# Patient Record
Sex: MALE | Race: WHITE | URBAN - METROPOLITAN AREA
[De-identification: names, ages, dates, MRNs, and addresses within clinical notes are randomized per-mention and may not be internally consistent; named-entity substitution may affect disease eponyms.]

---

## 2022-08-26 ENCOUNTER — TELEPHONE (OUTPATIENT)
Dept: VASCULAR SURGERY | Facility: CLINIC | Age: 67
End: 2022-08-26

## 2022-08-26 NOTE — TELEPHONE ENCOUNTER
Called Dr Radha Colón office and spoke with the Medical [address] [place] [address] [phone], and asked that she fax over all of the patients Vascular records since we only received a BERTRAND, PT has a AAA and had surgery performed in the past to fix it  Dr Chip Duran was patients previous VS and has moved to [place] and sent Dr Laura Holley all of his records        Provided Tatianna York with Fax number [phone]

## 2023-04-11 PROBLEM — I70.203 ATHEROSCLEROSIS OF ARTERY OF BOTH LOWER EXTREMITIES (HCC): Status: ACTIVE | Noted: 2023-04-11

## 2023-04-11 PROBLEM — I10 HYPERTENSION: Status: ACTIVE | Noted: 2023-04-11

## 2023-04-11 PROBLEM — R60.0 BILATERAL LEG EDEMA: Status: ACTIVE | Noted: 2023-04-11

## 2023-04-11 PROBLEM — I42.9 CARDIOMYOPATHY (HCC): Status: ACTIVE | Noted: 2023-04-11

## 2023-04-11 PROBLEM — Z95.828 STATUS POST FEMOROFEMORAL BYPASS SURGERY: Status: ACTIVE | Noted: 2023-04-11

## 2023-06-19 ENCOUNTER — HOSPITAL ENCOUNTER (OUTPATIENT)
Dept: NON INVASIVE DIAGNOSTICS | Facility: CLINIC | Age: 68
Discharge: HOME | End: 2023-06-19
Payer: COMMERCIAL

## 2023-06-19 DIAGNOSIS — I70.203 ATHEROSCLEROSIS OF ARTERY OF BOTH LOWER EXTREMITIES (HCC): ICD-10-CM

## 2023-06-19 DIAGNOSIS — Z95.828 STATUS POST FEMOROFEMORAL BYPASS SURGERY: ICD-10-CM

## 2023-06-19 PROCEDURE — 93923 UPR/LXTR ART STDY 3+ LVLS: CPT

## 2023-06-19 PROCEDURE — 93925 LOWER EXTREMITY STUDY: CPT | Performed by: SURGERY

## 2023-06-19 PROCEDURE — 93922 UPR/L XTREMITY ART 2 LEVELS: CPT | Performed by: SURGERY

## 2023-06-19 PROCEDURE — 93925 LOWER EXTREMITY STUDY: CPT

## 2023-06-19 PROCEDURE — 93978 VASCULAR STUDY: CPT | Performed by: SURGERY

## 2023-06-19 PROCEDURE — 93978 VASCULAR STUDY: CPT

## 2023-08-29 ENCOUNTER — OFFICE VISIT (OUTPATIENT)
Dept: VASCULAR SURGERY | Facility: CLINIC | Age: 68
End: 2023-08-29
Payer: COMMERCIAL

## 2023-08-29 VITALS
BODY MASS INDEX: 26.01 KG/M2 | HEART RATE: 74 BPM | HEIGHT: 72 IN | WEIGHT: 192 LBS | DIASTOLIC BLOOD PRESSURE: 84 MMHG | SYSTOLIC BLOOD PRESSURE: 146 MMHG

## 2023-08-29 DIAGNOSIS — I42.9 CARDIOMYOPATHY, UNSPECIFIED TYPE (HCC): ICD-10-CM

## 2023-08-29 DIAGNOSIS — Z86.79 STATUS POST ENDOVASCULAR ANEURYSM REPAIR (EVAR): ICD-10-CM

## 2023-08-29 DIAGNOSIS — Z95.828 STATUS POST FEMOROFEMORAL BYPASS SURGERY: ICD-10-CM

## 2023-08-29 DIAGNOSIS — I70.203 ATHEROSCLEROSIS OF ARTERY OF BOTH LOWER EXTREMITIES (HCC): Primary | ICD-10-CM

## 2023-08-29 DIAGNOSIS — I70.613: ICD-10-CM

## 2023-08-29 DIAGNOSIS — Z98.890 STATUS POST ENDOVASCULAR ANEURYSM REPAIR (EVAR): ICD-10-CM

## 2023-08-29 PROCEDURE — 99213 OFFICE O/P EST LOW 20 MIN: CPT | Performed by: SURGERY

## 2023-08-29 NOTE — PATIENT INSTRUCTIONS
Severe pain in left foot  Pain due to short distance walking  Foot wound that is not healing    These would be danger signs of worsening arterial blockages in left leg requiring additional surgery.

## 2023-08-29 NOTE — ASSESSMENT & PLAN NOTE
former smoker, COPD, dementia, hypertension, cirrhosis, chronic sCHF, iCMP (reportedly 17%), ICD, CAD, AAA, atherosclerosis lower extremities with a complicated vascular history of prior history of ruptured aortic aneurysm about 10 years ago with endovascular aortic aneurysm repair in [place]. He also has a history of femorofemoral crossover bypass from the right to the left. This had occluded when he suffered from [facility] in 2020. Thrombectomy was tried but failed. It has been chronically occluded since the last 3 years. It is managing with his claudication symptoms with daily walks and stopping intermittently. He denies any ischemic rest pain or open wounds in the left foot. Ankle-brachial index on the left side is 0.5 versus 1 on the right. As symptoms are well managed I do not recommend any invasive intervention at this time. Moreover he is a high risk candidate due to ischemic cardiomyopathy.

## 2023-08-29 NOTE — PROGRESS NOTES
Assessment/Plan:    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)  former smoker, COPD, dementia, hypertension, cirrhosis, chronic sCHF, iCMP (reportedly 17%), ICD, CAD, AAA, atherosclerosis lower extremities with a complicated vascular history of prior history of ruptured aortic aneurysm about 10 years ago with endovascular aortic aneurysm repair in [place]. He also has a history of femorofemoral crossover bypass from the right to the left. This had occluded when he suffered [facility]. Thrombectomy was tried but failed. It has been chronically occluded since the last 3 years. It is managing with his claudication symptoms with daily walks and stopping intermittently. He denies any ischemic rest pain or open wounds in the left foot. Ankle-brachial index on the left side is 0.5 versus 1 on the right. As symptoms are well managed I do not recommend any invasive intervention at this time. Moreover he is a high risk candidate due to ischemic cardiomyopathy. Status post endovascular aneurysm repair (EVAR)  Last EVAR duplex was reviewed, there is no evidence of endoleak in the EVAR stent and the aortic size is not expanding. Thus we will continue to check once a year with aortic Doppler. Diagnoses and all orders for this visit:    Atherosclerosis of artery of both lower extremities ([address])  -     VAS evar endovascular aortic repair duplex; Future  -     VAS lower limb arterial duplex, complete bilateral; Future    Cardiomyopathy, unspecified type (HCC)  -     VAS evar endovascular aortic repair duplex; Future  -     VAS lower limb arterial duplex, complete bilateral; Future    Status post endovascular aneurysm repair (EVAR)  -     VAS evar endovascular aortic repair duplex;  Future  -     VAS lower limb arterial duplex, complete bilateral; Future    Status post femorofemoral bypass surgery    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 [facility])          Subjective:      Patient ID: Jo-Ann Christianson is a 76 y.o. male. Patient presents to review EVAR/ BERTRAND done 6/19. HPI  History obtained from patient's sister who is the caregiver. Some history also obtained from the patient. Pain in the calf left worse than right after walking a few 100 feet. He is able to carry on his day-to-day activity without issues. He will have to rest for couple of minutes after the calf pain starts and then it subsides and he can continue with his walk. Denies any rest pain or open wounds. He is managed on Eliquis, aspirin and Entresto. Patient also takes atorvastatin. The following portions of the patient's history were reviewed and updated as appropriate: allergies, current medications, past family history, past medical history, past social history, past surgical history and problem list.    Review of Systems   Constitutional: Negative. HENT: Negative. Eyes: Negative. Respiratory: Negative. Cardiovascular: Negative. Gastrointestinal: Negative. Endocrine: Negative. Genitourinary: Negative. Musculoskeletal: Negative. Skin: Negative. Allergic/Immunologic: Negative. Neurological: Negative. Hematological: Negative. Psychiatric/Behavioral: Negative. I have reviewed the review of systems as entered and made appropriate changes as necessary    Objective:      /84 (BP Location: Left arm, Patient Position: Sitting, Cuff Size: Standard)   Pulse 74   Ht 6' (1.829 m)   Wt 87.1 kg (192 lb)   BMI 26.04 kg/m²          Physical Exam  Vitals and nursing note reviewed. Constitutional:       Appearance: Normal appearance. Cardiovascular:      Rate and Rhythm: Normal rate and regular rhythm. Comments: Right DP and PT are triphasic. Left side there are biphasic  Abdominal:      General: There is no distension. Palpations: Abdomen is soft. There is no mass. Musculoskeletal:      Right lower leg: No edema.       Left lower leg: No edema. Skin:     General: Skin is warm and dry. Neurological:      Mental Status: He is alert.

## 2023-08-30 PROBLEM — Z86.79 STATUS POST ENDOVASCULAR ANEURYSM REPAIR (EVAR): Status: ACTIVE | Noted: 2023-08-30

## 2023-08-30 PROBLEM — Z98.890 STATUS POST ENDOVASCULAR ANEURYSM REPAIR (EVAR): Status: ACTIVE | Noted: 2023-08-30

## 2023-08-30 PROBLEM — I70.613: Chronic | Status: ACTIVE | Noted: 2023-04-11

## 2023-08-30 NOTE — ASSESSMENT & PLAN NOTE
Last EVAR duplex was reviewed, there is no evidence of endoleak in the EVAR stent and the aortic size is not expanding. Thus we will continue to check once a year with aortic Doppler.

## 2023-11-30 ENCOUNTER — APPOINTMENT (EMERGENCY)
Dept: CT IMAGING | Facility: HOSPITAL | Age: 68
DRG: 871 | End: 2023-11-30
Payer: COMMERCIAL

## 2023-11-30 ENCOUNTER — APPOINTMENT (EMERGENCY)
Dept: RADIOLOGY | Facility: HOSPITAL | Age: 68
DRG: 871 | End: 2023-11-30
Payer: COMMERCIAL

## 2023-11-30 ENCOUNTER — HOSPITAL ENCOUNTER (INPATIENT)
Facility: HOSPITAL | Age: 68
LOS: 4 days | Discharge: HOME HEALTH | DRG: 871 | End: 2023-12-04
Attending: EMERGENCY MEDICINE | Admitting: FAMILY MEDICINE
Payer: COMMERCIAL

## 2023-11-30 DIAGNOSIS — R65.20 SEVERE SEPSIS WITH ACUTE ORGAN DYSFUNCTION (HCC): ICD-10-CM

## 2023-11-30 DIAGNOSIS — R79.89 ELEVATED LACTIC ACID LEVEL: ICD-10-CM

## 2023-11-30 DIAGNOSIS — N17.9 ACUTE KIDNEY INJURY (HCC): ICD-10-CM

## 2023-11-30 DIAGNOSIS — R41.0 ACUTE CONFUSION: ICD-10-CM

## 2023-11-30 DIAGNOSIS — A41.9 SEVERE SEPSIS WITH ACUTE ORGAN DYSFUNCTION (HCC): ICD-10-CM

## 2023-11-30 DIAGNOSIS — I95.9 HYPOTENSION: Primary | ICD-10-CM

## 2023-11-30 DIAGNOSIS — J18.9 PNEUMONIA OF BOTH UPPER LOBES DUE TO INFECTIOUS ORGANISM: ICD-10-CM

## 2023-11-30 DIAGNOSIS — J21.0 RSV (ACUTE BRONCHIOLITIS DUE TO RESPIRATORY SYNCYTIAL VIRUS): ICD-10-CM

## 2023-11-30 PROBLEM — I50.22 CHRONIC SYSTOLIC HEART FAILURE (HCC): Chronic | Status: ACTIVE | Noted: 2023-11-30

## 2023-11-30 PROBLEM — B33.8 RSV INFECTION: Status: ACTIVE | Noted: 2023-11-30

## 2023-11-30 LAB
ALBUMIN SERPL BCP-MCNC: 4 G/DL (ref 3.5–5)
ALP SERPL-CCNC: 87 U/L (ref 34–104)
ALT SERPL W P-5'-P-CCNC: 12 U/L (ref 7–52)
AMMONIA PLAS-SCNC: 23 UMOL/L (ref 18–72)
ANION GAP SERPL CALCULATED.3IONS-SCNC: 13 MMOL/L
APTT PPP: 51 SECONDS (ref 23–37)
AST SERPL W P-5'-P-CCNC: 22 U/L (ref 13–39)
BACTERIA UR QL AUTO: ABNORMAL /HPF
BASOPHILS # BLD AUTO: 0.04 THOUSANDS/ÂΜL (ref 0–0.1)
BASOPHILS NFR BLD AUTO: 0 % (ref 0–1)
BILIRUB DIRECT SERPL-MCNC: 0.09 MG/DL (ref 0–0.2)
BILIRUB SERPL-MCNC: 0.68 MG/DL (ref 0.2–1)
BILIRUB UR QL STRIP: NEGATIVE
BUN SERPL-MCNC: 56 MG/DL (ref 5–25)
CALCIUM SERPL-MCNC: 9.1 MG/DL (ref 8.4–10.2)
CHLORIDE SERPL-SCNC: 104 MMOL/L (ref 96–108)
CLARITY UR: CLEAR
CO2 SERPL-SCNC: 19 MMOL/L (ref 21–32)
COLOR UR: YELLOW
CREAT SERPL-MCNC: 4.18 MG/DL (ref 0.6–1.3)
EOSINOPHIL # BLD AUTO: 0.09 THOUSAND/ÂΜL (ref 0–0.61)
EOSINOPHIL NFR BLD AUTO: 0 % (ref 0–6)
ERYTHROCYTE [DISTWIDTH] IN BLOOD BY AUTOMATED COUNT: 18.6 % (ref 11.6–15.1)
ETHANOL SERPL-MCNC: <10 MG/DL
FLUAV RNA RESP QL NAA+PROBE: NEGATIVE
FLUBV RNA RESP QL NAA+PROBE: NEGATIVE
GFR SERPL CREATININE-BSD FRML MDRD: 13 ML/MIN/1.73SQ M
GLUCOSE SERPL-MCNC: 108 MG/DL (ref 65–140)
GLUCOSE UR STRIP-MCNC: NEGATIVE MG/DL
HCT VFR BLD AUTO: 36.2 % (ref 36.5–49.3)
HGB BLD-MCNC: 11.1 G/DL (ref 12–17)
HGB UR QL STRIP.AUTO: ABNORMAL
HYALINE CASTS #/AREA URNS LPF: ABNORMAL /LPF
IMM GRANULOCYTES # BLD AUTO: 0.24 THOUSAND/UL (ref 0–0.2)
IMM GRANULOCYTES NFR BLD AUTO: 1 % (ref 0–2)
INR PPP: 2.09 (ref 0.84–1.19)
KETONES UR STRIP-MCNC: NEGATIVE MG/DL
LACTATE SERPL-SCNC: 1.5 MMOL/L (ref 0.5–2)
LACTATE SERPL-SCNC: 2.8 MMOL/L (ref 0.5–2)
LEUKOCYTE ESTERASE UR QL STRIP: NEGATIVE
LYMPHOCYTES # BLD AUTO: 1.09 THOUSANDS/ÂΜL (ref 0.6–4.47)
LYMPHOCYTES NFR BLD AUTO: 5 % (ref 14–44)
MCH RBC QN AUTO: 25.2 PG (ref 26.8–34.3)
MCHC RBC AUTO-ENTMCNC: 30.7 G/DL (ref 31.4–37.4)
MCV RBC AUTO: 82 FL (ref 82–98)
MONOCYTES # BLD AUTO: 1.71 THOUSAND/ÂΜL (ref 0.17–1.22)
MONOCYTES NFR BLD AUTO: 8 % (ref 4–12)
MUCOUS THREADS UR QL AUTO: ABNORMAL
NEUTROPHILS # BLD AUTO: 18.68 THOUSANDS/ÂΜL (ref 1.85–7.62)
NEUTS SEG NFR BLD AUTO: 86 % (ref 43–75)
NITRITE UR QL STRIP: NEGATIVE
NON-SQ EPI CELLS URNS QL MICRO: ABNORMAL /HPF
NRBC BLD AUTO-RTO: 0 /100 WBCS
PH UR STRIP.AUTO: 5.5 [PH]
PLATELET # BLD AUTO: 130 THOUSANDS/UL (ref 149–390)
PMV BLD AUTO: 12.2 FL (ref 8.9–12.7)
POTASSIUM SERPL-SCNC: 5 MMOL/L (ref 3.5–5.3)
PROCALCITONIN SERPL-MCNC: 27.48 NG/ML
PROT SERPL-MCNC: 7.2 G/DL (ref 6.4–8.4)
PROT UR STRIP-MCNC: ABNORMAL MG/DL
PROTHROMBIN TIME: 24.3 SECONDS (ref 11.6–14.5)
RBC # BLD AUTO: 4.41 MILLION/UL (ref 3.88–5.62)
RBC #/AREA URNS AUTO: ABNORMAL /HPF
RSV RNA RESP QL NAA+PROBE: POSITIVE
SARS-COV-2 RNA RESP QL NAA+PROBE: NEGATIVE
SODIUM SERPL-SCNC: 136 MMOL/L (ref 135–147)
SP GR UR STRIP.AUTO: 1.02 (ref 1–1.03)
TSH SERPL DL<=0.05 MIU/L-ACNC: 6.49 UIU/ML (ref 0.45–4.5)
UROBILINOGEN UR QL STRIP.AUTO: 0.2 E.U./DL
WBC # BLD AUTO: 21.85 THOUSAND/UL (ref 4.31–10.16)
WBC #/AREA URNS AUTO: ABNORMAL /HPF

## 2023-11-30 PROCEDURE — 83605 ASSAY OF LACTIC ACID: CPT | Performed by: EMERGENCY MEDICINE

## 2023-11-30 PROCEDURE — 74176 CT ABD & PELVIS W/O CONTRAST: CPT

## 2023-11-30 PROCEDURE — 82077 ASSAY SPEC XCP UR&BREATH IA: CPT | Performed by: EMERGENCY MEDICINE

## 2023-11-30 PROCEDURE — 85730 THROMBOPLASTIN TIME PARTIAL: CPT | Performed by: EMERGENCY MEDICINE

## 2023-11-30 PROCEDURE — 80048 BASIC METABOLIC PNL TOTAL CA: CPT | Performed by: EMERGENCY MEDICINE

## 2023-11-30 PROCEDURE — 84145 PROCALCITONIN (PCT): CPT

## 2023-11-30 PROCEDURE — 82140 ASSAY OF AMMONIA: CPT | Performed by: EMERGENCY MEDICINE

## 2023-11-30 PROCEDURE — 96360 HYDRATION IV INFUSION INIT: CPT

## 2023-11-30 PROCEDURE — 87040 BLOOD CULTURE FOR BACTERIA: CPT | Performed by: EMERGENCY MEDICINE

## 2023-11-30 PROCEDURE — 99285 EMERGENCY DEPT VISIT HI MDM: CPT

## 2023-11-30 PROCEDURE — 93005 ELECTROCARDIOGRAM TRACING: CPT

## 2023-11-30 PROCEDURE — 0241U HB NFCT DS VIR RESP RNA 4 TRGT: CPT | Performed by: EMERGENCY MEDICINE

## 2023-11-30 PROCEDURE — 84443 ASSAY THYROID STIM HORMONE: CPT | Performed by: INTERNAL MEDICINE

## 2023-11-30 PROCEDURE — 80076 HEPATIC FUNCTION PANEL: CPT | Performed by: EMERGENCY MEDICINE

## 2023-11-30 PROCEDURE — 71045 X-RAY EXAM CHEST 1 VIEW: CPT

## 2023-11-30 PROCEDURE — 99223 1ST HOSP IP/OBS HIGH 75: CPT | Performed by: FAMILY MEDICINE

## 2023-11-30 PROCEDURE — 36415 COLL VENOUS BLD VENIPUNCTURE: CPT | Performed by: EMERGENCY MEDICINE

## 2023-11-30 PROCEDURE — 85025 COMPLETE CBC W/AUTO DIFF WBC: CPT | Performed by: EMERGENCY MEDICINE

## 2023-11-30 PROCEDURE — 70450 CT HEAD/BRAIN W/O DYE: CPT

## 2023-11-30 PROCEDURE — 85610 PROTHROMBIN TIME: CPT | Performed by: EMERGENCY MEDICINE

## 2023-11-30 PROCEDURE — 87493 C DIFF AMPLIFIED PROBE: CPT | Performed by: INTERNAL MEDICINE

## 2023-11-30 PROCEDURE — 81001 URINALYSIS AUTO W/SCOPE: CPT | Performed by: EMERGENCY MEDICINE

## 2023-11-30 PROCEDURE — 71250 CT THORAX DX C-: CPT

## 2023-11-30 PROCEDURE — 99285 EMERGENCY DEPT VISIT HI MDM: CPT | Performed by: EMERGENCY MEDICINE

## 2023-11-30 RX ORDER — ATORVASTATIN CALCIUM 40 MG/1
80 TABLET, FILM COATED ORAL
Status: DISCONTINUED | OUTPATIENT
Start: 2023-12-01 | End: 2023-12-04 | Stop reason: HOSPADM

## 2023-11-30 RX ORDER — CEFEPIME HYDROCHLORIDE 1 G/50ML
1000 INJECTION, SOLUTION INTRAVENOUS EVERY 12 HOURS
Status: DISCONTINUED | OUTPATIENT
Start: 2023-11-30 | End: 2023-12-02

## 2023-11-30 RX ORDER — TEMAZEPAM 15 MG/1
30 CAPSULE ORAL
Status: DISCONTINUED | OUTPATIENT
Start: 2023-11-30 | End: 2023-12-04 | Stop reason: HOSPADM

## 2023-11-30 RX ORDER — SODIUM CHLORIDE, SODIUM GLUCONATE, SODIUM ACETATE, POTASSIUM CHLORIDE, MAGNESIUM CHLORIDE, SODIUM PHOSPHATE, DIBASIC, AND POTASSIUM PHOSPHATE .53; .5; .37; .037; .03; .012; .00082 G/100ML; G/100ML; G/100ML; G/100ML; G/100ML; G/100ML; G/100ML
75 INJECTION, SOLUTION INTRAVENOUS CONTINUOUS
Status: DISCONTINUED | OUTPATIENT
Start: 2023-11-30 | End: 2023-11-30

## 2023-11-30 RX ORDER — CARVEDILOL 12.5 MG/1
25 TABLET ORAL 2 TIMES DAILY WITH MEALS
Status: DISCONTINUED | OUTPATIENT
Start: 2023-12-01 | End: 2023-12-04 | Stop reason: HOSPADM

## 2023-11-30 RX ORDER — ONDANSETRON 2 MG/ML
4 INJECTION INTRAMUSCULAR; INTRAVENOUS EVERY 6 HOURS PRN
Status: DISCONTINUED | OUTPATIENT
Start: 2023-11-30 | End: 2023-12-04 | Stop reason: HOSPADM

## 2023-11-30 RX ORDER — MIRTAZAPINE 15 MG/1
30 TABLET, FILM COATED ORAL
Status: DISCONTINUED | OUTPATIENT
Start: 2023-11-30 | End: 2023-12-04 | Stop reason: HOSPADM

## 2023-11-30 RX ORDER — CEFTRIAXONE 2 G/50ML
2000 INJECTION, SOLUTION INTRAVENOUS EVERY 24 HOURS
Status: DISCONTINUED | OUTPATIENT
Start: 2023-12-01 | End: 2023-11-30

## 2023-11-30 RX ORDER — ATORVASTATIN CALCIUM 80 MG/1
TABLET, FILM COATED ORAL
Status: CANCELLED | OUTPATIENT
Start: 2023-11-30

## 2023-11-30 RX ORDER — ACETAMINOPHEN 325 MG/1
650 TABLET ORAL EVERY 6 HOURS PRN
Status: DISCONTINUED | OUTPATIENT
Start: 2023-11-30 | End: 2023-12-04 | Stop reason: HOSPADM

## 2023-11-30 RX ORDER — DOCUSATE SODIUM 100 MG/1
100 CAPSULE, LIQUID FILLED ORAL 2 TIMES DAILY
Status: DISCONTINUED | OUTPATIENT
Start: 2023-11-30 | End: 2023-12-04 | Stop reason: HOSPADM

## 2023-11-30 RX ADMIN — CEFEPIME HYDROCHLORIDE 1000 MG: 1 INJECTION, SOLUTION INTRAVENOUS at 22:04

## 2023-11-30 RX ADMIN — MIRTAZAPINE 30 MG: 15 TABLET, FILM COATED ORAL at 21:56

## 2023-11-30 RX ADMIN — TEMAZEPAM 30 MG: 15 CAPSULE ORAL at 23:08

## 2023-11-30 RX ADMIN — SODIUM CHLORIDE 1000 ML: 0.9 INJECTION, SOLUTION INTRAVENOUS at 19:50

## 2023-11-30 RX ADMIN — VANCOMYCIN HYDROCHLORIDE 2000 MG: 1 INJECTION, POWDER, LYOPHILIZED, FOR SOLUTION INTRAVENOUS at 23:06

## 2023-11-30 RX ADMIN — APIXABAN 5 MG: 5 TABLET, FILM COATED ORAL at 20:29

## 2023-11-30 RX ADMIN — SODIUM CHLORIDE 1000 ML: 0.9 INJECTION, SOLUTION INTRAVENOUS at 17:56

## 2023-11-30 RX ADMIN — SODIUM CHLORIDE 1000 ML: 0.9 INJECTION, SOLUTION INTRAVENOUS at 18:29

## 2023-11-30 RX ADMIN — PIPERACILLIN AND TAZOBACTAM 3.38 G: 36; 4.5 INJECTION, POWDER, FOR SOLUTION INTRAVENOUS at 19:32

## 2023-12-01 PROBLEM — I73.9 PAD (PERIPHERAL ARTERY DISEASE) (HCC): Status: ACTIVE | Noted: 2023-12-01

## 2023-12-01 PROBLEM — G93.41 METABOLIC ENCEPHALOPATHY: Status: ACTIVE | Noted: 2023-12-01

## 2023-12-01 LAB
ANION GAP SERPL CALCULATED.3IONS-SCNC: 9 MMOL/L
ATRIAL RATE: 78 BPM
BASOPHILS # BLD AUTO: 0.03 THOUSANDS/ÂΜL (ref 0–0.1)
BASOPHILS NFR BLD AUTO: 0 % (ref 0–1)
BUN SERPL-MCNC: 42 MG/DL (ref 5–25)
C DIFF TOX GENS STL QL NAA+PROBE: NEGATIVE
CALCIUM SERPL-MCNC: 8.1 MG/DL (ref 8.4–10.2)
CHLORIDE SERPL-SCNC: 109 MMOL/L (ref 96–108)
CO2 SERPL-SCNC: 19 MMOL/L (ref 21–32)
CREAT SERPL-MCNC: 2.61 MG/DL (ref 0.6–1.3)
EOSINOPHIL # BLD AUTO: 0 THOUSAND/ÂΜL (ref 0–0.61)
EOSINOPHIL NFR BLD AUTO: 0 % (ref 0–6)
ERYTHROCYTE [DISTWIDTH] IN BLOOD BY AUTOMATED COUNT: 18.5 % (ref 11.6–15.1)
GFR SERPL CREATININE-BSD FRML MDRD: 24 ML/MIN/1.73SQ M
GLUCOSE SERPL-MCNC: 83 MG/DL (ref 65–140)
HCT VFR BLD AUTO: 35 % (ref 36.5–49.3)
HGB BLD-MCNC: 10.7 G/DL (ref 12–17)
IMM GRANULOCYTES # BLD AUTO: 0.1 THOUSAND/UL (ref 0–0.2)
IMM GRANULOCYTES NFR BLD AUTO: 1 % (ref 0–2)
LYMPHOCYTES # BLD AUTO: 0.79 THOUSANDS/ÂΜL (ref 0.6–4.47)
LYMPHOCYTES NFR BLD AUTO: 6 % (ref 14–44)
MAGNESIUM SERPL-MCNC: 1.5 MG/DL (ref 1.9–2.7)
MCH RBC QN AUTO: 25.1 PG (ref 26.8–34.3)
MCHC RBC AUTO-ENTMCNC: 30.6 G/DL (ref 31.4–37.4)
MCV RBC AUTO: 82 FL (ref 82–98)
MONOCYTES # BLD AUTO: 1.05 THOUSAND/ÂΜL (ref 0.17–1.22)
MONOCYTES NFR BLD AUTO: 8 % (ref 4–12)
NEUTROPHILS # BLD AUTO: 11.71 THOUSANDS/ÂΜL (ref 1.85–7.62)
NEUTS SEG NFR BLD AUTO: 85 % (ref 43–75)
NRBC BLD AUTO-RTO: 0 /100 WBCS
P AXIS: 48 DEGREES
PHOSPHATE SERPL-MCNC: 2.8 MG/DL (ref 2.3–4.1)
PLATELET # BLD AUTO: 112 THOUSANDS/UL (ref 149–390)
PMV BLD AUTO: 12.5 FL (ref 8.9–12.7)
POTASSIUM SERPL-SCNC: 4.4 MMOL/L (ref 3.5–5.3)
PR INTERVAL: 192 MS
PROCALCITONIN SERPL-MCNC: 15.75 NG/ML
QRS AXIS: -60 DEGREES
QRSD INTERVAL: 110 MS
QT INTERVAL: 376 MS
QTC INTERVAL: 428 MS
RBC # BLD AUTO: 4.26 MILLION/UL (ref 3.88–5.62)
SODIUM SERPL-SCNC: 137 MMOL/L (ref 135–147)
T WAVE AXIS: 75 DEGREES
VANCOMYCIN SERPL-MCNC: 18.8 UG/ML (ref 10–20)
VENTRICULAR RATE: 78 BPM
WBC # BLD AUTO: 13.68 THOUSAND/UL (ref 4.31–10.16)

## 2023-12-01 PROCEDURE — 99233 SBSQ HOSP IP/OBS HIGH 50: CPT | Performed by: STUDENT IN AN ORGANIZED HEALTH CARE EDUCATION/TRAINING PROGRAM

## 2023-12-01 PROCEDURE — 87081 CULTURE SCREEN ONLY: CPT | Performed by: STUDENT IN AN ORGANIZED HEALTH CARE EDUCATION/TRAINING PROGRAM

## 2023-12-01 PROCEDURE — 80048 BASIC METABOLIC PNL TOTAL CA: CPT

## 2023-12-01 PROCEDURE — 97166 OT EVAL MOD COMPLEX 45 MIN: CPT

## 2023-12-01 PROCEDURE — 83735 ASSAY OF MAGNESIUM: CPT

## 2023-12-01 PROCEDURE — 87205 SMEAR GRAM STAIN: CPT | Performed by: STUDENT IN AN ORGANIZED HEALTH CARE EDUCATION/TRAINING PROGRAM

## 2023-12-01 PROCEDURE — 85025 COMPLETE CBC W/AUTO DIFF WBC: CPT

## 2023-12-01 PROCEDURE — 80202 ASSAY OF VANCOMYCIN: CPT

## 2023-12-01 PROCEDURE — 84145 PROCALCITONIN (PCT): CPT

## 2023-12-01 PROCEDURE — 84100 ASSAY OF PHOSPHORUS: CPT

## 2023-12-01 PROCEDURE — 87070 CULTURE OTHR SPECIMN AEROBIC: CPT | Performed by: STUDENT IN AN ORGANIZED HEALTH CARE EDUCATION/TRAINING PROGRAM

## 2023-12-01 PROCEDURE — 87106 FUNGI IDENTIFICATION YEAST: CPT | Performed by: STUDENT IN AN ORGANIZED HEALTH CARE EDUCATION/TRAINING PROGRAM

## 2023-12-01 PROCEDURE — 97162 PT EVAL MOD COMPLEX 30 MIN: CPT

## 2023-12-01 RX ORDER — VANCOMYCIN HYDROCHLORIDE 1 G/200ML
10 INJECTION, SOLUTION INTRAVENOUS DAILY PRN
Status: DISCONTINUED | OUTPATIENT
Start: 2023-12-01 | End: 2023-12-03

## 2023-12-01 RX ORDER — MAGNESIUM SULFATE 1 G/100ML
1 INJECTION INTRAVENOUS ONCE
Status: COMPLETED | OUTPATIENT
Start: 2023-12-01 | End: 2023-12-02

## 2023-12-01 RX ORDER — ALBUTEROL SULFATE 90 UG/1
2 AEROSOL, METERED RESPIRATORY (INHALATION) EVERY 4 HOURS PRN
Status: DISCONTINUED | OUTPATIENT
Start: 2023-12-01 | End: 2023-12-01

## 2023-12-01 RX ORDER — MAGNESIUM SULFATE HEPTAHYDRATE 40 MG/ML
2 INJECTION, SOLUTION INTRAVENOUS ONCE
Status: COMPLETED | OUTPATIENT
Start: 2023-12-01 | End: 2023-12-02

## 2023-12-01 RX ADMIN — ATORVASTATIN CALCIUM 80 MG: 40 TABLET, FILM COATED ORAL at 17:26

## 2023-12-01 RX ADMIN — MAGNESIUM SULFATE HEPTAHYDRATE 2 G: 40 INJECTION, SOLUTION INTRAVENOUS at 10:02

## 2023-12-01 RX ADMIN — MAGNESIUM SULFATE HEPTAHYDRATE 1 G: 1 INJECTION, SOLUTION INTRAVENOUS at 18:14

## 2023-12-01 RX ADMIN — ASPIRIN 81 MG: 81 TABLET, COATED ORAL at 08:13

## 2023-12-01 RX ADMIN — APIXABAN 5 MG: 5 TABLET, FILM COATED ORAL at 22:22

## 2023-12-01 RX ADMIN — MIRTAZAPINE 30 MG: 15 TABLET, FILM COATED ORAL at 22:22

## 2023-12-01 RX ADMIN — TEMAZEPAM 30 MG: 15 CAPSULE ORAL at 22:35

## 2023-12-01 RX ADMIN — CEFEPIME HYDROCHLORIDE 1000 MG: 1 INJECTION, SOLUTION INTRAVENOUS at 22:28

## 2023-12-01 RX ADMIN — DOCUSATE SODIUM 100 MG: 100 CAPSULE, LIQUID FILLED ORAL at 08:13

## 2023-12-01 RX ADMIN — CEFEPIME HYDROCHLORIDE 1000 MG: 1 INJECTION, SOLUTION INTRAVENOUS at 08:17

## 2023-12-01 NOTE — ASSESSMENT & PLAN NOTE
Wt Readings from Last 3 Encounters:   11/30/23 86.8 kg (191 lb 5.8 oz)   08/29/23 87.1 kg (192 lb)   04/11/23 90.7 kg (200 lb)   Per sister report, patient has been living with heart failure for many years  Continue with daily Entresto  Lasix on hold  due to hypotension, will re-evaluate in the morning to re-start medication  Monitor daily weights and strict I&O's

## 2023-12-01 NOTE — RESPIRATORY THERAPY NOTE
RT Protocol Note  Devan Canchola 76 y.o. male MRN: [id number]  Unit/Bed#: -01 Encounter: [id number]    Assessment    Principal Problem:    Severe sepsis with acute organ dysfunction ([address])  Active Problems:    Cardiomyopathy ([address])    Acute kidney injury ([address])    RSV infection    Chronic systolic heart failure (HCC)    Pneumonia of both upper lobes due to infectious organism      Home Pulmonary Medications:         Past Medical History:   Diagnosis Date    Dementia ([address])     DVT (deep venous thrombosis) ([address])     lt leg     Social History     Socioeconomic History    Marital status:      Spouse name: None    Number of children: None    Years of education: None    Highest education level: None   Occupational History    None   Tobacco Use    Smoking status: Former     Types: Cigarettes    Smokeless tobacco: Never   Vaping Use    Vaping Use: Never used   Substance and Sexual Activity    Alcohol use: None    Drug use: Never    Sexual activity: None   Other Topics Concern    None   Social History Narrative    None     Social Determinants of Health     Financial Resource Strain: Not on file   Food Insecurity: Not on file   Transportation Needs: Not on file   Physical Activity: Not on file   Stress: Not on file   Social Connections: Not on file   Intimate Partner Violence: Not on file   Housing Stability: Not on file       Subjective         Objective    Physical Exam:   Respiratory Pattern: Normal  Chest Assessment: Trachea midline  Bilateral Breath Sounds: Diminished    Vitals:  Blood pressure 100/53, pulse 81, temperature 98.1 °F (36.7 °C), resp. rate 18, height 6' (1.829 m), weight 90 kg (198 lb 6.6 oz), SpO2 95 %.                   Plan    Respiratory Plan: No distress/Pulmonary history        Resp Comments: pt has no pulmonary PMH, no home respiratory meds

## 2023-12-01 NOTE — ASSESSMENT & PLAN NOTE
Wt Readings from Last 3 Encounters:   11/30/23 90 kg (198 lb 6.6 oz)   08/29/23 87.1 kg (192 lb)   04/11/23 90.7 kg (200 lb)   Per sister report, patient has been living with heart failure for many years  Previous echo form care everywhere note with EF 15-20%, s/p ICD in place  Hold  Entresto for ANNETTA  HOLD lasix for ANNETTA  Fluid restriction on 1500 cc daily PO  Monitor daily weights and strict I&O's

## 2023-12-01 NOTE — PLAN OF CARE
Problem: PHYSICAL THERAPY ADULT  Goal: Performs mobility at highest level of function for planned discharge setting. See evaluation for individualized goals. Description: Treatment/Interventions: Functional transfer training, LE strengthening/ROM, Therapeutic exercise, Endurance training, Cognitive reorientation, Patient/family training, Bed mobility, Gait training, Spoke to nursing, OT, Family          See flowsheet documentation for full assessment, interventions and recommendations. Note: Prognosis: Good  Problem List: Decreased strength, Decreased endurance, Impaired balance, Decreased mobility, Decreased cognition  Assessment: Pt is 76year old male seen for PT evaluation s/p admit to [id number] [place] [address] on 11/30/2023 with Severe sepsis with acute organ dysfunction ([address]). PT consulted to assess pt's functional mobility and discharge needs. Order placed for PT evaluation and treatment, with up and out of bed as tolerated order. Comorbidities affecting pt's physical performance at time of assessment include cardiomyopathy, acute kidney injury, RSV infection, chronic systolic heart failure, and pneumonia of both upper lobes due to infectious organism. Prior to hospitalization, pt was independent with all functional mobility without an AD. Pt ambulates unrestricted distances on all terrain and elevations. Pt resides alone in a one level apartment, with no steps to enter. Personal factors affecting pt at time of initial evaluation include inability to ambulate community distances, difficulty navigating level surfaces without external assistance, difficulty performing dynamic tasks in the community, limited home support, and difficulty performing ADLs and IADLs.  Please find objective findings from PT assessment regarding body systems outlined above with impairments and limitations including weakness, impaired balance, decreased endurance, gait deviations, decreased activity tolerance, decreased functional mobility tolerance, fall risk, and decreased cognition. The following objective measures were performed on initial evaluation Barthel Index: 55/100, Modified Cata[place]o: 3 (moderate disability), and AM-PAC 6-Clicks: 73/09. Pt's clinical presentation is currently evolving seen in pt's presentation of need for ongoing medical management/monitoring, pt is a fall risk, and pt requires cues and assist for safety with functional mobility. Pt to benefit from continued PT treatment to address deficits as defined above and maximize pt's level of function and independence with mobility. From a PT standpoint, recommendation at time of discharge would be level 3, minimal resource intensity, with increased family support, in order to facilitate return to prior level of function. Barriers to Discharge: Decreased caregiver support     Rehab Resource Intensity Level, PT: III (Minimum Resource Intensity) (and family support)    See flowsheet documentation for full assessment.

## 2023-12-01 NOTE — RESPIRATORY THERAPY NOTE
RT Protocol Note  Sylvie Pereyra 76 y.o. male MRN: [id number]  Unit/Bed#: -01 Encounter: [id number]    Assessment    Principal Problem:    Severe sepsis with acute organ dysfunction ([address])  Active Problems:    Cardiomyopathy ([address])    Acute kidney injury ([address])    RSV infection    Chronic systolic heart failure (HCC)    Pneumonia of both upper lobes due to infectious organism      Home Pulmonary Medications:  none       Past Medical History:   Diagnosis Date    Dementia ([address])     DVT (deep venous thrombosis) ([address])     lt leg     Social History     Socioeconomic History    Marital status:      Spouse name: None    Number of children: None    Years of education: None    Highest education level: None   Occupational History    None   Tobacco Use    Smoking status: Former     Types: Cigarettes    Smokeless tobacco: Never   Vaping Use    Vaping Use: Never used   Substance and Sexual Activity    Alcohol use: None    Drug use: Never    Sexual activity: None   Other Topics Concern    None   Social History Narrative    None     Social Determinants of Health     Financial Resource Strain: Not on file   Food Insecurity: Not on file   Transportation Needs: Not on file   Physical Activity: Not on file   Stress: Not on file   Social Connections: Not on file   Intimate Partner Violence: Not on file   Housing Stability: Not on file       Subjective         Objective    Physical Exam:   Assessment Type: Assess only  General Appearance: Awake, Alert  Respiratory Pattern: Normal  Chest Assessment: Chest expansion symmetrical  Bilateral Breath Sounds: Diminished  Cough: None  O2 Device: (P) RA    Vitals:  Blood pressure 101/59, pulse 81, temperature 98.3 °F (36.8 °C), resp. rate (P) 18, height 6' (1.829 m), weight 84.3 kg (185 lb 13.6 oz), SpO2 (P) 92 %.           Imaging and other studies:     O2 Device: (P) RA     Plan    Respiratory Plan: (P) Discontinue Protocol        Resp Comments: (P) Pt sitting in chair in no apparent distress. Pt admitted w/ RSV. Pt has no pulmonary hx. Will d/c inhaler and protocol.

## 2023-12-01 NOTE — UTILIZATION REVIEW
Initial Clinical Review    Admission: Date/Time/Statement:   Admission Orders (From admission, onward)       Ordered        11/30/23 1924  INPATIENT ADMISSION  Once                          Orders Placed This Encounter   Procedures    INPATIENT ADMISSION     Standing Status:   Standing     Number of Occurrences:   1     Order Specific Question:   Level of Care     Answer:   Med Surg [16]     Order Specific Question:   Estimated length of stay     Answer:   More than 2 Midnights     Order Specific Question:   Certification     Answer:   I certify that inpatient services are medically necessary for this patient for a duration of greater than two midnights. See H&P and MD Progress Notes for additional information about the patient's course of treatment. ED Arrival Information       Expected   -    Arrival   11/30/2023 17:17    Acuity   Emergent              Means of arrival   Ambulance    Escorted by   [address] [facility]   Hospitalist    Admission type   Emergency              Arrival complaint   weakness/poss stroke             Chief Complaint   Patient presents with    Altered Mental Status    CVA/TIA-like Symptoms     Per ems sister called with c/o this am pt woke this 730 am with confusion and lt side weakness. Gait disturbance last night. Pt currently alert oriented to person and place confused to time. Initial Presentation: 76 y.o. male [place] ED via  [place]  with altered mental status. Sister states patient  acting differently  on waking the morning of admission. Staff reported some gait disturbance and confusion. Had multiple episodes of  bowel  and bladder incontinence. VN felt  patient  having  stroke like symptoms. Ct head  unremarkable. Labs reveal  WBC  21.28, creatinine   4.18, lactic acid  2.8  and positive  RSV.  Sister feels  he is weaker than usual.  PMH  is  triple  AAA  with repair, dementia, HTN and CHF, resides in  [place] with [place]  f/u.  CT chest and abdomen shows  multi lobar pneumonia/pulmonary emphysema. Hypotensive in ED,  S/P 2  boluses  NS,  initially responded,  again became hypotensive  and 2  additional boluses  given. Met sepsis criteria in ED. Admit  Ip with  Severe sepsis with acute organ dysfunction, Pneumonia, ANNETTA and  RSV infection and plan is  CHANTELLE,  monitor labs, sputum culture, O2  as needed, fluid restrict,  strict  I & O, daily weight  and re assess home  meds. Date:  12/1      Day 2:   Remains on  CHANTELLE. Continue fluid restrict, I & O. Sats  adequate  RA. Monitor labs.       ED Triage Vitals   Temperature Pulse Respirations Blood Pressure SpO2   11/30/23 1722 11/30/23 1722 11/30/23 1722 11/30/23 1722 11/30/23 1756   97.8 °F (36.6 °C) 79 22 (!) 82/48 94 %      Temp Source Heart Rate Source Patient Position - Orthostatic VS BP Location FiO2 (%)   11/30/23 1722 11/30/23 1722 11/30/23 1722 11/30/23 1722 --   Oral Monitor Lying Right arm       Pain Score       11/30/23 1722       No Pain          Wt Readings from Last 1 Encounters:   12/01/23 84.3 kg (185 lb 13.6 oz)     Additional Vital Signs:   8.3 °F (36.8 °C) -- -- 101/59 73 -- -- --    12/01/23 0445 -- 91 -- 100/54 69 93 % -- --   12/01/23 0247 98.8 °F (37.1 °C) 79 -- 111/62 78 91 % -- --   12/01/23 01:00:23 98.1 °F (36.7 °C) 81 -- 100/53 69 95 % -- --   11/30/23 2333 -- 73 -- 112/54 73 94 % -- --   11/30/23 22:49:05 98.3 °F (36.8 °C) 85 -- 117/54 75 100 % -- --   11/30/23 21:55:23 -- 79 -- 92/49 Abnormal  63 Abnormal  96 % -- --   11/30/23 20:32:18 98.2 °F (36.8 °C) 77 -- 96/56 69 95 % -- --   11/30/23 20:10:25 97.7 °F (36.5 °C) 79 -- 98/60 73 94 % -- --   11/30/23 20:08:43 97.7 °F (36.5 °C) 43 Abnormal  -- 98/60 73 91 % -- --   11/30/23 20:08:33 97.7 °F (36.5 °C) -- 18 98/60 73 98 % None (Room air) --   11/30/23 1950 -- -- -- 82/46 Abnormal  -- -- -- Lying   11/30/23 1900 -- 73 20 103/57 76 -- None (Room air) --   11/30/23 1830 -- 70 20 97/54 72 98 % None (Room air) Lying   11/30/23 1800 -- -- -- -- -- -- None (Room air) --   11/30/23 1756 -- 81 20 94/51 67 94 % None (Room air) Lying   11/30/23 1722 97.8 °F (36.6 °C) 79 22 82/48 Abnormal  -- -- None (Room air) Lying     Pertinent Labs/Diagnostic Test Results:   CT chest abdomen pelvis wo contrast   Final Result by Alis Villalta MD (11/30 2010)      Multi lobar pneumonia. Severe upper lobe predominant pulmonary emphysema. No acute findings in the abdomen or pelvis. The study was marked in [facility] for immediate notification. Workstation performed: [id number]         XR chest 1 view portable   Final Result by Aj Orozco MD (12/01 7327)      Bibasilar opacities, compatible with an infectious/inflammatory process, better characterized on the same day CT. Cardiomegaly and emphysema. Workstation performed: [id number]         CT head without contrast   Final Result by Author Bret MD (57/44 4906)      1. No acute intracranial abnormality. Chronic microangiopathic changes. 2.  Pansinus mucosal disease.                Workstation performed: [id number]           Results from last 7 days   Lab Units 11/30/23  1802   SARS-COV-2  Negative     Results from last 7 days   Lab Units 12/01/23 0458 11/30/23  1746   WBC Thousand/uL 13.68* 21.85*   HEMOGLOBIN g/dL 10.7* 11.1*   HEMATOCRIT % 35.0* 36.2*   PLATELETS Thousands/uL 112* 130*   NEUTROS ABS Thousands/µL 11.71* 18.68*         Results from last 7 days   Lab Units 12/01/23  0458 11/30/23  1746   SODIUM mmol/L 137 136   POTASSIUM mmol/L 4.4 5.0   CHLORIDE mmol/L 109* 104   CO2 mmol/L 19* 19*   ANION GAP mmol/L 9 13   BUN mg/dL 42* 56*   CREATININE mg/dL 2.61* 4.18*   EGFR ml/min/1.73sq m 24 13   CALCIUM mg/dL 8.1* 9.1   MAGNESIUM mg/dL 1.5*  --    PHOSPHORUS mg/dL 2.8  --      Results from last 7 days   Lab Units 11/30/23  1746   AST U/L 22   ALT U/L 12   ALK PHOS U/L 87   TOTAL PROTEIN g/dL 7.2   ALBUMIN g/dL 4.0   TOTAL BILIRUBIN mg/dL 0. 68   BILIRUBIN DIRECT mg/dL 0.09   AMMONIA umol/L 23         Results from last 7 days   Lab Units 12/01/23  0458 11/30/23  1746   GLUCOSE RANDOM mg/dL 83 108               Results from last 7 days   Lab Units 11/30/23  1925   PROTIME seconds 24.3*   INR  2.09*   PTT seconds 51*     Results from last 7 days   Lab Units 11/30/23  1746   TSH 3RD GENERATON uIU/mL 6.490*     Results from last 7 days   Lab Units 12/01/23 0458 11/30/23 2058   PROCALCITONIN ng/ml 15.75* 27.48*     Results from last 7 days   Lab Units 11/30/23 2052 11/30/23 1746   LACTIC ACID mmol/L 1.5 2.8*           Results from last 7 days   Lab Units 11/30/23 2123   CLARITY UA  Clear   COLOR UA  Yellow   SPEC GRAV UA  1.020   PH UA  5.5   GLUCOSE UA mg/dl Negative   KETONES UA mg/dl Negative   BLOOD UA  Moderate*   PROTEIN UA mg/dl Trace*   NITRITE UA  Negative   BILIRUBIN UA  Negative   UROBILINOGEN UA E.U./dl 0.2   LEUKOCYTES UA  Negative   WBC UA /hpf 1-2   RBC UA /hpf 1-2   BACTERIA UA /hpf None Seen   EPITHELIAL CELLS WET PREP /hpf None Seen   MUCUS THREADS  Moderate*     Results from last 7 days   Lab Units 11/30/23  1802   INFLUENZA A PCR  Negative   INFLUENZA B PCR  Negative   RSV PCR  Positive*             Results from last 7 days   Lab Units 11/30/23  1746   ETHANOL LVL mg/dL <10                 Results from last 7 days   Lab Units 11/30/23  1930 11/30/23 1925   BLOOD CULTURE  Received in Microbiology Lab. Culture in Progress. Received in Microbiology Lab. Culture in Progress.              Results from last 7 days   Lab Units 12/01/23  0458   VANCOMYCIN RM ug/mL 18.8       ED Treatment:   Medication Administration from 11/30/2023 1717 to 11/30/2023 2003         Date/Time Order Dose Route Action Comments     11/30/2023 1856 EST sodium chloride 0.9 % bolus 1,000 mL 0 mL Intravenous Stopped --     11/30/2023 1756 EST sodium chloride 0.9 % bolus 1,000 mL 1,000 mL Intravenous New Bag --     11/30/2023 1934 EST sodium chloride 0.9 % bolus 1,000 mL 0 mL Intravenous Stopped --     11/30/2023 1829 EST sodium chloride 0.9 % bolus 1,000 mL 1,000 mL Intravenous New Bag --     11/30/2023 1932 EST piperacillin-tazobactam (ZOSYN) 3.375 g in sodium chloride 0.9 % 100 mL IVPB 3.375 g Intravenous New Bag --     11/30/2023 1950 EST sodium chloride 0.9 % bolus 1,000 mL 1,000 mL Intravenous New Bag --            Present on Admission:   Severe sepsis with acute organ dysfunction (HCC)   Acute kidney injury ([address])   RSV infection   Pneumonia of both upper lobes due to infectious organism   Cardiomyopathy [facility])      Admitting Diagnosis: Acute confusion [R41.0]  RSV (acute bronchiolitis due to respiratory syncytial virus) [J21.0]  Hypotension [I95.9]  Elevated lactic acid level [R79.89]  AMS (altered mental status) [R41.82]  Age/Sex: 76 y.o. male  Admission Orders:  Scheduled Medications:  apixaban, 5 mg, Oral, Daily  aspirin, 81 mg, Oral, Daily  atorvastatin, 80 mg, Oral, Daily With Dinner  carvedilol, 25 mg, Oral, BID With Meals  cefepime, 1,000 mg, Intravenous, Q12H  docusate sodium, 100 mg, Oral, BID  magnesium sulfate, 2 g, Intravenous, Once  mirtazapine, 30 mg, Oral, HS      Continuous IV Infusions:     PRN Meds:  acetaminophen, 650 mg, Oral, Q6H PRN  albuterol, 2 puff, Inhalation, Q4H PRN  ondansetron, 4 mg, Intravenous, Q6H PRN  temazepam, 30 mg, Oral, HS PRN  vancomycin, 10 mg/kg, Intravenous, Daily PRN        IP CONSULT TO PHARMACY    Network Utilization Review Department  ATTENTION: Please call with any questions or concerns to [phone] and carefully listen to the prompts so that you are directed to the right person. All voicemails are confidential.   For Discharge needs, contact Care Management DC Support Team at [phone] opt. 2  Send all requests for admission clinical reviews, approved or denied determinations and any other requests to dedicated fax number below belonging to the campus where the patient is receiving treatment.  List of dedicated fax numbers for the Facilities:  [place] DENIALS (Administrative/Medical Necessity) [phone]   DISCHARGE SUPPORT TEAM (NETWORK) [id number] [facility] (Maternity/NICU/Pediatrics) [phone]   [address] [facility] [phone] [address] [place] [facility] [address] [facility] [address] [address] [facility] [address] [phone]

## 2023-12-01 NOTE — ED PROVIDER NOTES
History  Chief Complaint   Patient presents with    Altered Mental Status    CVA/TIA-like Symptoms     Per ems sister called with c/o this am pt woke this 730 am with confusion and lt side weakness. Gait disturbance last night. Pt currently alert oriented to person and place confused to time. 75 yo male brought to ED by EMS for evaluation of confusion since yesterday. Sister provides additional history; she states that the pt did not recognize her today, this has never happened before. He does have previously diagnosed dementia but has always recognized her. She reports he has been coughing for the last 2 days and had a recent close contact with RSV. EMS note SBP in the 80's. Complete history unable to be obtained due to confusion. Prior to Admission Medications   Prescriptions Last Dose Informant Patient Reported? Taking? Eliquis 5 MG  Self Yes No   Entresto 49-51 MG TABS  Self Yes No   Klor-Con M20 20 MEQ tablet  Self Yes No   aspirin (ECOTRIN LOW STRENGTH) 81 mg EC tablet  Self Yes No   Sig: Take 81 mg by mouth daily   atorvastatin (LIPITOR) 80 mg tablet  Self Yes No   carvedilol (COREG) 25 mg tablet  Self Yes No   digoxin (LANOXIN) 0.125 mg tablet  Self Yes No   furosemide (LASIX) 40 mg tablet  Self Yes No   mirtazapine (REMERON) 30 mg tablet  Self Yes No   pantoprazole (PROTONIX) 40 mg tablet  Self Yes No   temazepam (RESTORIL) 30 mg capsule  Self Yes No   Sig: TAKE 1 TABLET AT BEDTIME WHEN NECESSARY FOR SLEEP      Facility-Administered Medications: None       Past Medical History:   Diagnosis Date    Dementia (HCC)     DVT (deep venous thrombosis) (HCC)     lt leg       Past Surgical History:   Procedure Laterality Date    ABDOMINAL AORTIC ANEURYSM REPAIR, OPEN      repaired x2       History reviewed. No pertinent family history. I have reviewed and agree with the history as documented.     E-Cigarette/Vaping    E-Cigarette Use Never User      E-Cigarette/Vaping Substances    Nicotine No     THC No     CBD No     Flavoring No     Other No     Unknown No      Social History     Tobacco Use    Smoking status: Former     Types: Cigarettes    Smokeless tobacco: Never   Vaping Use    Vaping Use: Never used   Substance Use Topics    Drug use: Never       Review of Systems   Unable to perform ROS: Mental status change       Physical Exam  Physical Exam  Vitals and nursing note reviewed. Constitutional:       General: He is not in acute distress. Appearance: He is well-developed. He is not ill-appearing, toxic-appearing or diaphoretic. HENT:      Head: Normocephalic and atraumatic. Mouth/Throat:      Mouth: Mucous membranes are moist.      Pharynx: Oropharynx is clear. Eyes:      Conjunctiva/sclera: Conjunctivae normal.      Pupils: Pupils are equal, round, and reactive to light. Neck:      Vascular: No JVD. Cardiovascular:      Rate and Rhythm: Normal rate and regular rhythm. Pulses: Normal pulses. Heart sounds: Normal heart sounds. No murmur heard. No friction rub. No gallop. Pulmonary:      Effort: Pulmonary effort is normal. No respiratory distress. Breath sounds: No stridor. Rhonchi present. No wheezing or rales. Chest:      Chest wall: No tenderness. Abdominal:      General: There is no distension. Palpations: Abdomen is soft. Tenderness: There is no abdominal tenderness. There is no guarding or rebound. Musculoskeletal:         General: No swelling, tenderness, deformity or signs of injury. Normal range of motion. Cervical back: Normal range of motion and neck supple. No rigidity. Skin:     General: Skin is warm and dry. Capillary Refill: Capillary refill takes less than 2 seconds. Coloration: Skin is not jaundiced or pale. Findings: No bruising or erythema. Neurological:      General: No focal deficit present. Mental Status: He is alert. He is disoriented. Cranial Nerves: No cranial nerve deficit.       Sensory: No sensory deficit. Motor: No weakness or abnormal muscle tone.       Coordination: Coordination normal.      Gait: Gait normal.         Vital Signs  ED Triage Vitals   Temperature Pulse Respirations Blood Pressure SpO2   11/30/23 1722 11/30/23 1722 11/30/23 1722 11/30/23 1722 11/30/23 1756   97.8 °F (36.6 °C) 79 22 (!) 82/48 94 %      Temp Source Heart Rate Source Patient Position - Orthostatic VS BP Location FiO2 (%)   11/30/23 1722 11/30/23 1722 11/30/23 1722 11/30/23 1722 --   Oral Monitor Lying Right arm       Pain Score       11/30/23 1722       No Pain           Vitals:    11/30/23 2008 11/30/23 2008 11/30/23 2010 11/30/23 2032   BP: 98/60 98/60 98/60 96/56   Pulse:  (!) 43 79 77   Patient Position - Orthostatic VS:             Visual Acuity  Visual Acuity      Flowsheet Row Most Recent Value   L Pupil Size (mm) 3   R Pupil Size (mm) 3   L Pupil Shape Round   R Pupil Shape Round            ED Medications  Medications   aspirin (ECOTRIN LOW STRENGTH) EC tablet 81 mg (has no administration in time range)   sodium chloride 0.9 % bolus 1,000 mL (1,000 mL Intravenous New Bag 11/30/23 1950)   carvedilol (COREG) tablet 25 mg (has no administration in time range)   apixaban (ELIQUIS) tablet 5 mg (5 mg Oral Given 11/30/23 2029)   sacubitril-valsartan (ENTRESTO) 49-51 MG per tablet 1 tablet (has no administration in time range)   mirtazapine (REMERON) tablet 30 mg (has no administration in time range)   temazepam (RESTORIL) capsule 30 mg (has no administration in time range)   atorvastatin (LIPITOR) tablet 80 mg (has no administration in time range)   lactated ringers bolus 1,000 mL (has no administration in time range)     Followed by   lactated ringers bolus 1,000 mL (has no administration in time range)     Followed by   lactated ringers bolus 1,000 mL (has no administration in time range)   multi-electrolyte (PLASMALYTE-A/ISOLYTE-S PH 7.4) IV solution (has no administration in time range)   acetaminophen (TYLENOL) tablet 650 mg (has no administration in time range)   docusate sodium (COLACE) capsule 100 mg (100 mg Oral Not Given 11/30/23 2029)   ondansetron (ZOFRAN) injection 4 mg (has no administration in time range)   cefTRIAXone (ROCEPHIN) IVPB (premix in dextrose) 2,000 mg 50 mL (has no administration in time range)   sodium chloride 0.9 % bolus 1,000 mL (0 mL Intravenous Stopped 11/30/23 1856)   sodium chloride 0.9 % bolus 1,000 mL (0 mL Intravenous Stopped 11/30/23 1934)   piperacillin-tazobactam (ZOSYN) 3.375 g in sodium chloride 0.9 % 100 mL IVPB (3.375 g Intravenous New Bag 11/30/23 1932)       Diagnostic Studies  Results Reviewed       Procedure Component Value Units Date/Time    APTT [[id number]]  (Abnormal) Collected: 11/30/23 1925    Lab Status: Final result Specimen: Blood from Arm, Right Updated: 11/30/23 2026     PTT 51 seconds     Protime-INR [[id number]]  (Abnormal) Collected: 11/30/23 1925    Lab Status: Final result Specimen: Blood from Arm, Right Updated: 11/30/23 2026     Protime 24.3 seconds      INR 2.09    TSH, 3rd generation [[id number]]     Lab Status: No result Specimen: Blood     Blood culture #1 [[id number]] Collected: 11/30/23 1930    Lab Status: In process Specimen: Blood from Arm, Left Updated: 11/30/23 1932    Blood culture #2 [[id number]] Collected: 11/30/23 1925    Lab Status: In process Specimen: Blood from Arm, Right Updated: 11/30/23 1932    FLU/RSV/COVID - if FLU/RSV clinically relevant [[id number]]  (Abnormal) Collected: 11/30/23 1802    Lab Status: Final result Specimen: Nares from Nose Updated: 11/30/23 1847     SARS-CoV-2 Negative     INFLUENZA A PCR Negative     INFLUENZA B PCR Negative     RSV PCR Positive    Narrative:      FOR PEDIATRIC PATIENTS - copy/paste COVID Guidelines URL to browser: [email or web address]    SARS-CoV-2 assay is a Nucleic Acid Amplification assay intended for the  qualitative detection of nucleic acid from SARS-CoV-2 in nasopharyngeal  swabs. Results are for the presumptive identification of SARS-CoV-2 RNA. Positive results are indicative of infection with SARS-CoV-2, the virus  causing COVID-19, but do not rule out bacterial infection or co-infection  with other viruses. Laboratories within the [facility] and its  territories are required to report all positive results to the appropriate  public health authorities. Negative results do not preclude SARS-CoV-2  infection and should not be used as the sole basis for treatment or other  patient management decisions. Negative results must be combined with  clinical observations, patient history, and epidemiological information. This test has not been FDA cleared or approved. This test has been authorized by FDA under an Emergency Use Authorization  (EUA). This test is only authorized for the duration of time the  declaration that circumstances exist justifying the authorization of the  emergency use of an in vitro diagnostic tests for detection of SARS-CoV-2  virus and/or diagnosis of COVID-19 infection under section 564(b)(1) of  the Act, 21 U. S.C. 061ZZV-2(D)(6), unless the authorization is terminated  or revoked sooner. The test has been validated but independent review by FDA  and CLIA is pending. Test performed using Related Content Database (RCDb) GeneXpert: This RT-PCR assay targets N2,  a region unique to SARS-CoV-2. A conserved region in the E-gene was chosen  for pan-Sarbecovirus detection which includes SARS-CoV-2. According to CMS-2020-01-R, this platform meets the definition of high-throughput technology. Lactic acid, plasma (w/reflex if result > 2.0) [[id number]]  (Abnormal) Collected: 11/30/23 1746    Lab Status: Final result Specimen: Blood from Arm, Left Updated: 11/30/23 1842     LACTIC ACID 2.8 mmol/L     Narrative:      Result may be elevated if tourniquet was used during collection.     Lactic acid 2 Hours [[id number]]     Lab Status: No result Specimen: Blood     Basic metabolic panel [[id number]]  (Abnormal) Collected: 11/30/23 1746    Lab Status: Final result Specimen: Blood from Arm, Left Updated: 11/30/23 1829     Sodium 136 mmol/L      Potassium 5.0 mmol/L      Chloride 104 mmol/L      CO2 19 mmol/L      ANION GAP 13 mmol/L      BUN 56 mg/dL      Creatinine 4.18 mg/dL      Glucose 108 mg/dL      Calcium 9.1 mg/dL      eGFR 13 ml/min/1.73sq m     Narrative:      [place] guidelines for Chronic Kidney Disease (CKD):     Stage 1 with normal or high GFR (GFR > 90 mL/min/1.73 square meters)    Stage 2 Mild CKD (GFR = 60-89 mL/min/1.73 square meters)    Stage 3A Moderate CKD (GFR = 45-59 mL/min/1.73 square meters)    Stage 3B Moderate CKD (GFR = 30-44 mL/min/1.73 square meters)    Stage 4 Severe CKD (GFR = 15-29 mL/min/1.73 square meters)    Stage 5 End Stage CKD (GFR <15 mL/min/1.73 square meters)  Note: GFR calculation is accurate only with a steady state creatinine    Hepatic function panel [[id number]]  (Normal) Collected: 11/30/23 1746    Lab Status: Final result Specimen: Blood from Arm, Left Updated: 11/30/23 1829     Total Bilirubin 0.68 mg/dL      Bilirubin, Direct 0.09 mg/dL      Alkaline Phosphatase 87 U/L      AST 22 U/L      ALT 12 U/L      Total Protein 7.2 g/dL      Albumin 4.0 g/dL     Ammonia [[id number]]  (Normal) Collected: 11/30/23 1746    Lab Status: Final result Specimen: Blood from Arm, Left Updated: 11/30/23 1828     Ammonia 23 umol/L     Ethanol [[id number]]  (Normal) Collected: 11/30/23 1746    Lab Status: Final result Specimen: Blood from Arm, Left Updated: 11/30/23 1828     Ethanol Lvl <10 mg/dL     CBC and differential [[id number]]  (Abnormal) Collected: 11/30/23 1746    Lab Status: Final result Specimen: Blood from Arm, Left Updated: 11/30/23 1807     WBC 21.85 Thousand/uL      RBC 4.41 Million/uL      Hemoglobin 11.1 g/dL      Hematocrit 36.2 %      MCV 82 fL      MCH 25.2 pg      MCHC 30.7 g/dL      RDW 18.6 % MPV 12.2 fL      Platelets 845 Thousands/uL      nRBC 0 /100 WBCs      Neutrophils Relative 86 %      Immat GRANS % 1 %      Lymphocytes Relative 5 %      Monocytes Relative 8 %      Eosinophils Relative 0 %      Basophils Relative 0 %      Neutrophils Absolute 18.68 Thousands/µL      Immature Grans Absolute 0.24 Thousand/uL      Lymphocytes Absolute 1.09 Thousands/µL      Monocytes Absolute 1.71 Thousand/µL      Eosinophils Absolute 0.09 Thousand/µL      Basophils Absolute 0.04 Thousands/µL     UA w Reflex to Microscopic w Reflex to Culture [[id number]]     Lab Status: No result Specimen: Urine                    CT chest abdomen pelvis wo contrast   Final Result by Sally Hargrove MD (11/30 2010)      Multi lobar pneumonia. Severe upper lobe predominant pulmonary emphysema. No acute findings in the abdomen or pelvis. The study was marked in [facility] for immediate notification. Workstation performed: [id number]         CT head without contrast   Final Result by Nina Higgins MD (94/12 [id number])      1. No acute intracranial abnormality. Chronic microangiopathic changes. 2.  Pansinus mucosal disease. Workstation performed: [id number]         XR chest 1 view portable    (Results Pending)              Procedures  CriticalCare Time    Date/Time: 11/30/2023 7:24 PM    Performed by: Breezy De Jesus MD  Authorized by: Breezy De Jesus MD    Critical care provider statement:     Critical care time (minutes):  35    Critical care time was exclusive of:  Separately billable procedures and treating other patients    Critical care was necessary to treat or prevent imminent or life-threatening deterioration of the following conditions:  Sepsis, shock and renal failure           ED Course                               SBIRT 22yo+      Flowsheet Row Most Recent Value   Initial Alcohol Screen: US AUDIT-C     1.  How often do you have a drink containing alcohol? 0 Filed at: 11/30/2023 1726   2. How many drinks containing alcohol do you have on a typical day you are drinking? 0 Filed at: 11/30/2023 1726   3b. FEMALE Any Age, or MALE 65+: How often do you have 4 or more drinks on one occassion? 0 Filed at: 11/30/2023 1726   Audit-C Score 0 Filed at: 11/30/2023 1726   EDILMA: How many times in the past year have you. .. Used an illegal drug or used a prescription medication for non-medical reasons? Never Filed at: 11/30/2023 1726                      Medical Decision Making  Problems Addressed:  Acute confusion: complicated acute illness or injury  Elevated lactic acid level: complicated acute illness or injury that poses a threat to life or bodily functions  Hypotension: complicated acute illness or injury that poses a threat to life or bodily functions  RSV (acute bronchiolitis due to respiratory syncytial virus): complicated acute illness or injury with systemic symptoms that poses a threat to life or bodily functions    Amount and/or Complexity of Data Reviewed  Labs: ordered. Radiology: ordered. Risk  Decision regarding hospitalization.              Disposition  Final diagnoses:   Hypotension   Elevated lactic acid level   RSV (acute bronchiolitis due to respiratory syncytial virus)   Acute confusion     Time reflects when diagnosis was documented in both MDM as applicable and the Disposition within this note       Time User Action Codes Description Comment    11/30/2023  7:24 PM Rayna Solano Add [I95.9] Hypotension     11/30/2023  7:24 PM Rayna Solano Add [R79.89] Elevated lactic acid level     11/30/2023  7:24 PM Nilam Umanzor [J21.0] RSV (acute bronchiolitis due to respiratory syncytial virus)     11/30/2023  7:24 PM Rayna Solano Add [R41.0] Acute confusion           ED Disposition       ED Disposition   Admit    Condition   Stable    Date/Time   Thu Nov 30, 2023 1923    Comment   Case was discussed with Dr Desean Milelr and the patient's admission status was agreed to be Admission Status: inpatient status to the service of Dr. Desean Miller . Follow-up Information    None         Current Discharge Medication List        CONTINUE these medications which have NOT CHANGED    Details   aspirin (ECOTRIN LOW STRENGTH) 81 mg EC tablet Take 81 mg by mouth daily      atorvastatin (LIPITOR) 80 mg tablet       carvedilol (COREG) 25 mg tablet       digoxin (LANOXIN) 0.125 mg tablet       Eliquis 5 MG       Entresto 49-51 MG TABS       furosemide (LASIX) 40 mg tablet       Klor-Con M20 20 MEQ tablet       mirtazapine (REMERON) 30 mg tablet       pantoprazole (PROTONIX) 40 mg tablet       temazepam (RESTORIL) 30 mg capsule TAKE 1 TABLET AT BEDTIME WHEN NECESSARY FOR SLEEP             No discharge procedures on file.     PDMP Review       None            ED Provider  Electronically Signed by             Farzana Vu MD  11/30/23 2040

## 2023-12-01 NOTE — ASSESSMENT & PLAN NOTE
Patient meets sepsis criteria, WBC 21.28, lactic 2.8, Cr 4.18,  hypotension, afebrile, source of infection is unknown   CT of head shows no acute intracranial abnormality. Chronic microangiopathic changes and pansinus mucosal disease. Chest x-ray is pending   Noted patient is hypotensive in ED, two boluses of NS administrated.  Patient responded to fluids but became hypotensive again, 2 bolus of LR ordered  Maintenance fluid to be administrated after bolus   RSV positive in respiratory panel   One time Zosyn given in ER, start ceftriaxone 1 gr on 12/1/23  Supplemental oxygen ordered if needed  Will continue to monitor trend on WBC with morning labs, Lactic will be re-take in 2 hours  Procalcitonin ordered, awaiting results

## 2023-12-01 NOTE — ASSESSMENT & PLAN NOTE
Per sister at bedside the patient kidney function has been normal  BUN 56, Cr. 4.18 likely to sepsis  Improving currently 2.61  Currently off of IV fluids due to history of CHF with EF of 15 to 20%  Encourage adequate p.o. intake. Monitor BMP. Avoid nephrotoxic agents.

## 2023-12-01 NOTE — NURSING NOTE
Pt was taken off [facility] due to poor profusion. Throughout the night this writer went in and attempted to warm pt hand, change finger location, and change the finger sensor to a new one. Readings were not accurate. When sensor working properly the pt was in the 90s SPO2. Pt without any complaints of sob. Breathing non-labored. No further patient complaints or orders at this time.

## 2023-12-01 NOTE — H&P
[address]  H&P  Name: Jesus Tamayo 76 y.o. male I MRN: [id number]  Unit/Bed#: -01 I Date of Admission: 11/30/2023   Date of Service: 11/30/2023 I Hospital Day: 0      Assessment/Plan   * Severe sepsis with acute organ dysfunction [facility])  Assessment & Plan  Patient meets sepsis criteria, WBC 21.28, lactic 2.8, Cr 4.18,  hypotension, afebrile,   CT of chest, abdomen and pelvis reveals  multi lobar pneumonia and severe upper lobe predominant pulmonary emphysema. CT of head shows no acute intracranial abnormality. Chronic microangiopathic changes and pansinus mucosal disease. Noted patient is hypotensive in ED, two boluses of NS administrated. Patient responded to fluids but became hypotensive again, 2 bolus of LR ordered, effective in ED.   RSV positive in respiratory panel   One time Zosyn given in ER, start cefepime at 2100 and vancomycin. Adjusted for renal doses. Vancomycin consult to pharmacy in place   Supplemental oxygen ordered if needed  Will continue to monitor trend on WBC with morning labs, lactic after 2 hours of initial is 1.8  Procalcitonin ordered, awaiting results    Pneumonia of both upper lobes due to infectious organism  Assessment & Plan  CT of chest, abdomen and pelvis reveals  multi lobar pneumonia. Severe upper lobe predominant pulmonary emphysema. Cefepime 1 gr and Vancomycin ordered. Adjusted to renal doses.    Sputum culture ordered  Will continue to monitor CBC and lactic trend  Respiratory protocol in place         Acute kidney injury [facility])  Assessment & Plan  Per sister at bedside the patient kidney function has been normal  BUN 56, Cr. 4.18 likely to sepsis  Avoid nephrotoxic medications  Will continue to monitor with daily BMP    RSV infection  Assessment & Plan  No shortness of breath reported  Found to be positive in the ED   Chest x ray ordered, results pending  Respiratory protocol ordered  02 sats 95 % on room air      Chronic systolic heart failure ([address])  Assessment & Plan  Wt Readings from Last 3 Encounters:   11/30/23 90 kg (198 lb 6.6 oz)   08/29/23 87.1 kg (192 lb)   04/11/23 90.7 kg (200 lb)   Per sister report, patient has been living with heart failure for many years  Hold  Entresto, will re-assess when medication can be re-started  Lasix on hold  due to hypotension, will re-evaluate in the morning to re-start medication  Fluid restriction on 1500 cc daily PO  Monitor daily weights and strict I&O's                  VTE Prophylaxis: Apixaban (Eliquis)  / sequential compression device   Code Status: Full code  Discussion with family: Sister at bedside    Anticipated Length of Stay:  Patient will be admitted on an Inpatient basis with an anticipated length of stay of   2 midnights. Justification for Hospital Stay: Severe sepsis    Total Time for Visit, including Counseling / Coordination of Care: 60 minutes. Greater than 50% of this total time spent on direct patient counseling and coordination of care. Past Medical History:    Past Medical History:   Diagnosis Date    Dementia ([address])     DVT (deep venous thrombosis) ([address])     lt leg       Chief Complaint:   Altered Mental Status and CVA/TIA-like Symptoms (Per ems sister called with c/o this am pt woke this 730 am with confusion and lt side weakness. Gait disturbance last night. Pt currently alert oriented to person and place confused to time.)      History of Present Illness:    Rodrigo Gutierrez is a 76 y.o. male with past medical history of [employer] with repair,  dementia, insomnia, HTN and chronic heart failure  who presents altered mental status. The patient is a poor historian and due to underlying dementia he does not provide the best information per sister who is his POA. Sister states that he has been acting different since this morning when he woke up. His residency is in an assisted living and he has some visiting nurses coming to see him periodically.  The sister was informed that Mr. Pisano was confused and presented some gait disturbance. He also had multiple episodes of incontinence of bowel and bladder. The visiting nurse told the sister that he was having stroke-like symptoms and sister decided to bring him to the ER. At his arrival a CT of the head was taken showing no acute intracranial abnormality, chronic microangiopathic changes and pansinus mucosal disease, otherwise negative. In this visit noted patient is hypotensive in the ER, blood work shows WBC of 21.28, lactic 2.8 and Cr. 4.18, he is also positive for RSV. The patient states he is fine but sister says that since yesterday he has had diarrhea episodes and noted he is weaker than usual. The patient and sister denies fever, chills, nausea, vomiting, diaphoresis, change in vision, lethargy, drop of face or any extremities, tingling or numbness. Patient and sister denied any other symptoms than the stated above. Review of Systems:    Review of Systems   Constitutional:  Positive for activity change, appetite change and fatigue. HENT: Negative. Eyes: Negative. Respiratory: Negative. Cardiovascular: Negative. Gastrointestinal: Negative. Endocrine: Negative. Genitourinary: Negative. Musculoskeletal: Negative. Allergic/Immunologic: Negative. Neurological: Negative. Hematological: Negative. Psychiatric/Behavioral: Negative. Past Surgical History:     Past Medical History:   Diagnosis Date    Dementia ([address])     DVT (deep venous thrombosis) ([address])     lt leg       Past Surgical History:   Procedure Laterality Date    ABDOMINAL AORTIC ANEURYSM REPAIR, OPEN      repaired x2       Meds/Allergies:    Prior to Admission medications    Medication Sig Start Date End Date Taking?  Authorizing Provider   aspirin (ECOTRIN LOW STRENGTH) 81 mg EC tablet Take 81 mg by mouth daily    Historical Provider, MD   atorvastatin (LIPITOR) 80 mg tablet  2/14/23   Historical Provider, MD carvedilol (COREG) 25 mg tablet  2/14/23   Historical Provider, MD   digoxin (LANOXIN) 0.125 mg tablet  2/14/23   Historical Provider, MD   Eliquis 5 MG  2/14/23   Historical Provider, MD Malvin Dennis 49-51 MG TABS  1/17/23   Historical Provider, MD   furosemide (LASIX) 40 mg tablet  2/14/23   Historical Provider, MD   Klor-Con M20 20 MEQ tablet  3/27/23   Historical Provider, MD   mirtazapine (REMERON) 30 mg tablet  2/14/23   Historical Provider, MD   pantoprazole (PROTONIX) 40 mg tablet  2/14/23   Historical Provider, MD   temazepam (RESTORIL) 30 mg capsule TAKE 1 TABLET AT BEDTIME WHEN NECESSARY FOR SLEEP 3/23/23   Historical Provider, MD     I have reviewed home medications with patient personally. Allergies: Allergies   Allergen Reactions    Shellfish-Derived Products - Food Allergy Hives       Social History:     Marital Status:    Occupation: retired/disabled  Patient Pre-hospital Living Situation: senior living  Patient Pre-hospital Level of Mobility: Independent  Patient Pre-hospital Diet Restrictions: None  Substance Use History:   Social History     Substance and Sexual Activity   Alcohol Use None     Social History     Tobacco Use   Smoking Status Former    Types: Cigarettes   Smokeless Tobacco Never     Social History     Substance and Sexual Activity   Drug Use Never       Family History:    History reviewed. No pertinent family history. Physical Exam:     Vitals:   Blood Pressure: (!) 92/49 (11/30/23 2155)  Pulse: 79 (11/30/23 2155)  Temperature: 98.2 °F (36.8 °C) (11/30/23 2032)  Temp Source: Oral (11/30/23 1722)  Respirations: 18 (11/30/23 2008)  Height: 6' (182.9 cm) (11/30/23 1722)  Weight - Scale: 90 kg (198 lb 6.6 oz) (11/30/23 2000)  SpO2: 96 % (11/30/23 2155)    Physical Exam  Vitals and nursing note reviewed. Constitutional:       Appearance: He is ill-appearing. HENT:      Head: Normocephalic and atraumatic.       Nose: Nose normal.      Mouth/Throat:      Mouth: Mucous membranes are moist.      Pharynx: Oropharynx is clear. Eyes:      Conjunctiva/sclera: Conjunctivae normal.      Pupils: Pupils are equal, round, and reactive to light. Cardiovascular:      Rate and Rhythm: Normal rate and regular rhythm. Pulses: Normal pulses. Heart sounds: Normal heart sounds. Comments: No edema  Pulmonary:      Effort: Pulmonary effort is normal.      Breath sounds: Rhonchi present. Comments: In right upper and lower lobe  Abdominal:      General: Bowel sounds are normal.      Palpations: Abdomen is soft. Musculoskeletal:         General: Normal range of motion. Cervical back: Normal range of motion. Skin:     General: Skin is warm and dry. Capillary Refill: Capillary refill takes less than 2 seconds. Neurological:      Mental Status: He is alert. Mental status is at baseline. Comments: Oriented x2, confused at times. Psychiatric:         Mood and Affect: Mood normal.         Behavior: Behavior normal.           Additional Data:     Lab Results: I have personally reviewed pertinent reports. Results from last 7 days   Lab Units 11/30/23  1746   WBC Thousand/uL 21.85*   HEMOGLOBIN g/dL 11.1*   HEMATOCRIT % 36.2*   PLATELETS Thousands/uL 130*   NEUTROS PCT % 86*   LYMPHS PCT % 5*   MONOS PCT % 8   EOS PCT % 0     Results from last 7 days   Lab Units 11/30/23  1746   SODIUM mmol/L 136   POTASSIUM mmol/L 5.0   CHLORIDE mmol/L 104   CO2 mmol/L 19*   BUN mg/dL 56*   CREATININE mg/dL 4.18*   ANION GAP mmol/L 13   CALCIUM mg/dL 9.1   ALBUMIN g/dL 4.0   TOTAL BILIRUBIN mg/dL 0.68   ALK PHOS U/L 87   ALT U/L 12   AST U/L 22   GLUCOSE RANDOM mg/dL 108     Results from last 7 days   Lab Units 11/30/23  1925   INR  2.09*             Results from last 7 days   Lab Units 11/30/23 2058 11/30/23 2052 11/30/23  1746   LACTIC ACID mmol/L  --  1.5 2.8*   PROCALCITONIN ng/ml 27.48*  --   --        Imaging: I have personally reviewed pertinent reports.       CT chest abdomen pelvis wo contrast   Final Result by Gm Archibald MD (11/30 2010)      Multi lobar pneumonia. Severe upper lobe predominant pulmonary emphysema. No acute findings in the abdomen or pelvis. The study was marked in [facility] for immediate notification. Workstation performed: [id number]         CT head without contrast   Final Result by Bianka Lobo MD (84/58 3018)      1. No acute intracranial abnormality. Chronic microangiopathic changes. 2.  Pansinus mucosal disease. Workstation performed: [id number]         XR chest 1 view portable    (Results Pending)       EKG, Pathology, and Other Studies Reviewed on Admission:   EKG: Normal sinus rhythm   Outpatient documentation reviewed when available     Allscripts / Epic Records Reviewed: Yes     ** Please Note: This note has been constructed using a voice recognition system.  **

## 2023-12-01 NOTE — CASE MANAGEMENT
Case Management Assessment & Discharge Planning Note    Patient name Shayla Gordon  Location /-21 MRN [id number]  : 1955 Date 2023       Current Admission Date: 2023  Current Admission Diagnosis:Severe sepsis with acute organ dysfunction [facility])   Patient Active Problem List    Diagnosis Date Noted    Severe sepsis with acute organ dysfunction ([address]) 2023    Acute kidney injury ([address]) 2023    RSV infection 2023    Chronic systolic heart failure ([address]) 2023    Pneumonia of both upper lobes due to infectious organism 2023    Status post endovascular aneurysm repair (EVAR) 2023    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 ([address]) 2023    Status post femorofemoral bypass surgery 2023    Cardiomyopathy ([address]) 2023    Hypertension 2023    Bilateral leg edema 2023      LOS (days): 1  Geometric Mean LOS (GMLOS) (days): 5.10  Days to GMLOS:4.2     OBJECTIVE:    Risk of Unplanned Readmission Score: 18.26         Current admission status: Inpatient       Preferred Pharmacy:   [facility] [address]  Phone: [phone] Fax: [phone]    Primary Care Provider: Gibran Nagel MD    Primary Insurance: [employer]  Secondary Insurance:     ASSESSMENT:  [facility] Proxies    There are no active Health Care Proxies on file. Readmission Root Cause  30 Day Readmission: No    Patient Information  Admitted from[de-identified] Facility  Mental Status: Confused, Alert  During Assessment patient was accompanied by: Not accompanied during assessment  Assessment information provided by[de-identified] Sister  Primary Caregiver: Self  Support Systems: Self, Home care staff, Friends/neighbors, Private Caregivers  [facility]: [address] [place] [address] do you live in?: [facility] entry access options.  Select all that apply.: No steps to enter home  Type of Current Residence: Facility, Apartment ([address])  Floor Level: 1  Upon entering residence, is there a bedroom on the main floor (no further steps)?: Yes  Upon entering residence, is there a bathroom on the main floor (no further steps)?: Yes  Living Arrangements: Lives Alone  Is patient a ?: No    Activities of Daily Living Prior to Admission  Functional Status: Independent  Completes ADLs independently?: Yes  Ambulates independently?: Yes  Does patient use assisted devices?: No  Does patient currently own DME?: No  Does patient have a history of Outpatient Therapy (PT/OT)?: No  Does the patient have a history of Short-Term Rehab?: No  Does patient have a history of HHC?: Yes (Traditional HHC)  Does patient currently have [address]?: No    Current Home Health Care  Type of Current Home Care Services: Home health aide, Nurse visit    Patient Information Continued  Income Source: Pension/senior care  Does patient have prescription coverage?: Yes  Does patient receive dialysis treatments?: No  Does patient have a history of substance abuse?: No  Does patient have a history of Mental Health Diagnosis?: No    PHQ 2/9 Screening   Reviewed PHQ 2/9 Depression Screening Score?: No    Means of Transportation  Means of Transport to Appts[de-identified] Friends      Housing Stability: Low Risk  (12/1/2023)    Housing Stability Vital Sign     Unable to Pay for Housing in the Last Year: No     Number of [address] in the Last Year: 2     Unstable Housing in the Last Year: No   Food Insecurity: No Food Insecurity (12/1/2023)    Hunger Vital Sign     Worried About Running Out of Food in the Last Year: Never true     [address] in the Last Year: Never true   Transportation Needs: No Transportation Needs (12/1/2023)    PRAPARE - Transportation     Lack of Transportation (Medical): No     Lack of Transportation (Non-Medical):  No   Utilities: Not At Risk (12/1/2023)    [facility] Utilities     Threatened with loss of utilities: No       DISCHARGE DETAILS:    Discharge planning discussed with[de-identified] sister Joceline Denise over phone  Freedom of Choice: Yes  Comments - Freedom of Choice: FOC maintained during discussion regarding discharge planning. Jose Alba is currently staying with patient to watch over him. He is in a senior living facility [facility], [address] [place] [address]. Had Traditional but service ends today. Is working with OP CM and [employer] aide services for patient to assist with ADLs. CM contacted family/caregiver?: Yes  Were Treatment Team discharge recommendations reviewed with patient/caregiver?: Yes  Did patient/caregiver verbalize understanding of patient care needs?: Yes  Were patient/caregiver advised of the risks associated with not following Treatment Team discharge recommendations?: Yes    Contacts  Patient Contacts: Joceline Denise,   Relationship to Patient[de-identified] Family  Contact Method: Phone  Reason/Outcome: Continuity of Care, Emergency Contact, Discharge Planning, Other (Comment) (POA)    [address]         Is the patient interested in [address] at discharge?: No    DME Referral Provided  Referral made for DME?: No    Other Referral/Resources/Interventions Provided:  Interventions: HHA  Referral Comments: Sister krissy is working on aide services, patient can return home to assisted living.  No CM needs anticipated, CM will continue to follow    Would you like to participate in our [facility] [employer] service program?  : No - Declined    Treatment Team Recommendation: Home with [facility]  Discharge Destination Plan[de-identified] Home with [address] [facility] [address] at Discharge : Family (sister Jose Alba)

## 2023-12-01 NOTE — PROGRESS NOTES
Charles Drew is a 76 y.o. male who is currently ordered Vancomycin IV with management by the Pharmacy Consult service. Relevant clinical data and objective / subjective history reviewed. Vancomycin Assessment:  Indication and Goal AUC/Trough: Pneumonia (goal -600, trough >10), -600, trough >10  Clinical Status: stable  Micro:   pending  Renal Function:  SCr: 4.18 mg/dL  CrCl: 18.6 mL/min  Renal replacement: Not on dialysis  Days of Therapy: 1  Current Dose: 2000mg loading dose  Vancomycin Plan:  New Dosing: 10mg/kg QD prn if level <15 (pulse dosing)  Estimated AUC: N/A mcg*hr/mL  Estimated Trough: <15 mcg/mL  Next Level: 12/2/23  Renal Function Monitoring: Daily BMP and UOP  Pharmacy will continue to follow closely for s/sx of nephrotoxicity, infusion reactions and appropriateness of therapy. BMP and CBC will be ordered per protocol. We will continue to follow the patient’s culture results and clinical progress daily.     Sarthak Latham, Pharmacist

## 2023-12-01 NOTE — PLAN OF CARE
Problem: INFECTION - ADULT  Goal: Absence or prevention of progression during hospitalization  Description: INTERVENTIONS:  - Assess and monitor for signs and symptoms of infection  - Monitor lab/diagnostic results  - Monitor all insertion sites, i.e. indwelling lines, tubes, and drains  - Monitor endotracheal if appropriate and nasal secretions for changes in amount and color  - [facility] appropriate cooling/warming therapies per order  - Administer medications as ordered  - Instruct and encourage patient and family to use good hand hygiene technique  - Identify and instruct in appropriate isolation precautions for identified infection/condition  Outcome: Progressing

## 2023-12-01 NOTE — PHYSICAL THERAPY NOTE
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)    Status post femorofemoral bypass surgery    Cardiomyopathy ([address])    Hypertension    Bilateral leg edema    Status post endovascular aneurysm repair (EVAR)    Severe sepsis with acute organ dysfunction ([address])    Acute kidney injury ([address])    RSV infection    Chronic systolic heart failure ([address])    Pneumonia of both upper lobes due to infectious organism     Past Medical History  Past Medical History:   Diagnosis Date    Dementia ([address])     DVT (deep venous thrombosis) ([address])     lt leg     Past Surgical History  Past Surgical History:   Procedure Laterality Date    ABDOMINAL AORTIC ANEURYSM REPAIR, OPEN      repaired x2      12/01/23 0928   PT Last Visit   PT Visit Date 12/01/23   Note Type   Note type Evaluation   Pain Assessment   Pain Assessment Tool 0-10   Pain Score No Pain   Restrictions/Precautions   Weight Bearing Precautions Per Order No   Braces or Orthoses Other (Comment)  (history of knee brace-does not use)   Other Precautions Contact/isolation;Droplet precautions;Multiple lines; Fall Risk; Chair Alarm; Bed Alarm;Cognitive  (+RSV)   Home Living   Type of Home Apartment  (1st floor)   Home Layout One level; Able to live on main level with bedroom/bathroom; Performs ADLs on one level;Elevator  (No BRI)   Bathroom Shower/Tub Tub/shower unit   [employer] Raised   Bathroom Equipment Grab bars in shower;Grab bars around toilet   [address] Other (Comment)  (none per patient)   Additional Comments Pt ambulates without an AD.    Prior Function   Level of [place] Independent with functional mobility; Independent with ADLs; Independent with IADLS   Lives With Alone  (sister has been staying with patient for the last 2-3 weeks)   Receives Help From Family; Neighbor;Home health  (sister; home health nurse)   IADLs Independent with meal prep; Family/Friend/Other provides transportation; Family/Friend/Other provides medication management  (meal delivery service)   Falls in the last 6 months 0  (sister reports almost 2 falls)   Vocational Retired   General   Family/Caregiver Present Yes  (pt's sister)   Cognition   Overall Cognitive Status Impaired   Arousal/Participation Alert   Attention Within functional limits   Orientation Level Oriented to person;Oriented to place; Disoriented to situation  (inconsistent to time)   Memory Decreased recall of recent events;Decreased short term memory   Following Commands Follows one step commands without difficulty   Comments Pt agreeable to PT. Subjective   Subjective "I haven't tried to get up yet."   RLE Assessment   RLE Assessment X   Strength RLE   RLE Overall Strength 3+/5   LLE Assessment   LLE Assessment X   Strength LLE   LLE Overall Strength 4-/5   Light Touch   RLE Light Touch Grossly intact   LLE Light Touch Grossly intact   Bed Mobility   Supine to Sit 5  Supervision   Additional items Assist x 1;HOB elevated; Bedrails; Increased time required;Verbal cues   Transfers   Sit to Stand 5  Supervision   Additional items Assist x 1; Increased time required;Verbal cues   Stand to Sit 5  Supervision   Additional items Assist x 1; Increased time required;Verbal cues;Armrests   Ambulation/Elevation   Gait pattern Short stride; Shuffling   Gait Assistance   (CG assist)   Additional items Assist x 1;Verbal cues   Assistive Device None   Distance 30 feet   Balance   Static Sitting Good   Dynamic Sitting Fair +   Static Standing Fair   Dynamic Standing Fair -   Ambulatory Fair -   Endurance Deficit   Endurance Deficit Yes   Endurance Deficit Description decreased activity tolerance   Activity Tolerance   Activity Tolerance Patient tolerated treatment well   Medical Staff Made Aware OT Crescencio Haque  (Co-evaluation performed with OT secondary to complex medical condition of patient and regression of functional status from baseline. PT/OT goals were addressed separately.)   Nurse Made Aware CASE [facility]   Assessment   Prognosis Good   Problem List Decreased strength;Decreased endurance; Impaired balance;Decreased mobility; Decreased cognition   Assessment Pt is 76year old male seen for PT evaluation s/p admit to [id number] [facility] on 11/30/2023 with Severe sepsis with acute organ dysfunction ([address] PT consulted to assess pt's functional mobility and discharge needs. Order placed for PT evaluation and treatment, with up and out of bed as tolerated order. Comorbidities affecting pt's physical performance at time of assessment include cardiomyopathy, acute kidney injury, RSV infection, chronic systolic heart failure, and pneumonia of both upper lobes due to infectious organism. Prior to hospitalization, pt was independent with all functional mobility without an AD. Pt ambulates unrestricted distances on all terrain and elevations. Pt resides alone in a one level apartment, with no steps to enter. Personal factors affecting pt at time of initial evaluation include inability to ambulate community distances, difficulty navigating level surfaces without external assistance, difficulty performing dynamic tasks in the community, limited home support, and difficulty performing ADLs and IADLs. Please find objective findings from PT assessment regarding body systems outlined above with impairments and limitations including weakness, impaired balance, decreased endurance, gait deviations, decreased activity tolerance, decreased functional mobility tolerance, fall risk, and decreased cognition.  The following objective measures were performed on initial evaluation [place] Index: 55/100, Modified [place]: 3 (moderate disability), and AM-PAC 6-Clicks: 26/70. Pt's clinical presentation is currently evolving seen in pt's presentation of need for ongoing medical management/monitoring, pt is a fall risk, and pt requires cues and assist for safety with functional mobility. Pt to benefit from continued PT treatment to address deficits as defined above and maximize pt's level of function and independence with mobility. From a PT standpoint, recommendation at time of discharge would be level 3, minimal resource intensity, with increased family support, in order to facilitate return to prior level of function. Barriers to Discharge Decreased caregiver support   Goals   STG Expiration Date 12/11/23   Short Term Goal #1 In 10 days: Increase bilateral LE strength 1/2 grade to facilitate independent mobility, Perform all bed mobility tasks modified independent to decrease caregiver burden, Perform all transfers modified independent to improve independence, Ambulate > 150 ft. with least restrictive assistive device modified independent w/o LOB and w/ normalized gait pattern 100% of the time, and Increase all balance 1/2 grade to decrease risk for falls   Plan   Treatment/Interventions Functional transfer training;LE strengthening/ROM; Therapeutic exercise; Endurance training;Cognitive reorientation;Patient/family training;Bed mobility;Gait training;Spoke to nursing;OT;Family   PT Frequency 2-3x/wk   Discharge Recommendation   Rehab Resource Intensity Level, PT III (Minimum Resource Intensity)  (and family support)   AM-PAC Basic Mobility Inpatient   Turning in Flat Bed Without Bedrails 3   Lying on Back to Sitting on Edge of Flat Bed Without Bedrails 3   Moving Bed to Chair 3   Standing Up From Chair Using Arms 3   Walk in Room 3   Climb 3-5 Stairs With Railing 3   Basic Mobility Inpatient Raw Score 18   Basic Mobility Standardized Score 41.05   Highest Level Of Mobility   -[facility] Goal 6: Walk 10 steps or more   -HLM Achieved 7: Walk 25 feet or more   Modified [place] Scale   Modified [place] Scale 3   Barthel Index   Feeding 10   Bathing 0   Grooming Score 5   Dressing Score 5   Bladder Score 10   Bowels Score 10   Toilet Use Score 5   Transfers (Bed/Chair) Score 10   Mobility (Level Surface) Score 0   Stairs Score 0   Barthel Index Score 55     PT Evaluation Time: 9187-0071  Gabino Zavala, PT, DPT

## 2023-12-01 NOTE — RESPIRATORY THERAPY NOTE
RT Protocol Note  Joao Koroma 76 y.o. male MRN: [id number]  Unit/Bed#: -01 Encounter: [id number]    Assessment    Principal Problem:    Severe sepsis with acute organ dysfunction ([address])  Active Problems:    Cardiomyopathy ([address])    Acute kidney injury ([address])    RSV infection    Chronic systolic heart failure (HCC)    Pneumonia of both upper lobes due to infectious organism      Home Pulmonary Medications:    Past Medical History:   Diagnosis Date    Dementia ([address])     DVT (deep venous thrombosis) ([address])     lt leg     Social History     Socioeconomic History    Marital status:      Spouse name: None    Number of children: None    Years of education: None    Highest education level: None   Occupational History    None   Tobacco Use    Smoking status: Former     Types: Cigarettes    Smokeless tobacco: Never   Vaping Use    Vaping Use: Never used   Substance and Sexual Activity    Alcohol use: None    Drug use: Never    Sexual activity: None   Other Topics Concern    None   Social History Narrative    None     Social Determinants of Health     Financial Resource Strain: Not on file   Food Insecurity: Not on file   Transportation Needs: Not on file   Physical Activity: Not on file   Stress: Not on file   Social Connections: Not on file   Intimate Partner Violence: Not on file   Housing Stability: Not on file       Subjective         Objective    Physical Exam:   Respiratory Pattern: Normal  Chest Assessment: Trachea midline  Bilateral Breath Sounds: Diminished    Vitals:  Blood pressure 100/53, pulse 81, temperature 98.1 °F (36.7 °C), resp. rate 18, height 6' (1.829 m), weight 90 kg (198 lb 6.6 oz), SpO2 95 %.           Imaging and other studies:           Plan    Respiratory Plan: No distress/Pulmonary history        Resp Comments: pt has no pulmonary PMH, no home respiratory meds, added PRN MDI for RSV

## 2023-12-01 NOTE — ASSESSMENT & PLAN NOTE
Patient does have underlying history of dementia. Presented with metabolic encephalopathy in settings of severe sepsis as evident by increased confusion superimposed on dementia requiring CT head. Now resolved after fluid resuscitation and IV antibiotics per  Currently is awake, alert, oriented x 3. Sister at the bedside also reports patient is currently at baseline.

## 2023-12-01 NOTE — ASSESSMENT & PLAN NOTE
Patient meets sepsis criteria, WBC 21.28, lactic 2.8, Cr 4.18,  hypotension, afebrile,   CT of chest, abdomen and pelvis reveals  multi lobar pneumonia and severe upper lobe predominant pulmonary emphysema. CT of head shows no acute intracranial abnormality. Chronic microangiopathic changes and pansinus mucosal disease. Noted patient is hypotensive in ED, two boluses of NS administrated. Patient responded to fluids but became hypotensive again, 2 bolus of LR ordered, effective in ED.   RSV positive in respiratory panel   COVID-19/flu negative. Leukocytosis, procalcitonin improving. Cardiomegaly patient appears comfortable and not in distress. Saturation 95% on room air. Noted with hypotension however clinically asymptomatic and acutely nontoxic appearing. Will try gentle IV fluid and IV albumin with caution due to history of CHF with low EF. Continue with IV vancomycin, cefepime for now. Continue to follow-up with pending blood cultures, sputum Gram stain, culture, nasal MRSA swab. Continue with supportive care.

## 2023-12-01 NOTE — ASSESSMENT & PLAN NOTE
CT of chest, abdomen and pelvis reveals  multi lobar pneumonia. Severe upper lobe predominant pulmonary emphysema. No noted elevated procalcitonin  Leukocytosis, procalcitonin not downtrending. Follow-up with nasal MRSA swab, sputum Gram stain and culture. Currently on IV cefepime, vancomycin   Continue supportive care.

## 2023-12-01 NOTE — ASSESSMENT & PLAN NOTE
No shortness of breath reported  Found to be positive in the ED   CT chest report noted with pulmonary emphysema, multifocal pneumonia. Respiratory protocol ordered  02 sats 95 % on room air  Continue with supportive care.

## 2023-12-01 NOTE — PLAN OF CARE
Problem: PAIN - ADULT  Goal: Verbalizes/displays adequate comfort level or baseline comfort level  Description: Interventions:  - Encourage patient to monitor pain and request assistance  - Assess pain using appropriate pain scale  - Administer analgesics based on type and severity of pain and evaluate response  - Implement non-pharmacological measures as appropriate and evaluate response  - Consider cultural and social influences on pain and pain management  - Notify physician/advanced practitioner if interventions unsuccessful or patient reports new pain  Outcome: Progressing     Problem: INFECTION - ADULT  Goal: Absence or prevention of progression during hospitalization  Description: INTERVENTIONS:  - Assess and monitor for signs and symptoms of infection  - Monitor lab/diagnostic results  - Monitor all insertion sites, i.e. indwelling lines, tubes, and drains  - Monitor endotracheal if appropriate and nasal secretions for changes in amount and color  - [place] appropriate cooling/warming therapies per order  - Administer medications as ordered  - Instruct and encourage patient and family to use good hand hygiene technique  - Identify and instruct in appropriate isolation precautions for identified infection/condition  Outcome: Progressing  Goal: Absence of fever/infection during neutropenic period  Description: INTERVENTIONS:  - Monitor WBC    Outcome: Progressing     Problem: SAFETY ADULT  Goal: Patient will remain free of falls  Description: INTERVENTIONS:  - Educate patient/family on patient safety including physical limitations  - Instruct patient to call for assistance with activity   - Consult OT/PT to assist with strengthening/mobility   - Keep Call bell within reach  - Keep bed low and locked with side rails adjusted as appropriate  - Keep care items and personal belongings within reach  - Initiate and maintain comfort rounds  - Make Fall Risk Sign visible to staff  - Apply yellow socks and bracelet for high fall risk patients  - Consider moving patient to room near nurses station  Outcome: Progressing  Goal: Maintain or return to baseline ADL function  Description: INTERVENTIONS:  -  Assess patient's ability to carry out ADLs; assess patient's baseline for ADL function and identify physical deficits which impact ability to perform ADLs (bathing, care of mouth/teeth, toileting, grooming, dressing, etc.)  - Assess/evaluate cause of self-care deficits   - Assess range of motion  - Assess patient's mobility; develop plan if impaired  - Assess patient's need for assistive devices and provide as appropriate  - Encourage maximum independence but intervene and supervise when necessary  - Involve family in performance of ADLs  - Assess for home care needs following discharge   - Consider OT consult to assist with ADL evaluation and planning for discharge  - Provide patient education as appropriate  Outcome: Progressing  Goal: Maintains/Returns to pre admission functional level  Description: INTERVENTIONS:  - Perform AM-PAC 6 Click Basic Mobility/ Daily Activity assessment daily.  - Set and communicate daily mobility goal to care team and patient/family/caregiver.    - Collaborate with rehabilitation services on mobility goals if consulted  - Out of bed for toileting  - Record patient progress and toleration of activity level   Outcome: Progressing     Problem: DISCHARGE PLANNING  Goal: Discharge to home or other facility with appropriate resources  Description: INTERVENTIONS:  - Identify barriers to discharge w/patient and caregiver  - Arrange for needed discharge resources and transportation as appropriate  - Identify discharge learning needs (meds, wound care, etc.)  - Arrange for interpretive services to assist at discharge as needed  - Refer to Case Management Department for coordinating discharge planning if the patient needs post-hospital services based on physician/advanced practitioner order or complex needs related to functional status, cognitive ability, or social support system  Outcome: Progressing     Problem: Knowledge Deficit  Goal: Patient/family/caregiver demonstrates understanding of disease process, treatment plan, medications, and discharge instructions  Description: Complete learning assessment and assess knowledge base.   Interventions:  - Provide teaching at level of understanding  - Provide teaching via preferred learning methods  Outcome: Progressing

## 2023-12-01 NOTE — ASSESSMENT & PLAN NOTE
Last cath in 2020 at an outside hospital which shows ischemic cardiomyopathy ejection fraction 15 to 20%.     Patient has a defibrillator in place

## 2023-12-01 NOTE — ASSESSMENT & PLAN NOTE
No shortness of breath reported  Found to be positive in the ED   Chest x ray ordered, results pending  Respiratory protocol ordered

## 2023-12-01 NOTE — ASSESSMENT & PLAN NOTE
Per sister at bedside the patient kidney function has been normal  BUN 56, Cr. 4.18 likely to sepsis  Avoid nephrotoxic medications  Will continue to monitor with daily BMP

## 2023-12-01 NOTE — PROGRESS NOTES
[address]  Progress Note  Name: Jesus Tamayo I  MRN: [id number]  Unit/Bed#: -01 I Date of Admission: 11/30/2023   Date of Service: 12/1/2023 I Hospital Day: 1    Assessment/Plan   * Severe sepsis with acute organ dysfunction [facility])  Assessment & Plan  Patient meets sepsis criteria, WBC 21.28, lactic 2.8, Cr 4.18,  hypotension, afebrile,   CT of chest, abdomen and pelvis reveals  multi lobar pneumonia and severe upper lobe predominant pulmonary emphysema. CT of head shows no acute intracranial abnormality. Chronic microangiopathic changes and pansinus mucosal disease. Noted patient is hypotensive in ED, two boluses of NS administrated. Patient responded to fluids but became hypotensive again, 2 bolus of LR ordered, effective in ED.   RSV positive in respiratory panel   COVID-19/flu negative. Leukocytosis, procalcitonin improving. Cardiomegaly patient appears comfortable and not in distress. Saturation 95% on room air. Noted with hypotension however clinically asymptomatic and acutely nontoxic appearing. Will try gentle IV fluid and IV albumin with caution due to history of CHF with low EF. Continue with IV vancomycin, cefepime for now. Continue to follow-up with pending blood cultures, sputum Gram stain, culture, nasal MRSA swab. Continue with supportive care. PAD (peripheral artery disease) ([facility])  Assessment & Plan  History of CAD, PAD, CVA currently on aspirin, Eliquis, statin. Metabolic encephalopathy  Assessment & Plan  Patient does have underlying history of dementia. Presented with metabolic encephalopathy in settings of severe sepsis as evident by increased confusion superimposed on dementia requiring CT head. Now resolved after fluid resuscitation and IV antibiotics per  Currently is awake, alert, oriented x 3. Sister at the bedside also reports patient is currently at baseline.     Pneumonia of both upper lobes due to infectious organism  Assessment & Plan  CT of chest, abdomen and pelvis reveals  multi lobar pneumonia. Severe upper lobe predominant pulmonary emphysema. No noted elevated procalcitonin  Leukocytosis, procalcitonin not downtrending. Follow-up with nasal MRSA swab, sputum Gram stain and culture. Currently on IV cefepime, vancomycin   Continue supportive care. Chronic systolic heart failure (HCC)  Assessment & Plan  Wt Readings from Last 3 Encounters:   11/30/23 90 kg (198 lb 6.6 oz)   08/29/23 87.1 kg (192 lb)   04/11/23 90.7 kg (200 lb)   Per sister report, patient has been living with heart failure for many years  Previous echo form care everywhere note with EF 15-20%, s/p ICD in place  Hold  Entresto for ANNETTA  HOLD lasix for ANNETTA  Fluid restriction on 1500 cc daily PO  Monitor daily weights and strict I&O's            RSV infection  Assessment & Plan  No shortness of breath reported  Found to be positive in the ED   CT chest report noted with pulmonary emphysema, multifocal pneumonia. Respiratory protocol ordered  02 sats 95 % on room air  Continue with supportive care. Acute kidney injury [facility])  Assessment & Plan  Per sister at bedside the patient kidney function has been normal  BUN 56, Cr. 4.18 likely to sepsis  Improving currently 2.61  Currently off of IV fluids due to history of CHF with EF of 15 to 20%  Encourage adequate p.o. intake. Monitor BMP. Avoid nephrotoxic agents. Cardiomyopathy [facility])  Assessment & Plan  Last cath in 2020 at an outside hospital which shows ischemic cardiomyopathy ejection fraction 15 to 20%. Patient has a defibrillator in place                VTE Pharmacologic Prophylaxis: VTE Score: 7 Moderate Risk (Score 3-4) - Pharmacological DVT Prophylaxis Ordered: apixaban (Eliquis).     Mobility:   Basic Mobility Inpatient Raw Score: 18  JH-HLM Goal: 6: Walk 10 steps or more  JH-HLM Achieved: 7: Walk 25 feet or more      Patient Centered Rounds: I performed bedside rounds with nursing staff today. Education and Discussions with Family / Patient: Updated  (sister) at bedside. Total Time Spent on Date of Encounter in care of patient: 35 mins. This time was spent on one or more of the following: performing physical exam; counseling and coordination of care; obtaining or reviewing history; documenting in the medical record; reviewing/ordering tests, medications or procedures; communicating with other healthcare professionals and discussing with patient's family/caregivers. Current Length of Stay: 1 day(s)  Current Patient Status: Inpatient   Certification Statement: The patient will continue to require additional inpatient hospital stay due to severe sepsis secondary to pneumonia improving slowly. Discharge Plan: Anticipate discharge in 24-48 hrs to discharge location to be determined pending rehab evaluations. Code Status: Level 1 - Full Code    Subjective:   Seen during a.m. rounds. Patient seen sitting in chair. Appears comfortable nondistressed. Noted with hypotension at times however clinically asymptomatic and acutely nontoxic appearing. Currently he is awake, alert, O x 3. Denies any new complaints and reports overall feeling better than yesterday. Sister at the bedside also reports he is appearing much better than yesterday. No other events reported. Objective:     Vitals:   Temp (24hrs), Av °F (36.7 °C), Min:97.2 °F (36.2 °C), Max:98.8 °F (37.1 °C)    Temp:  [97.2 °F (36.2 °C)-98.8 °F (37.1 °C)] 97.2 °F (36.2 °C)  HR:  [43-95] 95  Resp:  [15-20] 15  BP: ()/(46-62) 87/51  SpO2:  [91 %-100 %] 95 %  Body mass index is 25.21 kg/m². Input and Output Summary (last 24 hours): Intake/Output Summary (Last 24 hours) at 2023 1723  Last data filed at 2023 0700  Gross per 24 hour   Intake 2000 ml   Output 1700 ml   Net 300 ml       Physical Exam:   Physical Exam  Constitutional:       General: He is not in acute distress. Appearance: Normal appearance. He is not ill-appearing, toxic-appearing or diaphoretic. HENT:      Head: Normocephalic and atraumatic. Eyes:      Pupils: Pupils are equal, round, and reactive to light. Cardiovascular:      Rate and Rhythm: Normal rate. Pulses: Normal pulses. Pulmonary:      Effort: Pulmonary effort is normal. No respiratory distress. Breath sounds: Rhonchi present. No wheezing. Abdominal:      General: Bowel sounds are normal. There is no distension. Palpations: Abdomen is soft. Tenderness: There is no abdominal tenderness. Musculoskeletal:      Cervical back: No rigidity. Right lower leg: No edema. Left lower leg: No edema. Neurological:      Mental Status: He is alert and oriented to person, place, and time.    Psychiatric:         Mood and Affect: Mood normal.         Behavior: Behavior normal.          Additional Data:     Labs:  Results from last 7 days   Lab Units 12/01/23 0458   WBC Thousand/uL 13.68*   HEMOGLOBIN g/dL 10.7*   HEMATOCRIT % 35.0*   PLATELETS Thousands/uL 112*   NEUTROS PCT % 85*   LYMPHS PCT % 6*   MONOS PCT % 8   EOS PCT % 0     Results from last 7 days   Lab Units 12/01/23 0458 11/30/23  1746   SODIUM mmol/L 137 136   POTASSIUM mmol/L 4.4 5.0   CHLORIDE mmol/L 109* 104   CO2 mmol/L 19* 19*   BUN mg/dL 42* 56*   CREATININE mg/dL 2.61* 4.18*   ANION GAP mmol/L 9 13   CALCIUM mg/dL 8.1* 9.1   ALBUMIN g/dL  --  4.0   TOTAL BILIRUBIN mg/dL  --  0.68   ALK PHOS U/L  --  87   ALT U/L  --  12   AST U/L  --  22   GLUCOSE RANDOM mg/dL 83 108     Results from last 7 days   Lab Units 11/30/23  1925   INR  2.09*             Results from last 7 days   Lab Units 12/01/23 0458 11/30/23 2058 11/30/23 2052 11/30/23  1746   LACTIC ACID mmol/L  --   --  1.5 2.8*   PROCALCITONIN ng/ml 15.75* 27.48*  --   --        Lines/Drains:  Invasive Devices       Peripheral Intravenous Line  Duration             Peripheral IV 11/30/23 Left;Proximal;Ventral (anterior) Forearm <1 day                          Imaging: Reviewed radiology reports from this admission including: chest CT scan    Recent Cultures (last 7 days):   Results from last 7 days   Lab Units 12/01/23  0936 11/30/23  2301 11/30/23  1930 11/30/23  1925   BLOOD CULTURE   --   --  Received in Microbiology Lab. Culture in Progress. Received in Microbiology Lab. Culture in Progress. GRAM STAIN RESULT  1+ Epithelial cells per low power field*  Rare Disintegrating polys*  1+ Gram positive cocci in pairs and chains*  1+ Budding yeast*  Rare Gram negative rods*  --   --   --    C DIFF TOXIN B BY PCR   --  Negative  --   --        Last 24 Hours Medication List:   Current Facility-Administered Medications   Medication Dose Route Frequency Provider Last Rate    acetaminophen  650 mg Oral Q6H PRN GRETEL Muñiz      apixaban  5 mg Oral Daily GRETEL Muñiz      aspirin  81 mg Oral Daily GRETEL Muñiz      atorvastatin  80 mg Oral Daily With [place] Bliss      carvedilol  25 mg Oral BID With Meals GRETEL Muñiz      cefepime  1,000 mg Intravenous Q12H GRETEL Muñiz 1,000 mg (12/01/23 0817)    docusate sodium  100 mg Oral BID GRETEL Muñiz      mirtazapine  30 mg Oral HS GRETEL Muñiz      ondansetron  4 mg Intravenous Q6H PRN GRETEL Muñiz      temazepam  30 mg Oral HS PRN GRETEL Muñiz      vancomycin  10 mg/kg Intravenous Daily PRN DavidGRETEL Tucker          Today, Patient Was Seen By: Kelby Mcarthur MD    **Please Note: This note may have been constructed using a voice recognition system. **

## 2023-12-01 NOTE — PLAN OF CARE
Problem: PAIN - ADULT  Goal: Verbalizes/displays adequate comfort level or baseline comfort level  Description: Interventions:  - Encourage patient to monitor pain and request assistance  - Assess pain using appropriate pain scale  - Administer analgesics based on type and severity of pain and evaluate response  - Implement non-pharmacological measures as appropriate and evaluate response  - Consider cultural and social influences on pain and pain management  - Notify physician/advanced practitioner if interventions unsuccessful or patient reports new pain  Outcome: Progressing     Problem: INFECTION - ADULT  Goal: Absence or prevention of progression during hospitalization  Description: INTERVENTIONS:  - Assess and monitor for signs and symptoms of infection  - Monitor lab/diagnostic results  - Monitor all insertion sites, i.e. indwelling lines, tubes, and drains  - Monitor endotracheal if appropriate and nasal secretions for changes in amount and color  - [place] appropriate cooling/warming therapies per order  - Administer medications as ordered  - Instruct and encourage patient and family to use good hand hygiene technique  - Identify and instruct in appropriate isolation precautions for identified infection/condition  Outcome: Progressing

## 2023-12-01 NOTE — Clinical Note
Patient is a 76 y.o. male seen for OT evaluation s/p admit to [address] [place] [address] on 11/30/2023 w/Severe sepsis with acute organ dysfunction ([address]). Commorbidities affecting patient's functional performance at time of assessment include: Severe sepsis, Pneumonia of both Upper lobes,  ANNETTA, RSV infection, Chronic systolic heart failure,  Orders placed for OT evaluation and treatment. Performed at least two patient identifiers during session including name and wristband. Prior to admission, *** . Personal factors affecting patient at time of initial evaluation include: {RV personal factors:06147}. Upon evaluation, patient requires {RVOTassistlevel:08881} assist for UB ADLs, {RVOTassistlevel:31565} assist for LB ADLs, transfers and functional ambulation in room and bathroom with {RVOTassistlevel:89751} assist, with the use of {RVDevices:24819}. Patient is {RVcogstatus:52477}, presents with {RVcognitive integration:50957}, presents with {RVpsychosocial:96471}. Occupational performance is affected by the following deficits: {RV general deficits:78145}. Patient to benefit from continued Occupational Therapy treatment while in the hospital to address deficits as defined above and maximize level of functional independence with ADLs and functional mobility. Occupational Performance areas to address include: {RV Performance Areas:58957}. From OT standpoint, recommendation at time of d/c would be {RV recommendation:56325}.

## 2023-12-01 NOTE — NURSING NOTE
Pt has an EF of 15%. This writer contacted the on call provider Jerica Shah to ask if telemetry is needed. Provider does not want telemetry at this time. No further orders or patient complaints at this time.

## 2023-12-02 LAB
ANION GAP SERPL CALCULATED.3IONS-SCNC: 6 MMOL/L
BUN SERPL-MCNC: 24 MG/DL (ref 5–25)
CALCIUM SERPL-MCNC: 8 MG/DL (ref 8.4–10.2)
CHLORIDE SERPL-SCNC: 111 MMOL/L (ref 96–108)
CO2 SERPL-SCNC: 22 MMOL/L (ref 21–32)
CREAT SERPL-MCNC: 1.36 MG/DL (ref 0.6–1.3)
ERYTHROCYTE [DISTWIDTH] IN BLOOD BY AUTOMATED COUNT: 18.6 % (ref 11.6–15.1)
GFR SERPL CREATININE-BSD FRML MDRD: 53 ML/MIN/1.73SQ M
GLUCOSE SERPL-MCNC: 110 MG/DL (ref 65–140)
HCT VFR BLD AUTO: 34.5 % (ref 36.5–49.3)
HGB BLD-MCNC: 10.6 G/DL (ref 12–17)
MAGNESIUM SERPL-MCNC: 2.3 MG/DL (ref 1.9–2.7)
MCH RBC QN AUTO: 25.3 PG (ref 26.8–34.3)
MCHC RBC AUTO-ENTMCNC: 30.7 G/DL (ref 31.4–37.4)
MCV RBC AUTO: 82 FL (ref 82–98)
MRSA NOSE QL CULT: NORMAL
PLATELET # BLD AUTO: 111 THOUSANDS/UL (ref 149–390)
POTASSIUM SERPL-SCNC: 3.7 MMOL/L (ref 3.5–5.3)
RBC # BLD AUTO: 4.19 MILLION/UL (ref 3.88–5.62)
SODIUM SERPL-SCNC: 139 MMOL/L (ref 135–147)
VANCOMYCIN SERPL-MCNC: 7.3 UG/ML (ref 10–20)
WBC # BLD AUTO: 5.82 THOUSAND/UL (ref 4.31–10.16)

## 2023-12-02 PROCEDURE — 99232 SBSQ HOSP IP/OBS MODERATE 35: CPT | Performed by: STUDENT IN AN ORGANIZED HEALTH CARE EDUCATION/TRAINING PROGRAM

## 2023-12-02 PROCEDURE — 85027 COMPLETE CBC AUTOMATED: CPT | Performed by: STUDENT IN AN ORGANIZED HEALTH CARE EDUCATION/TRAINING PROGRAM

## 2023-12-02 PROCEDURE — 83735 ASSAY OF MAGNESIUM: CPT | Performed by: STUDENT IN AN ORGANIZED HEALTH CARE EDUCATION/TRAINING PROGRAM

## 2023-12-02 PROCEDURE — 87449 NOS EACH ORGANISM AG IA: CPT | Performed by: STUDENT IN AN ORGANIZED HEALTH CARE EDUCATION/TRAINING PROGRAM

## 2023-12-02 PROCEDURE — 80202 ASSAY OF VANCOMYCIN: CPT

## 2023-12-02 PROCEDURE — 80048 BASIC METABOLIC PNL TOTAL CA: CPT | Performed by: STUDENT IN AN ORGANIZED HEALTH CARE EDUCATION/TRAINING PROGRAM

## 2023-12-02 RX ORDER — CEFTRIAXONE 1 G/50ML
1000 INJECTION, SOLUTION INTRAVENOUS EVERY 24 HOURS
Status: DISCONTINUED | OUTPATIENT
Start: 2023-12-02 | End: 2023-12-03

## 2023-12-02 RX ORDER — VANCOMYCIN HYDROCHLORIDE 1 G/200ML
1000 INJECTION, SOLUTION INTRAVENOUS ONCE
Status: COMPLETED | OUTPATIENT
Start: 2023-12-02 | End: 2023-12-02

## 2023-12-02 RX ADMIN — ATORVASTATIN CALCIUM 80 MG: 40 TABLET, FILM COATED ORAL at 17:20

## 2023-12-02 RX ADMIN — VANCOMYCIN HYDROCHLORIDE 1000 MG: 1 INJECTION, SOLUTION INTRAVENOUS at 08:09

## 2023-12-02 RX ADMIN — CEFTRIAXONE 1000 MG: 1 INJECTION, SOLUTION INTRAVENOUS at 09:57

## 2023-12-02 RX ADMIN — ASPIRIN 81 MG: 81 TABLET, COATED ORAL at 08:12

## 2023-12-02 RX ADMIN — APIXABAN 5 MG: 5 TABLET, FILM COATED ORAL at 22:02

## 2023-12-02 RX ADMIN — CARVEDILOL 25 MG: 12.5 TABLET, FILM COATED ORAL at 08:14

## 2023-12-02 RX ADMIN — MIRTAZAPINE 30 MG: 15 TABLET, FILM COATED ORAL at 22:02

## 2023-12-02 NOTE — PROGRESS NOTES
Porsha Taylor is a 76 y.o. male who is currently ordered Vancomycin IV with management by the Pharmacy Consult service. Relevant clinical data and objective / subjective history reviewed. Vancomycin Assessment:  Indication and Goal AUC/Trough: Pneumonia (goal -600, trough >10), -600, trough >10  Clinical Status: stable  Micro:       Renal Function:  SCr: 1.36 mg/dL  CrCl: 51 mL/min  Renal replacement: Not on dialysis  Days of Therapy: 3  Current Dose: 1000mg IV daily PRN when vanco level <15  Vancomycin Plan:  New Dosin/2 level 7.8, give dose today; continue 1000mg IV daily PRN when vanco level <15  Next Level: 12/3 AM draw  Renal Function Monitoring: Daily BMP and [place] will continue to follow closely for s/sx of nephrotoxicity, infusion reactions and appropriateness of therapy. BMP and CBC will be ordered per protocol. We will continue to follow the patient’s culture results and clinical progress daily.     Junaid Stevens, Pharmacist

## 2023-12-02 NOTE — ASSESSMENT & PLAN NOTE
CT of chest, abdomen and pelvis reveals  multi lobar pneumonia. Severe upper lobe predominant pulmonary emphysema. No noted elevated procalcitonin  Leukocytosis, procalcitonin not downtrending. Follow-up with nasal MRSA swab, sputum Gram stain and culture. Transition to IV ceftriaxone. Discontinue Comycin once nasal MRSA negative. Follow-up urine strep, Legionella antigen. Continue supportive care.

## 2023-12-02 NOTE — ASSESSMENT & PLAN NOTE
Patient meets sepsis criteria, WBC 21.28, lactic 2.8, Cr 4.18,  hypotension, afebrile,   CT of chest, abdomen and pelvis reveals  multi lobar pneumonia and severe upper lobe predominant pulmonary emphysema. CT of head shows no acute intracranial abnormality. Chronic microangiopathic changes and pansinus mucosal disease. Noted patient is hypotensive in ED, two boluses of NS administrated. Patient responded to fluids but became hypotensive again, 2 bolus of LR ordered, effective in ED.   RSV positive in respiratory panel   COVID-19/flu negative. Leukocytosis, procalcitonin improving. Had received 1 dose of Zosyn, 3 doses of cefepime. Cardiomegaly patient appears comfortable and not in distress. Chronically nontoxic-appearing. Saturation 95% on room air. Noted with hypotension however clinically asymptomatic and acutely nontoxic appearing. Will try gentle IV fluid and IV albumin with caution due to history of CHF with low EF. Follow-up with urine Legionella, strep pneumo antigen. Will transition to IV ceftriaxone,  Discontinue vancomycin once nasal MRSA swab negative. Continue with supportive care.

## 2023-12-02 NOTE — PLAN OF CARE
Problem: SAFETY ADULT  Goal: Patient will remain free of falls  Description: INTERVENTIONS:  - Educate patient/family on patient safety including physical limitations  - Instruct patient to call for assistance with activity   - Consult OT/PT to assist with strengthening/mobility   - Keep Call bell within reach  - Keep bed low and locked with side rails adjusted as appropriate  - Keep care items and personal belongings within reach  - Initiate and maintain comfort rounds  - Make Fall Risk Sign visible to staff  - Apply yellow socks and bracelet for high fall risk patients  - Consider moving patient to room near nurses station  Outcome: Progressing     Problem: INFECTION - ADULT  Goal: Absence or prevention of progression during hospitalization  Description: INTERVENTIONS:  - Assess and monitor for signs and symptoms of infection  - Monitor lab/diagnostic results  - Monitor all insertion sites, i.e. indwelling lines, tubes, and drains  - Monitor endotracheal if appropriate and nasal secretions for changes in amount and color  - [place] appropriate cooling/warming therapies per order  - Administer medications as ordered  - Instruct and encourage patient and family to use good hand hygiene technique  - Identify and instruct in appropriate isolation precautions for identified infection/condition  Outcome: Progressing

## 2023-12-02 NOTE — ASSESSMENT & PLAN NOTE
Per sister at bedside the patient kidney function has been normal  BUN 56, Cr. 4.18 likely to sepsis  Improving currently 1.36  Currently off of IV fluids due to history of CHF with EF of 15 to 20%  Encourage adequate p.o. intake. Monitor BMP. Avoid nephrotoxic agents.

## 2023-12-02 NOTE — UTILIZATION REVIEW
Date: 12/2    Day 3: Has surpassed a 2nd midnight with active treatments and services, which include IV abx , monitor I&O , O2 sat , labs .

## 2023-12-02 NOTE — PROGRESS NOTES
[address]  Progress Note  Name: Zackery Conrad I  MRN: [id number]  Unit/Bed#: -01 I Date of Admission: 11/30/2023   Date of Service: 12/2/2023 I Hospital Day: 2    Assessment/Plan   * Severe sepsis with acute organ dysfunction [facility])  Assessment & Plan  Patient meets sepsis criteria, WBC 21.28, lactic 2.8, Cr 4.18,  hypotension, afebrile,   CT of chest, abdomen and pelvis reveals  multi lobar pneumonia and severe upper lobe predominant pulmonary emphysema. CT of head shows no acute intracranial abnormality. Chronic microangiopathic changes and pansinus mucosal disease. Noted patient is hypotensive in ED, two boluses of NS administrated. Patient responded to fluids but became hypotensive again, 2 bolus of LR ordered, effective in ED.   RSV positive in respiratory panel   COVID-19/flu negative. Leukocytosis, procalcitonin improving. Had received 1 dose of Zosyn, 3 doses of cefepime. Cardiomegaly patient appears comfortable and not in distress. Chronically nontoxic-appearing. Saturation 95% on room air. Noted with hypotension however clinically asymptomatic and acutely nontoxic appearing. Will try gentle IV fluid and IV albumin with caution due to history of CHF with low EF. Follow-up with urine Legionella, strep pneumo antigen. Will transition to IV ceftriaxone,  Discontinue vancomycin once nasal MRSA swab negative. Continue with supportive care. PAD (peripheral artery disease) (HCC)  Assessment & Plan  History of CAD, PAD, CVA currently on aspirin, Eliquis, statin. Metabolic encephalopathy  Assessment & Plan  Patient does have underlying history of dementia. Presented with metabolic encephalopathy in settings of severe sepsis as evident by increased confusion superimposed on dementia requiring CT head. Now resolved after fluid resuscitation and IV antibiotics per  Currently is awake, alert, oriented x 3.   Sister at the bedside also reports patient is currently at baseline. Pneumonia of both upper lobes due to infectious organism  Assessment & Plan  CT of chest, abdomen and pelvis reveals  multi lobar pneumonia. Severe upper lobe predominant pulmonary emphysema. No noted elevated procalcitonin  Leukocytosis, procalcitonin not downtrending. Follow-up with nasal MRSA swab, sputum Gram stain and culture. Transition to IV ceftriaxone. Discontinue Comycin once nasal MRSA negative. Follow-up urine strep, Legionella antigen. Continue supportive care. Chronic systolic heart failure (HCC)  Assessment & Plan  Wt Readings from Last 3 Encounters:   12/02/23 84.4 kg (186 lb 1.1 oz)   08/29/23 87.1 kg (192 lb)   04/11/23 90.7 kg (200 lb)   Per sister report, patient has been living with heart failure for many years  Previous echo form care everywhere note with EF 15-20%, s/p ICD in place  Hold  Entresto for ANNETTA  HOLD lasix for ANNETTA  Fluid restriction on 1500 cc daily PO  Monitor daily weights and strict I&O's            RSV infection  Assessment & Plan  No shortness of breath reported  Found to be positive in the ED   CT chest report noted with pulmonary emphysema, multifocal pneumonia. Respiratory protocol ordered  02 sats 95 % on room air  Continue with supportive care. Acute kidney injury [facility])  Assessment & Plan  Per sister at bedside the patient kidney function has been normal  BUN 56, Cr. 4.18 likely to sepsis  Improving currently 1.36  Currently off of IV fluids due to history of CHF with EF of 15 to 20%  Encourage adequate p.o. intake. Monitor BMP. Avoid nephrotoxic agents. Cardiomyopathy [facility])  Assessment & Plan  Last cath in 2020 at an outside hospital which shows ischemic cardiomyopathy ejection fraction 15 to 20%. Patient has a defibrillator in place                VTE Pharmacologic Prophylaxis: VTE Score: 7 Moderate Risk (Score 3-4) - Pharmacological DVT Prophylaxis Ordered: apixaban (Eliquis).     Mobility:   Basic Mobility Inpatient Raw Score: 18  -HLM Goal: 6: Walk 10 steps or more  -HLM Achieved: 1: Laying in bed    Patient Centered Rounds: I performed bedside rounds with nursing staff today. Total Time Spent on Date of Encounter in care of patient: 25 mins. This time was spent on one or more of the following: performing physical exam; counseling and coordination of care; obtaining or reviewing history; documenting in the medical record; reviewing/ordering tests, medications or procedures; communicating with other healthcare professionals and discussing with patient's family/caregivers. Current Length of Stay: 2 day(s)  Current Patient Status: Inpatient   Certification Statement: The patient will continue to require additional inpatient hospital stay due to severe sepsis secondary to RSV infection as well as bacterial pneumonia  Discharge Plan: Anticipate discharge in 48 hrs to discharge location to be determined pending rehab evaluations. Code Status: Level 1 - Full Code    Subjective:   Seen during a.m. rounds. Patient appears comfortable on discharge. Seen sitting on chair. Overall patient reports feeling much better and eager to go home. Denies any new complaint. No other events reported. Objective:     Vitals:   Temp (24hrs), Av °F (36.7 °C), Min:97.2 °F (36.2 °C), Max:98.3 °F (36.8 °C)    Temp:  [97.2 °F (36.2 °C)-98.3 °F (36.8 °C)] 98.3 °F (36.8 °C)  HR:  [68-95] 68  Resp:  [15-18] 18  BP: ()/(51-62) 110/58  SpO2:  [90 %-95 %] 90 %  Body mass index is 25.24 kg/m². Input and Output Summary (last 24 hours): Intake/Output Summary (Last 24 hours) at 2023 1350  Last data filed at 2023 0849  Gross per 24 hour   Intake --   Output 300 ml   Net -300 ml       Physical Exam:   Physical Exam  Constitutional:       General: He is not in acute distress. Appearance: Normal appearance. He is not ill-appearing, toxic-appearing or diaphoretic.    HENT:      Head: Normocephalic and atraumatic. Eyes:      Pupils: Pupils are equal, round, and reactive to light. Cardiovascular:      Rate and Rhythm: Normal rate. Pulses: Normal pulses. Pulmonary:      Effort: Pulmonary effort is normal. No respiratory distress. Breath sounds: No wheezing or rhonchi. Abdominal:      General: Bowel sounds are normal. There is no distension. Palpations: Abdomen is soft. Tenderness: There is no abdominal tenderness. Musculoskeletal:      Cervical back: No rigidity. Right lower leg: No edema. Left lower leg: No edema. Neurological:      Mental Status: He is alert and oriented to person, place, and time. Psychiatric:         Mood and Affect: Mood normal.         Behavior: Behavior normal.          Additional Data:     Labs:  Results from last 7 days   Lab Units 12/02/23 0451 12/01/23 0458   WBC Thousand/uL 5.82 13.68*   HEMOGLOBIN g/dL 10.6* 10.7*   HEMATOCRIT % 34.5* 35.0*   PLATELETS Thousands/uL 111* 112*   NEUTROS PCT %  --  85*   LYMPHS PCT %  --  6*   MONOS PCT %  --  8   EOS PCT %  --  0     Results from last 7 days   Lab Units 12/02/23 0451 12/01/23 0458 11/30/23  1746   SODIUM mmol/L 139   < > 136   POTASSIUM mmol/L 3.7   < > 5.0   CHLORIDE mmol/L 111*   < > 104   CO2 mmol/L 22   < > 19*   BUN mg/dL 24   < > 56*   CREATININE mg/dL 1.36*   < > 4.18*   ANION GAP mmol/L 6   < > 13   CALCIUM mg/dL 8.0*   < > 9.1   ALBUMIN g/dL  --   --  4.0   TOTAL BILIRUBIN mg/dL  --   --  0.68   ALK PHOS U/L  --   --  87   ALT U/L  --   --  12   AST U/L  --   --  22   GLUCOSE RANDOM mg/dL 110   < > 108    < > = values in this interval not displayed.      Results from last 7 days   Lab Units 11/30/23  1925   INR  2.09*             Results from last 7 days   Lab Units 12/01/23 0458 11/30/23 2058 11/30/23 2052 11/30/23  1746   LACTIC ACID mmol/L  --   --  1.5 2.8*   PROCALCITONIN ng/ml 15.75* 27.48*  --   --        Lines/Drains:  Invasive Devices       Peripheral Intravenous Line Duration             Peripheral IV 12/02/23 Distal;Right;Upper;Ventral (anterior) Arm <1 day                          Recent Cultures (last 7 days):   Results from last 7 days   Lab Units 12/01/23  0936 11/30/23  2301 11/30/23 1930 11/30/23  1925   BLOOD CULTURE   --   --  No Growth at 24 hrs. No Growth at 24 hrs. SPUTUM CULTURE  Culture too young- will reincubate  --   --   --    GRAM STAIN RESULT  1+ Epithelial cells per low power field*  Rare Disintegrating polys*  1+ Gram positive cocci in pairs and chains*  1+ Budding yeast*  Rare Gram negative rods*  --   --   --    C DIFF TOXIN B BY PCR   --  Negative  --   --        Last 24 Hours Medication List:   Current Facility-Administered Medications   Medication Dose Route Frequency Provider Last Rate    acetaminophen  650 mg Oral Q6H PRN GRETEL Muñiz      apixaban  5 mg Oral Daily GRETEL Muñiz      aspirin  81 mg Oral Daily GRETEL Muñiz      atorvastatin  80 mg Oral Daily With Jace SirenaGRETEL Bliss      carvedilol  25 mg Oral BID With Meals GRETEL Muñiz      cefTRIAXone  1,000 mg Intravenous Q24H Evgeny JACOBSEN MD 1,000 mg (12/02/23 0957)    docusate sodium  100 mg Oral BID GRETEL Muñiz      mirtazapine  30 mg Oral HS GRETEL Muñiz      ondansetron  4 mg Intravenous Q6H PRN RGETEL Muñiz      temazepam  30 mg Oral HS PRN GRETEL Muñiz      vancomycin  10 mg/kg Intravenous Daily PRN GRETEL Faust          Today, Patient Was Seen By: Aracely Boudreaux MD    **Please Note: This note may have been constructed using a voice recognition system. **

## 2023-12-02 NOTE — ASSESSMENT & PLAN NOTE
Wt Readings from Last 3 Encounters:   12/02/23 84.4 kg (186 lb 1.1 oz)   08/29/23 87.1 kg (192 lb)   04/11/23 90.7 kg (200 lb)   Per sister report, patient has been living with heart failure for many years  Previous echo form care everywhere note with EF 15-20%, s/p ICD in place  Hold  Entresto for ANNETTA  HOLD lasix for ANNETTA  Fluid restriction on 1500 cc daily PO  Monitor daily weights and strict I&O's

## 2023-12-03 LAB
ANION GAP SERPL CALCULATED.3IONS-SCNC: 7 MMOL/L
BACTERIA SPT RESP CULT: ABNORMAL
BUN SERPL-MCNC: 13 MG/DL (ref 5–25)
CALCIUM SERPL-MCNC: 7.9 MG/DL (ref 8.4–10.2)
CHLORIDE SERPL-SCNC: 109 MMOL/L (ref 96–108)
CO2 SERPL-SCNC: 23 MMOL/L (ref 21–32)
CREAT SERPL-MCNC: 1.08 MG/DL (ref 0.6–1.3)
ERYTHROCYTE [DISTWIDTH] IN BLOOD BY AUTOMATED COUNT: 18.5 % (ref 11.6–15.1)
GFR SERPL CREATININE-BSD FRML MDRD: 70 ML/MIN/1.73SQ M
GLUCOSE SERPL-MCNC: 91 MG/DL (ref 65–140)
GRAM STN SPEC: ABNORMAL
HCT VFR BLD AUTO: 32.7 % (ref 36.5–49.3)
HGB BLD-MCNC: 10.2 G/DL (ref 12–17)
L PNEUMO1 AG UR QL IA.RAPID: NEGATIVE
MCH RBC QN AUTO: 25.1 PG (ref 26.8–34.3)
MCHC RBC AUTO-ENTMCNC: 31.2 G/DL (ref 31.4–37.4)
MCV RBC AUTO: 81 FL (ref 82–98)
PLATELET # BLD AUTO: 112 THOUSANDS/UL (ref 149–390)
PMV BLD AUTO: 11.9 FL (ref 8.9–12.7)
POTASSIUM SERPL-SCNC: 3.6 MMOL/L (ref 3.5–5.3)
PROCALCITONIN SERPL-MCNC: 2.38 NG/ML
RBC # BLD AUTO: 4.06 MILLION/UL (ref 3.88–5.62)
S PNEUM AG UR QL: NEGATIVE
SODIUM SERPL-SCNC: 139 MMOL/L (ref 135–147)
WBC # BLD AUTO: 4.9 THOUSAND/UL (ref 4.31–10.16)

## 2023-12-03 PROCEDURE — 85027 COMPLETE CBC AUTOMATED: CPT | Performed by: STUDENT IN AN ORGANIZED HEALTH CARE EDUCATION/TRAINING PROGRAM

## 2023-12-03 PROCEDURE — 80048 BASIC METABOLIC PNL TOTAL CA: CPT | Performed by: STUDENT IN AN ORGANIZED HEALTH CARE EDUCATION/TRAINING PROGRAM

## 2023-12-03 PROCEDURE — 99232 SBSQ HOSP IP/OBS MODERATE 35: CPT | Performed by: STUDENT IN AN ORGANIZED HEALTH CARE EDUCATION/TRAINING PROGRAM

## 2023-12-03 PROCEDURE — 84145 PROCALCITONIN (PCT): CPT | Performed by: STUDENT IN AN ORGANIZED HEALTH CARE EDUCATION/TRAINING PROGRAM

## 2023-12-03 RX ORDER — FUROSEMIDE 40 MG/1
40 TABLET ORAL DAILY
Status: DISCONTINUED | OUTPATIENT
Start: 2023-12-03 | End: 2023-12-04 | Stop reason: HOSPADM

## 2023-12-03 RX ORDER — CEFDINIR 300 MG/1
300 CAPSULE ORAL EVERY 12 HOURS SCHEDULED
Status: DISCONTINUED | OUTPATIENT
Start: 2023-12-03 | End: 2023-12-04 | Stop reason: HOSPADM

## 2023-12-03 RX ADMIN — APIXABAN 5 MG: 5 TABLET, FILM COATED ORAL at 20:03

## 2023-12-03 RX ADMIN — CEFDINIR 300 MG: 300 CAPSULE ORAL at 20:03

## 2023-12-03 RX ADMIN — CARVEDILOL 25 MG: 12.5 TABLET, FILM COATED ORAL at 15:49

## 2023-12-03 RX ADMIN — TEMAZEPAM 30 MG: 15 CAPSULE ORAL at 20:03

## 2023-12-03 RX ADMIN — ASPIRIN 81 MG: 81 TABLET, COATED ORAL at 08:40

## 2023-12-03 RX ADMIN — FUROSEMIDE 40 MG: 40 TABLET ORAL at 13:48

## 2023-12-03 RX ADMIN — CARVEDILOL 25 MG: 12.5 TABLET, FILM COATED ORAL at 08:40

## 2023-12-03 RX ADMIN — MIRTAZAPINE 30 MG: 15 TABLET, FILM COATED ORAL at 20:03

## 2023-12-03 RX ADMIN — ATORVASTATIN CALCIUM 80 MG: 40 TABLET, FILM COATED ORAL at 15:49

## 2023-12-03 RX ADMIN — CEFDINIR 300 MG: 300 CAPSULE ORAL at 08:43

## 2023-12-03 NOTE — ASSESSMENT & PLAN NOTE
Wt Readings from Last 3 Encounters:   12/03/23 84 kg (185 lb 3 oz)   08/29/23 87.1 kg (192 lb)   04/11/23 90.7 kg (200 lb)   Per sister report, patient has been living with heart failure for many years  Previous echo form care everywhere note with EF 15-20%, s/p ICD in place  Currently appears euvolemic. Resume home dose of Lasix, Entresto starting tomorrow.   Fluid restriction on 1500 cc daily PO  Monitor daily weights and strict I&O's

## 2023-12-03 NOTE — PROGRESS NOTES
[address]  Progress Note  Name: Hallie Munroe I  MRN: [id number]  Unit/Bed#: -01 I Date of Admission: 11/30/2023   Date of Service: 12/3/2023 I Hospital Day: 3    Assessment/Plan   * Severe sepsis with acute organ dysfunction [facility])  Assessment & Plan  Patient meets sepsis criteria, WBC 21.28, lactic 2.8, Cr 4.18,  hypotension, afebrile,   CT of chest, abdomen and pelvis reveals  multi lobar pneumonia and severe upper lobe predominant pulmonary emphysema. CT of head shows no acute intracranial abnormality. Chronic microangiopathic changes and pansinus mucosal disease. Noted patient is hypotensive in ED, two boluses of NS administrated. Patient responded to fluids but became hypotensive again, 2 bolus of LR ordered, effective in ED.   RSV positive in respiratory panel   COVID-19/flu negative. Had received 1 dose of Zosyn, 3 doses of cefepime. Leukocytosis now resolved  Procalcitonin significantly improved. Clinically acutely nontoxic appearing and hemodynamically stable. Nasal MRSA swab negative. Sputum culture growing mixed respiratory kit  Urine Legionella, strep antigen negative. 2/2 blood culture without growth in 48 hours. Completed 3 days of IV antibiotics. Transition to [address] for 2 more days for 5-day course. Patient is hemodynamically stable for discharge today however does not feel comfortable going home today and will be discharged tomorrow. Sister at the bedside agreeable to plan. PAD (peripheral artery disease) ([facility])  Assessment & Plan  History of CAD, PAD, CVA currently on aspirin, Eliquis, statin. Metabolic encephalopathy  Assessment & Plan  Patient does have underlying history of dementia. Presented with metabolic encephalopathy in settings of severe sepsis as evident by increased confusion superimposed on dementia requiring CT head.     Now resolved after fluid resuscitation and IV antibiotics per  Currently is awake, alert, oriented x 3. Sister at the bedside also reports patient is currently at baseline. Pneumonia of both upper lobes due to infectious organism  Assessment & Plan  CT of chest, abdomen and pelvis reveals  multi lobar pneumonia. Severe upper lobe predominant pulmonary emphysema. No noted elevated procalcitonin  Leukocytosis now resolved  Procalcitonin significantly improved. Nasal MRSA swab negative. Sputum culture growing mixed respiratory kit  Urine Legionella, strep antigen negative. Completed 3 days of antibiotics for  Transition to p.o. [address] [place] [address] for 2 more days for 5-day course. Chronic systolic heart failure (HCC)  Assessment & Plan  Wt Readings from Last 3 Encounters:   12/03/23 84 kg (185 lb 3 oz)   08/29/23 87.1 kg (192 lb)   04/11/23 90.7 kg (200 lb)   Per sister report, patient has been living with heart failure for many years  Previous echo form care everywhere note with EF 15-20%, s/p ICD in place  Currently appears euvolemic. Resume home dose of Lasix, Entresto starting tomorrow. Fluid restriction on 1500 cc daily PO  Monitor daily weights and strict I&O's            RSV infection  Assessment & Plan  No shortness of breath reported  Found to be positive in the ED   CT chest report noted with pulmonary emphysema, multifocal pneumonia. Respiratory protocol ordered  02 sats 95 % on room air  Continue with supportive care. Acute kidney injury [facility])  Assessment & Plan  Per sister at bedside the patient kidney function has been normal  BUN 56, Cr. 4.18 likely to sepsis  Improving currently 1.08  Currently off of IV fluids due to history of CHF with EF of 15 to 20%  Encourage adequate p.o. intake. Monitor BMP. Avoid nephrotoxic agents. Cardiomyopathy [facility])  Assessment & Plan  Last cath in 2020 at an outside hospital which shows ischemic cardiomyopathy ejection fraction 15 to 20%.     Patient has a defibrillator in place                VTE Pharmacologic Prophylaxis: VTE Score: 7 Moderate Risk (Score 3-4) - Pharmacological DVT Prophylaxis Ordered: apixaban (Eliquis). Mobility:   Basic Mobility Inpatient Raw Score: 18  JH-HLM Goal: 6: Walk 10 steps or more  JH-HLM Achieved: 6: Walk 10 steps or more      Patient Centered Rounds: I performed bedside rounds with nursing staff today. Education and Discussions with Family / Patient: Updated  (sister) at bedside. Total Time Spent on Date of Encounter in care of patient: 25 mins. This time was spent on one or more of the following: performing physical exam; counseling and coordination of care; obtaining or reviewing history; documenting in the medical record; reviewing/ordering tests, medications or procedures; communicating with other healthcare professionals and discussing with patient's family/caregivers. Current Length of Stay: 3 day(s)  Current Patient Status: Inpatient   Certification Statement: The patient will continue to require additional inpatient hospital stay due to patient is stable for discharge however sister at the bedside requesting discharge tomorrow so she can prepare his house and aid/VNA to come on Monday. Discharge Plan: Anticipate discharge tomorrow to home with home services. Code Status: Level 1 - Full Code    Subjective:   Appears comfortable nondistressed. Patient currently denies chest pain, dyspnea, fever, chills, nausea, vomiting, abdominal pain, diarrhea, any other new complaints. No other events reported. Sister at the bedside reports to discharge him tomorrow so she can prepare his house as well as [employer] aide/vna services to come see him on Monday. No other events reported. Objective:     Vitals:   Temp (24hrs), Av.6 °F (36.4 °C), Min:97.1 °F (36.2 °C), Max:98.2 °F (36.8 °C)    Temp:  [97.1 °F (36.2 °C)-98.2 °F (36.8 °C)] 98.2 °F (36.8 °C)  HR:  [58-64] 58  Resp:  [16-18] 16  BP: (108-117)/(58-62) 117/62  SpO2:  [90 %-96 %] 96 %  Body mass index is 25.12 kg/m².      Input and Output Summary (last 24 hours): Intake/Output Summary (Last 24 hours) at 12/3/2023 1253  Last data filed at 12/3/2023 0818  Gross per 24 hour   Intake 400 ml   Output 500 ml   Net -100 ml       Physical Exam:   Physical Exam  Constitutional:       General: He is not in acute distress. Appearance: Normal appearance. He is not ill-appearing, toxic-appearing or diaphoretic. HENT:      Head: Normocephalic and atraumatic. Eyes:      Pupils: Pupils are equal, round, and reactive to light. Cardiovascular:      Rate and Rhythm: Normal rate. Pulses: Normal pulses. Pulmonary:      Effort: Pulmonary effort is normal. No respiratory distress. Breath sounds: No wheezing or rhonchi. Abdominal:      General: Bowel sounds are normal. There is no distension. Palpations: Abdomen is soft. Tenderness: There is no abdominal tenderness. Musculoskeletal:      Cervical back: No rigidity. Right lower leg: No edema. Left lower leg: No edema. Neurological:      Mental Status: He is alert and oriented to person, place, and time. Psychiatric:         Mood and Affect: Mood normal.         Behavior: Behavior normal.          Additional Data:     Labs:  Results from last 7 days   Lab Units 12/03/23  0556 12/02/23  0451 12/01/23 0458   WBC Thousand/uL 4.90   < > 13.68*   HEMOGLOBIN g/dL 10.2*   < > 10.7*   HEMATOCRIT % 32.7*   < > 35.0*   PLATELETS Thousands/uL 112*   < > 112*   NEUTROS PCT %  --   --  85*   LYMPHS PCT %  --   --  6*   MONOS PCT %  --   --  8   EOS PCT %  --   --  0    < > = values in this interval not displayed.      Results from last 7 days   Lab Units 12/03/23  0556 12/01/23 0458 11/30/23  1746   SODIUM mmol/L 139   < > 136   POTASSIUM mmol/L 3.6   < > 5.0   CHLORIDE mmol/L 109*   < > 104   CO2 mmol/L 23   < > 19*   BUN mg/dL 13   < > 56*   CREATININE mg/dL 1.08   < > 4.18*   ANION GAP mmol/L 7   < > 13   CALCIUM mg/dL 7.9*   < > 9.1   ALBUMIN g/dL  --   --  4.0   TOTAL BILIRUBIN mg/dL  --   --  0.68   ALK PHOS U/L  --   --  87   ALT U/L  --   --  12   AST U/L  --   --  22   GLUCOSE RANDOM mg/dL 91   < > 108    < > = values in this interval not displayed. Results from last 7 days   Lab Units 11/30/23 1925   INR  2.09*             Results from last 7 days   Lab Units 12/03/23  0556 12/01/23  0458 11/30/23 2058 11/30/23 2052 11/30/23  1746   LACTIC ACID mmol/L  --   --   --  1.5 2.8*   PROCALCITONIN ng/ml 2.38* 15.75* 27.48*  --   --        Lines/Drains:  Invasive Devices       Peripheral Intravenous Line  Duration             Peripheral IV 12/02/23 Distal;Right;Upper;Ventral (anterior) Arm 1 day                        Recent Cultures (last 7 days):   Results from last 7 days   Lab Units 12/02/23  1652 12/01/23  0936 11/30/23  2301 11/30/23  1930 11/30/23 1925   BLOOD CULTURE   --   --   --  No Growth at 48 hrs. No Growth at 48 hrs. SPUTUM CULTURE   --  1+ Growth of Candida tropicalis*  1+ Growth of  Commensal respiratory kit only; No significant growth of Staph aureus/MRSA or Pseudomonas aeruginosa.   --   --   --    GRAM STAIN RESULT   --  1+ Epithelial cells per low power field*  Rare Disintegrating polys*  1+ Gram positive cocci in pairs and chains*  1+ Budding yeast*  Rare Gram negative rods*  --   --   --    LEGIONELLA URINARY ANTIGEN  Negative  --   --   --   --    C DIFF TOXIN B BY PCR   --   --  Negative  --   --        Last 24 Hours Medication List:   Current Facility-Administered Medications   Medication Dose Route Frequency Provider Last Rate    acetaminophen  650 mg Oral Q6H PRN GRETEL Muñiz      apixaban  5 mg Oral Daily GRETEL Muñiz      aspirin  81 mg Oral Daily GRETEL Muñiz      atorvastatin  80 mg Oral Daily With Jace SirenaGRETEL Bliss      carvedilol  25 mg Oral BID With Meals GRETEL Muñiz      cefdinir  300 mg Oral Q12H [address] Evgeny JACOBSEN MD      docusate sodium  100 mg Oral BID GRETEL Muñiz      furosemide  40 mg Oral Daily Evgeny JACOBSEN MD      mirtazapine  30 mg Oral HS GRETEL Muñiz      ondansetron  4 mg Intravenous Q6H PRN GRETEL Muñiz      [START ON 12/4/2023] sacubitril-valsartan  1 tablet Oral BID Evgeny JACOBSEN MD      temazepam  30 mg Oral HS PRN GRETEL Waller          Today, Patient Was Seen By: Martín Watters MD    **Please Note: This note may have been constructed using a voice recognition system. **

## 2023-12-03 NOTE — CONSULTS
Vancomycin IV Pharmacy-to-Dose Consultation    Noni Barry is a 76 y.o. male who was receiving Vancomycin IV with management by the Pharmacy Consult service for treatment of Pneumonia (goal -600, trough >10). The patient's Vancomycin therapy has been completed / discontinued. Thank you for allowing us to take part in this patient's care. Pharmacy will sign-off now; please call or re-consult if there are any questions.       Mihaela Blanchard, Pharmacist

## 2023-12-03 NOTE — ASSESSMENT & PLAN NOTE
CT of chest, abdomen and pelvis reveals  multi lobar pneumonia. Severe upper lobe predominant pulmonary emphysema. No noted elevated procalcitonin  Leukocytosis now resolved  Procalcitonin significantly improved. Nasal MRSA swab negative. Sputum culture growing mixed respiratory kit  Urine Legionella, strep antigen negative. Completed 3 days of antibiotics for  Transition to [address] for 2 more days for 5-day course.

## 2023-12-03 NOTE — PLAN OF CARE
Problem: INFECTION - ADULT  Goal: Absence or prevention of progression during hospitalization  Description: INTERVENTIONS:  - Assess and monitor for signs and symptoms of infection  - Monitor lab/diagnostic results  - Monitor all insertion sites, i.e. indwelling lines, tubes, and drains  - Monitor endotracheal if appropriate and nasal secretions for changes in amount and color  - [place] appropriate cooling/warming therapies per order  - Administer medications as ordered  - Instruct and encourage patient and family to use good hand hygiene technique  - Identify and instruct in appropriate isolation precautions for identified infection/condition  Outcome: Progressing

## 2023-12-03 NOTE — ASSESSMENT & PLAN NOTE
Patient meets sepsis criteria, WBC 21.28, lactic 2.8, Cr 4.18,  hypotension, afebrile,   CT of chest, abdomen and pelvis reveals  multi lobar pneumonia and severe upper lobe predominant pulmonary emphysema. CT of head shows no acute intracranial abnormality. Chronic microangiopathic changes and pansinus mucosal disease. Noted patient is hypotensive in ED, two boluses of NS administrated. Patient responded to fluids but became hypotensive again, 2 bolus of LR ordered, effective in ED.   RSV positive in respiratory panel   COVID-19/flu negative. Had received 1 dose of Zosyn, 3 doses of cefepime. Leukocytosis now resolved  Procalcitonin significantly improved. Clinically acutely nontoxic appearing and hemodynamically stable. Nasal MRSA swab negative. Sputum culture growing mixed respiratory kit  Urine Legionella, strep antigen negative. 2/2 blood culture without growth in 48 hours. Completed 3 days of IV antibiotics. Transition to [address] for 2 more days for 5-day course. Patient is hemodynamically stable for discharge today however does not feel comfortable going home today and will be discharged tomorrow. Sister at the bedside agreeable to plan.

## 2023-12-03 NOTE — ASSESSMENT & PLAN NOTE
Per sister at bedside the patient kidney function has been normal  BUN 56, Cr. 4.18 likely to sepsis  Improving currently 1.08  Currently off of IV fluids due to history of CHF with EF of 15 to 20%  Encourage adequate p.o. intake. Monitor BMP. Avoid nephrotoxic agents.

## 2023-12-04 ENCOUNTER — APPOINTMENT (INPATIENT)
Dept: NON INVASIVE DIAGNOSTICS | Facility: HOSPITAL | Age: 68
DRG: 871 | End: 2023-12-04
Payer: COMMERCIAL

## 2023-12-04 VITALS
OXYGEN SATURATION: 89 % | SYSTOLIC BLOOD PRESSURE: 113 MMHG | TEMPERATURE: 97.4 F | WEIGHT: 184 LBS | RESPIRATION RATE: 16 BRPM | DIASTOLIC BLOOD PRESSURE: 60 MMHG | HEART RATE: 60 BPM | BODY MASS INDEX: 24.92 KG/M2 | HEIGHT: 72 IN

## 2023-12-04 LAB
AORTIC ROOT: 4.1 CM
APICAL FOUR CHAMBER EJECTION FRACTION: 61 %
ASCENDING AORTA: 4.2 CM
E WAVE DECELERATION TIME: 248 MS
E/A RATIO: 1.09
FRACTIONAL SHORTENING: 46 (ref 28–44)
INTERVENTRICULAR SEPTUM IN DIASTOLE (PARASTERNAL SHORT AXIS VIEW): 1.3 CM
INTERVENTRICULAR SEPTUM: 1.3 CM (ref 0.6–1.1)
LAAS-AP2: 21.1 CM2
LAAS-AP4: 18.8 CM2
LEFT ATRIUM AREA SYSTOLE SINGLE PLANE A4C: 18.7 CM2
LEFT ATRIUM SIZE: 4.9 CM
LEFT ATRIUM VOLUME (MOD BIPLANE): 53 ML
LEFT ATRIUM VOLUME INDEX (MOD BIPLANE): 25.7 ML/M2
LEFT INTERNAL DIMENSION IN SYSTOLE: 2.5 CM (ref 2.1–4)
LEFT VENTRICULAR INTERNAL DIMENSION IN DIASTOLE: 4.6 CM (ref 3.5–6)
LEFT VENTRICULAR POSTERIOR WALL IN END DIASTOLE: 1.2 CM
LEFT VENTRICULAR STROKE VOLUME: 76 ML
LVSV (TEICH): 76 ML
MV E'TISSUE VEL-SEP: 6 CM/S
MV PEAK A VEL: 0.58 M/S
MV PEAK E VEL: 63 CM/S
MV STENOSIS PRESSURE HALF TIME: 72 MS
MV VALVE AREA P 1/2 METHOD: 3.06
RIGHT ATRIUM AREA SYSTOLE A4C: 19.3 CM2
RIGHT VENTRICLE ID DIMENSION: 2.8 CM
SL CV LEFT ATRIUM LENGTH A2C: 6.5 CM
SL CV LV EF: 60
SL CV PED ECHO LEFT VENTRICLE DIASTOLIC VOLUME (MOD BIPLANE) 2D: 99 ML
SL CV PED ECHO LEFT VENTRICLE SYSTOLIC VOLUME (MOD BIPLANE) 2D: 23 ML
TR MAX PG: 27 MMHG
TR PEAK VELOCITY: 2.6 M/S
TRICUSPID ANNULAR PLANE SYSTOLIC EXCURSION: 2 CM
TRICUSPID VALVE PEAK REGURGITATION VELOCITY: 2.58 M/S

## 2023-12-04 PROCEDURE — 93306 TTE W/DOPPLER COMPLETE: CPT | Performed by: INTERNAL MEDICINE

## 2023-12-04 PROCEDURE — 99239 HOSP IP/OBS DSCHRG MGMT >30: CPT | Performed by: STUDENT IN AN ORGANIZED HEALTH CARE EDUCATION/TRAINING PROGRAM

## 2023-12-04 PROCEDURE — 93306 TTE W/DOPPLER COMPLETE: CPT

## 2023-12-04 RX ADMIN — ASPIRIN 81 MG: 81 TABLET, COATED ORAL at 09:10

## 2023-12-04 RX ADMIN — CARVEDILOL 25 MG: 12.5 TABLET, FILM COATED ORAL at 09:21

## 2023-12-04 RX ADMIN — SACUBITRIL AND VALSARTAN 1 TABLET: 49; 51 TABLET, FILM COATED ORAL at 09:45

## 2023-12-04 RX ADMIN — CEFDINIR 300 MG: 300 CAPSULE ORAL at 09:10

## 2023-12-04 RX ADMIN — FUROSEMIDE 40 MG: 40 TABLET ORAL at 09:10

## 2023-12-04 NOTE — CASE MANAGEMENT
Case Management Discharge Planning Note    Patient name Hallie Munroe  Location /-09 MRN [id number]  : 1955 Date 2023       Current Admission Date: 2023  Current Admission Diagnosis:Severe sepsis with acute organ dysfunction [facility])   Patient Active Problem List    Diagnosis Date Noted    Metabolic encephalopathy     PAD (peripheral artery disease) ([address]) 2023    Severe sepsis with acute organ dysfunction ([address]) 2023    Acute kidney injury ([address]) 2023    RSV infection 2023    Chronic systolic heart failure ([address]) 2023    Pneumonia of both upper lobes due to infectious organism 2023    Status post endovascular aneurysm repair (EVAR) 2023    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 ([address]) 2023    Status post femorofemoral bypass surgery 2023    Cardiomyopathy ([address]) 2023    Hypertension 2023    Bilateral leg edema 2023      LOS (days): 4  Geometric Mean LOS (GMLOS) (days): 5.10  Days to GMLOS:1.4     OBJECTIVE:  Risk of Unplanned Readmission Score: 12.23         Current admission status: Inpatient   Preferred Pharmacy:   [facility] - [address]  Phone: [phone] Fax: [phone]    Primary Care Provider: Mercedes Grimm MD    Primary Insurance: [facility]  Secondary Insurance:     DISCHARGE DETAILS:                                                                                        IMM Given (Date):: 23  IMM Given to[de-identified] Family  Family notified[de-identified] Kenneth Miller  Additional Comments: [facility] and [facility] HOSPITAL rights reviewed with sister over phone.  Verbalized understanding and signd original.

## 2023-12-04 NOTE — ASSESSMENT & PLAN NOTE
CT of chest, abdomen and pelvis reveals  multi lobar pneumonia. Severe upper lobe predominant pulmonary emphysema. No noted elevated procalcitonin  Leukocytosis now resolved  Procalcitonin significantly improved. Nasal MRSA swab negative. Sputum culture growing mixed respiratory kit  Urine Legionella, strep antigen negative. Completed 3 days of antibiotics for  Transition to [address] for 2 more days for 5-day course.

## 2023-12-04 NOTE — ASSESSMENT & PLAN NOTE
Patient meets sepsis criteria, WBC 21.28, lactic 2.8, Cr 4.18,  hypotension, afebrile,   CT of chest, abdomen and pelvis reveals  multi lobar pneumonia and severe upper lobe predominant pulmonary emphysema. CT of head shows no acute intracranial abnormality. Chronic microangiopathic changes and pansinus mucosal disease. Noted patient is hypotensive in ED, two boluses of NS administrated. Patient responded to fluids but became hypotensive again, 2 bolus of LR ordered, effective in ED.   RSV positive in respiratory panel   COVID-19/flu negative. Had received 1 dose of Zosyn, 3 doses of cefepime. Leukocytosis now resolved  Procalcitonin significantly improved. Clinically acutely nontoxic appearing and hemodynamically stable. Nasal MRSA swab negative. Sputum culture growing mixed respiratory kit  Urine Legionella, strep antigen negative. 2/2 blood culture without growth in 48 hours. Completed 3 days of IV antibiotics. Transition to [address] for 2 more days for 5-day course. Patient is hemodynamically stable for discharge today however does not feel comfortable going home today and will be discharged tomorrow. Sister at the bedside agreeable to plan.

## 2023-12-04 NOTE — ASSESSMENT & PLAN NOTE
Wt Readings from Last 3 Encounters:   12/04/23 83.5 kg (184 lb)   08/29/23 87.1 kg (192 lb)   04/11/23 90.7 kg (200 lb)   Per sister report, patient has been living with heart failure for many years  Previous echo form care everywhere note with EF 15-20%, s/p ICD in place  Currently appears euvolemic. Resume home dose of Lasix, Entresto starting tomorrow.   Fluid restriction on 1500 cc daily PO  Monitor daily weights and strict I&O's

## 2023-12-04 NOTE — PLAN OF CARE
Patient /  Spouse given discharge instructions for discharge, aware of f/u appointments. Hep lock removed. Belongings with patient. Ambulated to main lobby for discharge home, accompanied by wife.

## 2023-12-04 NOTE — DISCHARGE INSTR - AVS FIRST PAGE
Recommend consult with primary care provider in 3 to 5 days of discharge. Recommend to repeat CT chest in 6 to 8 weeks primary care provider to monitor evaluation of pneumonia.

## 2023-12-04 NOTE — DISCHARGE SUMMARY
[address]  Discharge- Emigdio Collier 1955, 76 y.o. male MRN: [id number]  Unit/Bed#: -01 Encounter: [id number]  Primary Care Provider: Brynda Felty, MD   Date and time admitted to hospital: 11/30/2023  5:17 PM    * Severe sepsis with acute organ dysfunction [facility])  Assessment & Plan  Patient meets sepsis criteria, WBC 21.28, lactic 2.8, Cr 4.18,  hypotension, afebrile,   CT of chest, abdomen and pelvis reveals  multi lobar pneumonia and severe upper lobe predominant pulmonary emphysema. CT of head shows no acute intracranial abnormality. Chronic microangiopathic changes and pansinus mucosal disease. Noted patient is hypotensive in ED, two boluses of NS administrated. Patient responded to fluids but became hypotensive again, 2 bolus of LR ordered, effective in ED.   RSV positive in respiratory panel   COVID-19/flu negative. Had received 1 dose of Zosyn, 3 doses of cefepime. Between IV Zosyn, cefepime and Omnicef patient has completed total 5-day course of antibiotics. Leukocytosis now resolved  Procalcitonin significantly improved. Clinically acutely nontoxic appearing and hemodynamically stable. Nasal MRSA swab negative. Sputum culture growing mixed respiratory kit  Urine Legionella, strep antigen negative. 2/2 blood culture without growth in 48 hours. Currently patient is afebrile, hemodynamically stable. Recommended to repeat CT chest in 6 to 8 weeks with primary care provider to monitor evaluation. Currently he is hemodynamically stable for discharge. PAD (peripheral artery disease) ([facility])  Assessment & Plan  History of CAD, PAD, CVA currently on aspirin, Eliquis, statin. Metabolic encephalopathy  Assessment & Plan  Patient does have underlying history of dementia. Presented with metabolic encephalopathy in settings of severe sepsis as evident by increased confusion superimposed on dementia requiring CT head.     Now resolved after fluid resuscitation and IV antibiotics per  Currently is awake, alert, oriented x 3. Sister at the bedside also reports patient is currently at baseline. Pneumonia of both upper lobes due to infectious organism  Assessment & Plan  CT of chest, abdomen and pelvis reveals  multi lobar pneumonia. Severe upper lobe predominant pulmonary emphysema. No noted elevated procalcitonin  Leukocytosis now resolved  Procalcitonin significantly improved. Nasal MRSA swab negative. Sputum culture growing mixed respiratory kit  Urine Legionella, strep antigen negative. Patient has completed 5 days of antibiotic course. Recommend repeat CT chest in 6 to 8 weeks with PCP to monitor the lesion. Not being discharged on antibiotics since completed 5-day course inpatient. Chronic systolic heart failure (HCC)  Assessment & Plan  Wt Readings from Last 3 Encounters:   12/04/23 83.5 kg (184 lb)   08/29/23 87.1 kg (192 lb)   04/11/23 90.7 kg (200 lb)   Per sister report, patient has been living with heart failure for many years  Previous echo form care everywhere note with EF 15-20%, s/p ICD in place  Currently appears euvolemic. Resume home dose of Lasix, Entresto. Fluid restriction on 1500 cc daily PO  Recommend outpatient follow-up with PCP. RSV infection  Assessment & Plan  No shortness of breath reported  Found to be positive in the ED   CT chest report noted with pulmonary emphysema, multifocal pneumonia. Respiratory protocol ordered  02 sats 95 % on room air  Continue with supportive care. Acute kidney injury [facility])  Assessment & Plan  Per sister at bedside the patient kidney function has been normal  BUN 56, Cr. 4.18 likely to sepsis  Improving currently 1.08  Currently off of IV fluids due to history of CHF with EF of 15 to 20%  Resume home dose of Entresto, Lasix. Recommend outpatient follow-up with PCP.     Cardiomyopathy [facility])  Assessment & Plan  Last cath in 2020 at an outside hospital which shows ischemic cardiomyopathy ejection fraction 15 to 20%. Patient has a defibrillator in place   Recommend close outpatient follow-up primary care provider, primary cardiology. Discharging Physician / Practitioner: Christin Boudreaux MD  PCP: Nabeel Kramer MD  Admission Date:   Admission Orders (From admission, onward)       Ordered        11/30/23 1924  INPATIENT ADMISSION  Once                          Discharge Date: 12/04/23    Medical Problems       Resolved Problems  Date Reviewed: 12/4/2023   None         Consultations During Hospital Stay:  None      Reason for Admission: Severe sepsis secondary to multilobar bacterial as well as viral RSV infection, ANNETTA on CKD. Hospital Course:     Campos Zaldivar is a 76 y.o. male patient with past medical history of ischemic cardiomyopathy with EF of 15 to 25%, ICD in place, AAA repair, dementia, insomnia, history of pulmonary emphysema, peripheral artery disease, history of CVA, CKD, who originally presented to the hospital on 11/30/2023 due to altered mental status. Patient was poor historian due to underlying dementia and was not able to provide information. History had obtained from sister at the bedside at the time. As per sister patient has been acting different since the morning of the presentation. He resides at assisted living and has a visiting nurse. Patient's sister was informed to present to his nurse about patient's being more confused, associate with diarrhea. On further evaluation patient was found to have severe sepsis as well as RSV positive and was hospitalized under medicine services. CT chest report noted with multilobar pneumonia, severe upper lobe pulmonary emphysema. Patient also noted with acute kidney injury on CKD and presented with creatinine of 4.18 with baseline being around 0.86. Also noted with leukocytosis 21, elevated procalcitonin 27. Patient was given broad-spectrum antibiotics. Nasal MRSA swab negative.   Urine strep and Legionella antigen negative. Sputum culture growing mixed respiratory kit. Now patient has completed a 5-day course of antibiotics. Blood cultures remain without growth in 72 hours. Severe sepsis is now resolved with leukocytosis resolved. Lactic acidosis resolved. Procalcitonin improved significantly. His home dose of Lasix and Entresto was hold and was treated with aggressive IV hydration however patient currently appears close to euvolemic and no signs of volume overload noted. Resumed Lasix and Entresto. Currently decision noted around 94 to 95% on room air on ambulation. Currently patient is hemodynamically stable for discharge. Recommended to repeat CT chest in 6 to 8 weeks with primary care provider to monitor resolution. No other events reported. Refer to earlier notes for further clarification. Please see above list of diagnoses and related plan for additional information. Condition at Discharge: good     Discharge Day Visit / Exam:     Subjective: Seen during a.m. rounds. Patient appears comfortable nondistressed. Sister present at the bedside. Reported much more reading of 89% of saturation likely an error since patient is saturating 94 to 95% on room air on ambulation on my encounter. Sister is present at bedside at the same time. Currently patient denies chest pain, dyspnea, fever, chills, nausea, vomiting, abdominal pain, any other new complaints. No other events reported. He reports feeling much better and eager to go home. Sister is agreeable with above plan. Vitals: Blood Pressure: 113/60 (12/04/23 1110)  Pulse: 60 (12/04/23 1110)  Temperature: (!) 97.4 °F (36.3 °C) (12/04/23 1159)  Temp Source: Oral (12/04/23 1159)  Respirations: 16 (12/03/23 1442)  Height: 6' (182.9 cm) (12/04/23 1110)  Weight - Scale: 83.5 kg (184 lb) (12/04/23 1110)  SpO2: (!) 89 % (12/04/23 0820)  Exam:   Physical Exam  Constitutional:       General: He is not in acute distress. Appearance: Normal appearance. He is not ill-appearing, toxic-appearing or diaphoretic. HENT:      Head: Normocephalic and atraumatic. Eyes:      Pupils: Pupils are equal, round, and reactive to light. Cardiovascular:      Rate and Rhythm: Normal rate. Pulses: Normal pulses. Pulmonary:      Effort: Pulmonary effort is normal. No respiratory distress. Breath sounds: No wheezing or rhonchi. Abdominal:      General: Bowel sounds are normal. There is no distension. Palpations: Abdomen is soft. Tenderness: There is no abdominal tenderness. Musculoskeletal:      Cervical back: No rigidity. Right lower leg: No edema. Left lower leg: No edema. Neurological:      Mental Status: He is alert and oriented to person, place, and time. Psychiatric:         Mood and Affect: Mood normal.         Behavior: Behavior normal.         Discussion with Family: sister present at bedside. Discharge instructions/Information to patient and family:   See after visit summary for information provided to patient and family. Provisions for Follow-Up Care:  See after visit summary for information related to follow-up care and any pertinent home health orders. Disposition:     Home with [facility] Services (Reminder: Complete face to face encounter)      Planned Readmission:      Discharge Statement:  I spent 35 minutes discharging the patient. This time was spent on the day of discharge. I had direct contact with the patient on the day of discharge. Greater than 50% of the total time was spent examining patient, answering all patient questions, arranging and discussing plan of care with patient as well as directly providing post-discharge instructions. Additional time then spent on discharge activities. Discharge Medications:  See after visit summary for reconciled discharge medications provided to patient and family.       ** Please Note: This note has been constructed using a voice recognition system **

## 2023-12-04 NOTE — PLAN OF CARE
Problem: INFECTION - ADULT  Goal: Absence or prevention of progression during hospitalization  Description: INTERVENTIONS:  - Assess and monitor for signs and symptoms of infection  - Monitor lab/diagnostic results  - Monitor all insertion sites, i.e. indwelling lines, tubes, and drains  - Monitor endotracheal if appropriate and nasal secretions for changes in amount and color  - [place] appropriate cooling/warming therapies per order  - Administer medications as ordered  - Instruct and encourage patient and family to use good hand hygiene technique  - Identify and instruct in appropriate isolation precautions for identified infection/condition  Outcome: Progressing

## 2023-12-04 NOTE — PLAN OF CARE
Problem: PAIN - ADULT  Goal: Verbalizes/displays adequate comfort level or baseline comfort level  Description: Interventions:  - Encourage patient to monitor pain and request assistance  - Assess pain using appropriate pain scale  - Administer analgesics based on type and severity of pain and evaluate response  - Implement non-pharmacological measures as appropriate and evaluate response  - Consider cultural and social influences on pain and pain management  - Notify physician/advanced practitioner if interventions unsuccessful or patient reports new pain  Outcome: Progressing     Problem: INFECTION - ADULT  Goal: Absence or prevention of progression during hospitalization  Description: INTERVENTIONS:  - Assess and monitor for signs and symptoms of infection  - Monitor lab/diagnostic results  - Monitor all insertion sites, i.e. indwelling lines, tubes, and drains  - Monitor endotracheal if appropriate and nasal secretions for changes in amount and color  - [place] appropriate cooling/warming therapies per order  - Administer medications as ordered  - Instruct and encourage patient and family to use good hand hygiene technique  - Identify and instruct in appropriate isolation precautions for identified infection/condition  Outcome: Progressing

## 2023-12-06 LAB
BACTERIA BLD CULT: NORMAL
BACTERIA BLD CULT: NORMAL

## 2024-01-01 ENCOUNTER — HOME CARE VISIT (OUTPATIENT)
Dept: HOME HEALTH SERVICES | Facility: HOME HEALTHCARE | Age: 69
End: 2024-01-01
Payer: MEDICARE

## 2024-01-01 ENCOUNTER — TELEPHONE (OUTPATIENT)
Age: 69
End: 2024-01-01

## 2024-01-01 ENCOUNTER — HOME CARE VISIT (OUTPATIENT)
Dept: HOME HOSPICE | Facility: HOSPICE | Age: 69
End: 2024-01-01
Payer: MEDICARE

## 2024-01-01 ENCOUNTER — TELEPHONE (OUTPATIENT)
Dept: RADIOLOGY | Facility: HOSPITAL | Age: 69
End: 2024-01-01

## 2024-01-01 ENCOUNTER — TELEPHONE (OUTPATIENT)
Dept: NEUROLOGY | Facility: CLINIC | Age: 69
End: 2024-01-01

## 2024-01-01 ENCOUNTER — HOSPICE ADMISSION (OUTPATIENT)
Dept: HOME HOSPICE | Facility: HOSPICE | Age: 69
End: 2024-01-01
Payer: MEDICARE

## 2024-01-01 DIAGNOSIS — R93.5 ABNORMAL ABDOMINAL CT SCAN: Primary | ICD-10-CM

## 2024-01-01 PROCEDURE — G0300 HHS/HOSPICE OF LPN EA 15 MIN: HCPCS

## 2024-01-01 PROCEDURE — 10330057 MEDICATION, GENERAL

## 2024-01-01 PROCEDURE — G0299 HHS/HOSPICE OF RN EA 15 MIN: HCPCS

## 2024-01-01 PROCEDURE — G0156 HHCP-SVS OF AIDE,EA 15 MIN: HCPCS

## 2024-01-01 PROCEDURE — 10330064 UNDERPAD, REUSE 36X36 1DZ     BECKCL

## 2024-01-01 PROCEDURE — G0155 HHCP-SVS OF CSW,EA 15 MIN: HCPCS

## 2024-01-01 PROCEDURE — 10330064 URINAL, W/TRANSPARENT LID (50/CS)

## 2024-01-01 PROCEDURE — 10330064 CUSHION, BED WEDGE W/WHT CVR 24X24X7 MAB

## 2024-01-01 PROCEDURE — 10330064 ALIGNER, FOAM BODY SM (8/CS)

## 2024-01-01 PROCEDURE — 10330087 HSPC SERVICE INTENSITY ADD-ON

## 2024-01-01 PROCEDURE — 10330064 CUSHION, COMPRESS COCCYX (4/CS)

## 2024-01-29 PROBLEM — J18.9 PNEUMONIA OF BOTH UPPER LOBES DUE TO INFECTIOUS ORGANISM: Status: RESOLVED | Noted: 2023-11-30 | Resolved: 2024-01-29

## 2024-01-29 PROBLEM — R65.20 SEVERE SEPSIS WITH ACUTE ORGAN DYSFUNCTION (HCC): Status: RESOLVED | Noted: 2023-11-30 | Resolved: 2024-01-29

## 2024-01-29 PROBLEM — A41.9 SEVERE SEPSIS WITH ACUTE ORGAN DYSFUNCTION (HCC): Status: RESOLVED | Noted: 2023-11-30 | Resolved: 2024-01-29

## 2024-04-24 NOTE — TELEPHONE ENCOUNTER
4/22/24, 11:05    Hi, this is Nayla Bolton. I just talked to somebody in your organization about my brother, Tom Pisano, birthday 1955, trying to get him establish w/ Dr. Madison's office. The woman wanted me to call his current neurologist to fax over all his medical records. They won't do it. They said it's your job. If you have any questions, can you give me a call, [phone]. Thank you so much.    Called [phone] and left a message on the answering machine letting her know that I received her message and forwarding her message to our scheduling team.     Pls see referral    thanks

## 2024-05-06 ENCOUNTER — TELEPHONE (OUTPATIENT)
Age: 69
End: 2024-05-06

## 2024-05-06 NOTE — TELEPHONE ENCOUNTER
Pt's sister calling back to request that paperwork for onboarding be mailed over to address on file. Sister has POA and will be away for apt, wants to make sure that paperwork gets filled out properly for when pt has to come into the office. Sister also advised that dose on :Lasix was changed to half a tablet daily in the morning.

## 2024-05-06 NOTE — TELEPHONE ENCOUNTER
Patients GI provider:  Dr. Valdez    Number to return call: [phone]    Reason for call:  R/S  OV B4 colon/ GI issue: runny stools     Scheduled procedure/appointment date if applicable: Appt 5/28/24

## 2024-05-18 ENCOUNTER — APPOINTMENT (EMERGENCY)
Dept: CT IMAGING | Facility: HOSPITAL | Age: 69
DRG: 374 | End: 2024-05-18
Payer: COMMERCIAL

## 2024-05-18 ENCOUNTER — HOSPITAL ENCOUNTER (INPATIENT)
Facility: HOSPITAL | Age: 69
LOS: 7 days | Discharge: HOME HEALTH | DRG: 374 | End: 2024-05-25
Attending: EMERGENCY MEDICINE | Admitting: STUDENT IN AN ORGANIZED HEALTH CARE EDUCATION/TRAINING PROGRAM
Payer: COMMERCIAL

## 2024-05-18 ENCOUNTER — APPOINTMENT (EMERGENCY)
Dept: RADIOLOGY | Facility: HOSPITAL | Age: 69
DRG: 374 | End: 2024-05-18
Payer: COMMERCIAL

## 2024-05-18 DIAGNOSIS — I95.9 HYPOTENSION: ICD-10-CM

## 2024-05-18 DIAGNOSIS — R62.7 FAILURE TO THRIVE IN ADULT: ICD-10-CM

## 2024-05-18 DIAGNOSIS — N17.9 AKI (ACUTE KIDNEY INJURY) (HCC): Primary | ICD-10-CM

## 2024-05-18 DIAGNOSIS — K76.9 LIVER LESION: ICD-10-CM

## 2024-05-18 DIAGNOSIS — C78.7 CANCER, METASTATIC TO LIVER (HCC): ICD-10-CM

## 2024-05-18 DIAGNOSIS — R93.5 ABNORMAL ABDOMINAL CT SCAN: ICD-10-CM

## 2024-05-18 PROBLEM — I48.91 ATRIAL FIBRILLATION (HCC): Status: ACTIVE | Noted: 2024-05-18

## 2024-05-18 PROBLEM — A41.9 SEPSIS (HCC): Status: ACTIVE | Noted: 2024-05-18

## 2024-05-18 PROBLEM — R65.20 SEVERE SEPSIS (HCC): Status: ACTIVE | Noted: 2024-05-18

## 2024-05-18 PROBLEM — R65.10 SIRS (SYSTEMIC INFLAMMATORY RESPONSE SYNDROME) (HCC): Status: ACTIVE | Noted: 2024-05-18

## 2024-05-18 LAB
2HR DELTA HS TROPONIN: -1 NG/L
ALBUMIN SERPL BCP-MCNC: 3.4 G/DL (ref 3.5–5)
ALP SERPL-CCNC: 261 U/L (ref 34–104)
ALT SERPL W P-5'-P-CCNC: 27 U/L (ref 7–52)
AMMONIA PLAS-SCNC: 17 UMOL/L (ref 18–72)
ANION GAP SERPL CALCULATED.3IONS-SCNC: 10 MMOL/L (ref 4–13)
APTT PPP: 35 SECONDS (ref 23–37)
AST SERPL W P-5'-P-CCNC: 75 U/L (ref 13–39)
ATRIAL RATE: 70 BPM
BASOPHILS # BLD AUTO: 0.01 THOUSANDS/ÂΜL (ref 0–0.1)
BASOPHILS NFR BLD AUTO: 0 % (ref 0–1)
BILIRUB DIRECT SERPL-MCNC: 0.15 MG/DL (ref 0–0.2)
BILIRUB SERPL-MCNC: 0.83 MG/DL (ref 0.2–1)
BILIRUB UR QL STRIP: NEGATIVE
BNP SERPL-MCNC: 77 PG/ML (ref 0–100)
BUN SERPL-MCNC: 41 MG/DL (ref 5–25)
CALCIUM SERPL-MCNC: 8.8 MG/DL (ref 8.4–10.2)
CARDIAC TROPONIN I PNL SERPL HS: 7 NG/L
CARDIAC TROPONIN I PNL SERPL HS: 8 NG/L
CHLORIDE SERPL-SCNC: 100 MMOL/L (ref 96–108)
CLARITY UR: CLEAR
CO2 SERPL-SCNC: 24 MMOL/L (ref 21–32)
COLOR UR: ABNORMAL
CREAT SERPL-MCNC: 1.59 MG/DL (ref 0.6–1.3)
EOSINOPHIL # BLD AUTO: 0.01 THOUSAND/ÂΜL (ref 0–0.61)
EOSINOPHIL NFR BLD AUTO: 0 % (ref 0–6)
ERYTHROCYTE [DISTWIDTH] IN BLOOD BY AUTOMATED COUNT: 17.9 % (ref 11.6–15.1)
FLUAV RNA RESP QL NAA+PROBE: NEGATIVE
FLUBV RNA RESP QL NAA+PROBE: NEGATIVE
GFR SERPL CREATININE-BSD FRML MDRD: 43 ML/MIN/1.73SQ M
GLUCOSE SERPL-MCNC: 108 MG/DL (ref 65–140)
GLUCOSE SERPL-MCNC: 90 MG/DL (ref 65–140)
GLUCOSE UR STRIP-MCNC: NEGATIVE MG/DL
HCT VFR BLD AUTO: 47 % (ref 36.5–49.3)
HGB BLD-MCNC: 14.5 G/DL (ref 12–17)
HGB UR QL STRIP.AUTO: NEGATIVE
IMM GRANULOCYTES # BLD AUTO: 0.1 THOUSAND/UL (ref 0–0.2)
IMM GRANULOCYTES NFR BLD AUTO: 1 % (ref 0–2)
INR PPP: 1.45 (ref 0.84–1.19)
KETONES UR STRIP-MCNC: NEGATIVE MG/DL
LACTATE SERPL-SCNC: 1.9 MMOL/L (ref 0.5–2)
LEUKOCYTE ESTERASE UR QL STRIP: NEGATIVE
LIPASE SERPL-CCNC: 8 U/L (ref 11–82)
LYMPHOCYTES # BLD AUTO: 0.86 THOUSANDS/ÂΜL (ref 0.6–4.47)
LYMPHOCYTES NFR BLD AUTO: 7 % (ref 14–44)
MAGNESIUM SERPL-MCNC: 2.3 MG/DL (ref 1.9–2.7)
MCH RBC QN AUTO: 26 PG (ref 26.8–34.3)
MCHC RBC AUTO-ENTMCNC: 30.9 G/DL (ref 31.4–37.4)
MCV RBC AUTO: 84 FL (ref 82–98)
MONOCYTES # BLD AUTO: 1.3 THOUSAND/ÂΜL (ref 0.17–1.22)
MONOCYTES NFR BLD AUTO: 10 % (ref 4–12)
NEUTROPHILS # BLD AUTO: 10.44 THOUSANDS/ÂΜL (ref 1.85–7.62)
NEUTS SEG NFR BLD AUTO: 82 % (ref 43–75)
NITRITE UR QL STRIP: NEGATIVE
NRBC BLD AUTO-RTO: 0 /100 WBCS
P AXIS: 68 DEGREES
PH UR STRIP.AUTO: 5 [PH]
PLATELET # BLD AUTO: 187 THOUSANDS/UL (ref 149–390)
PMV BLD AUTO: 11.6 FL (ref 8.9–12.7)
POTASSIUM SERPL-SCNC: 5.3 MMOL/L (ref 3.5–5.3)
PR INTERVAL: 186 MS
PROCALCITONIN SERPL-MCNC: 1.26 NG/ML
PROT SERPL-MCNC: 7.2 G/DL (ref 6.4–8.4)
PROT UR STRIP-MCNC: NEGATIVE MG/DL
PROTHROMBIN TIME: 18.4 SECONDS (ref 11.6–14.5)
QRS AXIS: -70 DEGREES
QRSD INTERVAL: 94 MS
QT INTERVAL: 398 MS
QTC INTERVAL: 429 MS
RBC # BLD AUTO: 5.58 MILLION/UL (ref 3.88–5.62)
RSV RNA RESP QL NAA+PROBE: NEGATIVE
SARS-COV-2 RNA RESP QL NAA+PROBE: NEGATIVE
SODIUM SERPL-SCNC: 134 MMOL/L (ref 135–147)
SP GR UR STRIP.AUTO: 1.05 (ref 1–1.03)
T WAVE AXIS: 21 DEGREES
TSH SERPL DL<=0.05 MIU/L-ACNC: 4.19 UIU/ML (ref 0.45–4.5)
UROBILINOGEN UR STRIP-ACNC: <2 MG/DL
VENTRICULAR RATE: 70 BPM
WBC # BLD AUTO: 12.72 THOUSAND/UL (ref 4.31–10.16)

## 2024-05-18 PROCEDURE — 87154 CUL TYP ID BLD PTHGN 6+ TRGT: CPT | Performed by: EMERGENCY MEDICINE

## 2024-05-18 PROCEDURE — 99285 EMERGENCY DEPT VISIT HI MDM: CPT | Performed by: EMERGENCY MEDICINE

## 2024-05-18 PROCEDURE — 93005 ELECTROCARDIOGRAM TRACING: CPT

## 2024-05-18 PROCEDURE — 96361 HYDRATE IV INFUSION ADD-ON: CPT

## 2024-05-18 PROCEDURE — 99223 1ST HOSP IP/OBS HIGH 75: CPT | Performed by: STUDENT IN AN ORGANIZED HEALTH CARE EDUCATION/TRAINING PROGRAM

## 2024-05-18 PROCEDURE — 84484 ASSAY OF TROPONIN QUANT: CPT | Performed by: EMERGENCY MEDICINE

## 2024-05-18 PROCEDURE — 74177 CT ABD & PELVIS W/CONTRAST: CPT

## 2024-05-18 PROCEDURE — 83690 ASSAY OF LIPASE: CPT | Performed by: EMERGENCY MEDICINE

## 2024-05-18 PROCEDURE — 99285 EMERGENCY DEPT VISIT HI MDM: CPT

## 2024-05-18 PROCEDURE — 71260 CT THORAX DX C+: CPT

## 2024-05-18 PROCEDURE — 85730 THROMBOPLASTIN TIME PARTIAL: CPT | Performed by: EMERGENCY MEDICINE

## 2024-05-18 PROCEDURE — 83605 ASSAY OF LACTIC ACID: CPT | Performed by: EMERGENCY MEDICINE

## 2024-05-18 PROCEDURE — 93010 ELECTROCARDIOGRAM REPORT: CPT | Performed by: INTERNAL MEDICINE

## 2024-05-18 PROCEDURE — 70450 CT HEAD/BRAIN W/O DYE: CPT

## 2024-05-18 PROCEDURE — 81003 URINALYSIS AUTO W/O SCOPE: CPT | Performed by: EMERGENCY MEDICINE

## 2024-05-18 PROCEDURE — 71045 X-RAY EXAM CHEST 1 VIEW: CPT

## 2024-05-18 PROCEDURE — 96365 THER/PROPH/DIAG IV INF INIT: CPT

## 2024-05-18 PROCEDURE — 82140 ASSAY OF AMMONIA: CPT | Performed by: EMERGENCY MEDICINE

## 2024-05-18 PROCEDURE — 84145 PROCALCITONIN (PCT): CPT | Performed by: EMERGENCY MEDICINE

## 2024-05-18 PROCEDURE — 80076 HEPATIC FUNCTION PANEL: CPT | Performed by: EMERGENCY MEDICINE

## 2024-05-18 PROCEDURE — 0241U HB NFCT DS VIR RESP RNA 4 TRGT: CPT | Performed by: EMERGENCY MEDICINE

## 2024-05-18 PROCEDURE — 82948 REAGENT STRIP/BLOOD GLUCOSE: CPT

## 2024-05-18 PROCEDURE — 85025 COMPLETE CBC W/AUTO DIFF WBC: CPT | Performed by: EMERGENCY MEDICINE

## 2024-05-18 PROCEDURE — 80048 BASIC METABOLIC PNL TOTAL CA: CPT | Performed by: EMERGENCY MEDICINE

## 2024-05-18 PROCEDURE — 84443 ASSAY THYROID STIM HORMONE: CPT | Performed by: EMERGENCY MEDICINE

## 2024-05-18 PROCEDURE — 87181 SC STD AGAR DILUTION PER AGT: CPT | Performed by: EMERGENCY MEDICINE

## 2024-05-18 PROCEDURE — 87040 BLOOD CULTURE FOR BACTERIA: CPT | Performed by: EMERGENCY MEDICINE

## 2024-05-18 PROCEDURE — 83880 ASSAY OF NATRIURETIC PEPTIDE: CPT | Performed by: EMERGENCY MEDICINE

## 2024-05-18 PROCEDURE — 83735 ASSAY OF MAGNESIUM: CPT | Performed by: EMERGENCY MEDICINE

## 2024-05-18 PROCEDURE — 36415 COLL VENOUS BLD VENIPUNCTURE: CPT | Performed by: EMERGENCY MEDICINE

## 2024-05-18 PROCEDURE — 85610 PROTHROMBIN TIME: CPT | Performed by: EMERGENCY MEDICINE

## 2024-05-18 RX ORDER — TEMAZEPAM 15 MG/1
15 CAPSULE ORAL
Status: DISCONTINUED | OUTPATIENT
Start: 2024-05-18 | End: 2024-05-25 | Stop reason: HOSPADM

## 2024-05-18 RX ORDER — MIRTAZAPINE 15 MG/1
30 TABLET, FILM COATED ORAL
Status: DISCONTINUED | OUTPATIENT
Start: 2024-05-18 | End: 2024-05-25 | Stop reason: HOSPADM

## 2024-05-18 RX ORDER — AMIODARONE HYDROCHLORIDE 200 MG/1
200 TABLET ORAL DAILY
COMMUNITY
Start: 2024-06-01 | End: 2024-06-09

## 2024-05-18 RX ORDER — ALBUMIN, HUMAN INJ 5% 5 %
25 SOLUTION INTRAVENOUS ONCE
Status: COMPLETED | OUTPATIENT
Start: 2024-05-18 | End: 2024-05-18

## 2024-05-18 RX ORDER — SODIUM CHLORIDE, SODIUM GLUCONATE, SODIUM ACETATE, POTASSIUM CHLORIDE, MAGNESIUM CHLORIDE, SODIUM PHOSPHATE, DIBASIC, AND POTASSIUM PHOSPHATE .53; .5; .37; .037; .03; .012; .00082 G/100ML; G/100ML; G/100ML; G/100ML; G/100ML; G/100ML; G/100ML
100 INJECTION, SOLUTION INTRAVENOUS CONTINUOUS
Status: DISCONTINUED | OUTPATIENT
Start: 2024-05-18 | End: 2024-05-19

## 2024-05-18 RX ORDER — METRONIDAZOLE 500 MG/1
500 TABLET ORAL EVERY 8 HOURS SCHEDULED
Status: DISCONTINUED | OUTPATIENT
Start: 2024-05-18 | End: 2024-05-21

## 2024-05-18 RX ORDER — ATORVASTATIN CALCIUM 40 MG/1
80 TABLET, FILM COATED ORAL
Status: DISCONTINUED | OUTPATIENT
Start: 2024-05-18 | End: 2024-05-25 | Stop reason: HOSPADM

## 2024-05-18 RX ORDER — PANTOPRAZOLE SODIUM 40 MG/1
40 TABLET, DELAYED RELEASE ORAL
Status: DISCONTINUED | OUTPATIENT
Start: 2024-05-19 | End: 2024-05-25 | Stop reason: HOSPADM

## 2024-05-18 RX ORDER — ALBUMIN, HUMAN INJ 5% 5 %
25 SOLUTION INTRAVENOUS ONCE
Status: DISCONTINUED | OUTPATIENT
Start: 2024-05-18 | End: 2024-05-18

## 2024-05-18 RX ORDER — ACETAMINOPHEN 325 MG/1
650 TABLET ORAL EVERY 6 HOURS PRN
Status: DISCONTINUED | OUTPATIENT
Start: 2024-05-18 | End: 2024-05-25 | Stop reason: HOSPADM

## 2024-05-18 RX ORDER — SODIUM CHLORIDE, SODIUM GLUCONATE, SODIUM ACETATE, POTASSIUM CHLORIDE, MAGNESIUM CHLORIDE, SODIUM PHOSPHATE, DIBASIC, AND POTASSIUM PHOSPHATE .53; .5; .37; .037; .03; .012; .00082 G/100ML; G/100ML; G/100ML; G/100ML; G/100ML; G/100ML; G/100ML
500 INJECTION, SOLUTION INTRAVENOUS ONCE
Status: COMPLETED | OUTPATIENT
Start: 2024-05-18 | End: 2024-05-18

## 2024-05-18 RX ADMIN — ALBUMIN (HUMAN) 25 G: 12.5 INJECTION, SOLUTION INTRAVENOUS at 17:25

## 2024-05-18 RX ADMIN — IOHEXOL 100 ML: 350 INJECTION, SOLUTION INTRAVENOUS at 10:56

## 2024-05-18 RX ADMIN — METRONIDAZOLE 500 MG: 500 TABLET ORAL at 22:29

## 2024-05-18 RX ADMIN — CEFTRIAXONE SODIUM 1000 MG: 10 INJECTION, POWDER, FOR SOLUTION INTRAVENOUS at 17:26

## 2024-05-18 RX ADMIN — ASPIRIN 81 MG: 81 TABLET, COATED ORAL at 15:54

## 2024-05-18 RX ADMIN — SODIUM CHLORIDE, SODIUM GLUCONATE, SODIUM ACETATE, POTASSIUM CHLORIDE, MAGNESIUM CHLORIDE, SODIUM PHOSPHATE, DIBASIC, AND POTASSIUM PHOSPHATE 100 ML/HR: .53; .5; .37; .037; .03; .012; .00082 INJECTION, SOLUTION INTRAVENOUS at 22:46

## 2024-05-18 RX ADMIN — METRONIDAZOLE 500 MG: 500 TABLET ORAL at 17:03

## 2024-05-18 RX ADMIN — ATORVASTATIN CALCIUM 80 MG: 40 TABLET, FILM COATED ORAL at 15:54

## 2024-05-18 RX ADMIN — SODIUM CHLORIDE 2535 ML: 0.9 INJECTION, SOLUTION INTRAVENOUS at 10:01

## 2024-05-18 RX ADMIN — APIXABAN 5 MG: 5 TABLET, FILM COATED ORAL at 17:03

## 2024-05-18 RX ADMIN — SODIUM CHLORIDE, SODIUM GLUCONATE, SODIUM ACETATE, POTASSIUM CHLORIDE, MAGNESIUM CHLORIDE, SODIUM PHOSPHATE, DIBASIC, AND POTASSIUM PHOSPHATE 100 ML/HR: .53; .5; .37; .037; .03; .012; .00082 INJECTION, SOLUTION INTRAVENOUS at 14:35

## 2024-05-18 RX ADMIN — MIRTAZAPINE 30 MG: 15 TABLET, FILM COATED ORAL at 21:55

## 2024-05-18 RX ADMIN — SODIUM CHLORIDE, SODIUM GLUCONATE, SODIUM ACETATE, POTASSIUM CHLORIDE, MAGNESIUM CHLORIDE, SODIUM PHOSPHATE, DIBASIC, AND POTASSIUM PHOSPHATE 500 ML: .53; .5; .37; .037; .03; .012; .00082 INJECTION, SOLUTION INTRAVENOUS at 15:54

## 2024-05-18 RX ADMIN — CEFTRIAXONE SODIUM 1000 MG: 10 INJECTION, POWDER, FOR SOLUTION INTRAVENOUS at 11:03

## 2024-05-18 NOTE — ASSESSMENT & PLAN NOTE
Wt Readings from Last 3 Encounters:   05/18/24 84.5 kg (186 lb 4.6 oz)   12/04/23 83.5 kg (184 lb)   08/29/23 87.1 kg (192 lb)   Patient appears volume depleted on exam  Holding home dose Lasix and antihypertensive regimen in setting of hypotension  Continue with gentle IV fluids

## 2024-05-18 NOTE — ASSESSMENT & PLAN NOTE
Creatinine 1.59, baseline appears to be around 1.  Suspect prerenal in setting of poor p.o. intake  UA pending  Avoid nephrotoxic agents  Monitor creatinine with a.m. BMP

## 2024-05-18 NOTE — ASSESSMENT & PLAN NOTE
Hx of a fib on eliquis and amiodarone daily  EKG with NSR and PAC  Given hypotension hold amidarone, resume once bp stable

## 2024-05-18 NOTE — H&P
Formerly [facility]  H&P  Name: Tom Pisano I  MRN: [id number]  Unit/Bed#: -01 I Date of Admission: 5/18/2024   Date of Service: 5/18/2024 I Hospital Day: 0    Assessment & Plan   Atrial fibrillation (HCC)  Assessment & Plan  Hx of a fib on eliquis and amiodarone daily  EKG with NSR and PAC  Given hypotension hold amidarone, resume once bp stable       Severe sepsis (HCC)  Assessment & Plan  As evidenced by tachycardia and leukocytosis, source of viral gastroenteritis.  Chest CT negative for acute infectious etiology.  Patient received IV ceftriaxone  S/p 30 cc/kg Iv fluids  Follow-up on UA  Continue with IV ceftriaxone and flagyl for now, consider d/c if infectious workup consistent with viral gastroenteritis  Monitor fever curve and WBC count    Abnormal abdominal CT scan  Assessment & Plan  Multiple hepatic metastases, developing since 11/30/2023. Retroperitoneal, peripancreatic and vinicius hepatis lymphadenopathy, also new since 11/30/2023.Site of primary malignancy unknown although there is subtle suggestion of a 0.7 x 1.1 cm mass at the junction of the pancreatic head and uncinate process. This can be better evaluated with MRI of the abdomen without and with IV contrast, with MRCP  If Cr improved on am labs, order MRI with and without contrast MRCP for further evaluation tomorrow   GI consulted, appreciate commendations    PAD (peripheral artery disease) (HCC)  Assessment & Plan  History of CAD, PAD, CVA currently on aspirin Eliquis and statin    Metabolic encephalopathy  Assessment & Plan  Family reports some mild baseline cognitive impairment, acutely worsened over the past 4 days.  Suspect metabolic secondary to severe sepsis, hypotension.  Per family it is slightly improving.  Alert and oriented x 2  Head CT negative for acute finding  Continue with IV antibiotics and plan as above  Monitor mental status closely    Chronic systolic heart failure (HCC)  Assessment & Plan  Wt  Readings from Last 3 Encounters:   05/18/24 84.5 kg (186 lb 4.6 oz)   12/04/23 83.5 kg (184 lb)   08/29/23 87.1 kg (192 lb)   Patient appears volume depleted on exam  Holding home dose Lasix and antihypertensive regimen in setting of hypotension  Continue with gentle IV fluids            Acute kidney injury (HCC)  Assessment & Plan  Creatinine 1.59, baseline appears to be around 1.  Suspect prerenal in setting of poor p.o. intake  UA pending  Avoid nephrotoxic agents  Monitor creatinine with a.m. BMP    Hypertension  Assessment & Plan  Holding home dose Lasix 40 mg daily and Coreg 25 mg twice daily entreso in setting of hypotension as above    Status post femorofemoral bypass surgery  Assessment & Plan  Follows with vascular surgery outpatient, Ct showing Aortobifemoral stent graft present with probable complete occlusion of the left iliac limb and probable occlusion of the femoral-femoral bypass graft. Per chart review and family, this is known. Peripheral pulses present.   Close monitoring  Consider inpatient vs outpatient vascular f/u     * Hypotension  Assessment & Plan  SBP's 80s, responding to IV fluids.  Now SBP's in the 110s.  No acute source of infection at this time, unclear etiology.  Continue with IV fluids  Infectious workup as above  Hold home antihypertensive regimen  Monitor vitals closely           VTE Pharmacologic Prophylaxis: VTE Score: 2 High Risk (Score >/= 5) - Pharmacological DVT Prophylaxis Ordered: apixaban (Eliquis). Sequential Compression Devices Ordered.  Code Status: Level 1 - Full Code   Discussion with family:  PT.     Anticipated Length of Stay: Patient will be admitted on an inpatient basis with an anticipated length of stay of greater than 2 midnights secondary to hypotension abnormal CT abdomen pelvis.    Total Time Spent on Date of Encounter in care of patient: 60 mins. This time was spent on one or more of the following: performing physical exam; counseling and coordination of  care; obtaining or reviewing history; documenting in the medical record; reviewing/ordering tests, medications or procedures; communicating with other healthcare professionals and discussing with patient's family/caregivers.    Chief Complaint: weakness, diarrhea    History of Present Illness:  Tom Pisano is a 69 y.o. male with a PMH of CHF, DVT, hypertension, dementia who presents with 4-day history of weakness, worsening loose watery brown diarrhea.  Notes some mild blood.  Denies nausea, vomiting.  Unintentional weight loss over the past couple weeks to months.  Poor p.o. intake.  Per family noted to be slightly confused.  Patient noted to be hypotensive on EMS in ED responding to IV fluids.  Patient admitted for severe sepsis possibly due to viral gastroenteritis, ANNETTA.    Review of Systems:  Review of Systems   Constitutional:  Positive for activity change. Negative for appetite change, fatigue and fever.   HENT:  Negative for congestion, postnasal drip, rhinorrhea and sinus pain.    Eyes:  Negative for photophobia, pain and discharge.   Respiratory:  Negative for apnea, cough, chest tightness, shortness of breath and wheezing.    Cardiovascular:  Negative for chest pain and leg swelling.   Gastrointestinal:  Positive for diarrhea. Negative for abdominal distention, abdominal pain, constipation, nausea and vomiting.   Endocrine: Negative for polyuria.   Genitourinary:  Negative for decreased urine volume, difficulty urinating, dysuria, hematuria and urgency.   Musculoskeletal:  Negative for back pain, myalgias and neck pain.   Skin:  Negative for pallor, rash and wound.   Neurological:  Positive for weakness. Negative for dizziness, tremors, light-headedness and headaches.   Hematological:  Does not bruise/bleed easily.   Psychiatric/Behavioral:  Negative for agitation, behavioral problems and suicidal ideas. The patient is not nervous/anxious and is not hyperactive.        Past Medical and Surgical  History:   Past Medical History:   Diagnosis Date    CHF (congestive heart failure) (HCC)     Dementia (HCC)     DVT (deep venous thrombosis) (HCC)     lt leg    Hypertension        Past Surgical History:   Procedure Laterality Date    ABDOMINAL AORTIC ANEURYSM REPAIR, OPEN      repaired x2       Meds/Allergies:  Prior to Admission medications    Medication Sig Start Date End Date Taking? Authorizing Provider   aspirin (ECOTRIN LOW STRENGTH) 81 mg EC tablet Take 81 mg by mouth daily    Historical Provider, MD   atorvastatin (LIPITOR) 80 mg tablet  23   Historical Provider, MD   carvedilol (COREG) 25 mg tablet  23   Historical Provider, MD   Eliquis 5 MG  23   Historical Provider, MD   Entresto 49-51 MG TABS  23   Historical Provider, MD   furosemide (LASIX) 40 mg tablet  23   Historical Provider, MD   Klor-Con M20 20 MEQ tablet  3/27/23   Historical Provider, MD   mirtazapine (REMERON) 30 mg tablet  23   Historical Provider, MD   pantoprazole (PROTONIX) 40 mg tablet  23   Historical Provider, MD   temazepam (RESTORIL) 30 mg capsule TAKE 1 TABLET AT BEDTIME WHEN NECESSARY FOR SLEEP 3/23/23   Historical Provider, MD     I have reviewed home medications with patient family member.    Allergies:   Allergies   Allergen Reactions    Shellfish-Derived Products - Food Allergy Hives       Social History:  Marital Status:      Substance Use History:   Social History     Substance and Sexual Activity   Alcohol Use Never     Social History     Tobacco Use   Smoking Status Former    Current packs/day: 0.00    Types: Cigarettes    Quit date: 1970    Years since quittin.4   Smokeless Tobacco Never     Social History     Substance and Sexual Activity   Drug Use Never       Family History:  Family History   Problem Relation Age of Onset    Heart disease Mother     Cancer Father     Heart disease Father     Heart disease Brother        Physical Exam:     Vitals:   Blood Pressure: (!)  83/53 (05/18/24 1505)  Pulse: 72 (05/18/24 1300)  Temperature: (!) 97 °F (36.1 °C) (05/18/24 1337)  Temp Source: Rectal (05/18/24 0953)  Respirations: 18 (05/18/24 1300)  Height: 6' (182.9 cm) (05/18/24 1429)  Weight - Scale: 84.5 kg (186 lb 4.6 oz) (05/18/24 0942)  SpO2: 94 % (05/18/24 1300)    Physical Exam  Constitutional:       General: He is not in acute distress.     Appearance: He is well-developed. He is not diaphoretic.   HENT:      Head: Normocephalic and atraumatic.      Mouth/Throat:      Pharynx: No oropharyngeal exudate.   Eyes:      General: No scleral icterus.     Extraocular Movements: Extraocular movements intact.      Conjunctiva/sclera: Conjunctivae normal.   Neck:      Vascular: No JVD.      Trachea: No tracheal deviation.   Cardiovascular:      Rate and Rhythm: Normal rate.      Heart sounds: No murmur heard.     No friction rub. No gallop.   Pulmonary:      Effort: Pulmonary effort is normal. No respiratory distress.      Breath sounds: No stridor. No wheezing.   Abdominal:      General: There is no distension.      Palpations: Abdomen is soft. There is no mass.      Tenderness: There is no abdominal tenderness. There is no right CVA tenderness or left CVA tenderness.   Musculoskeletal:         General: No tenderness. Normal range of motion.      Right lower leg: No edema.      Left lower leg: No edema.   Skin:     General: Skin is warm and dry.      Coloration: Skin is pale.      Findings: No erythema.   Neurological:      Mental Status: He is oriented to person, place, and time.   Psychiatric:         Behavior: Behavior normal.         Thought Content: Thought content normal.          Additional Data:     Lab Results:  Results from last 7 days   Lab Units 05/18/24  0954   WBC Thousand/uL 12.72*   HEMOGLOBIN g/dL 14.5   HEMATOCRIT % 47.0   PLATELETS Thousands/uL 187   SEGS PCT % 82*   LYMPHO PCT % 7*   MONO PCT % 10   EOS PCT % 0     Results from last 7 days   Lab Units 05/18/24  0954    SODIUM mmol/L 134*   POTASSIUM mmol/L 5.3   CHLORIDE mmol/L 100   CO2 mmol/L 24   BUN mg/dL 41*   CREATININE mg/dL 1.59*   ANION GAP mmol/L 10   CALCIUM mg/dL 8.8   ALBUMIN g/dL 3.4*   TOTAL BILIRUBIN mg/dL 0.83   ALK PHOS U/L 261*   ALT U/L 27   AST U/L 75*   GLUCOSE RANDOM mg/dL 90     Results from last 7 days   Lab Units 05/18/24  0954   INR  1.45*     Results from last 7 days   Lab Units 05/18/24  0941   POC GLUCOSE mg/dl 108         Results from last 7 days   Lab Units 05/18/24  0954   LACTIC ACID mmol/L 1.9   PROCALCITONIN ng/ml 1.26*       Lines/Drains:  Invasive Devices       Peripheral Intravenous Line  Duration             Peripheral IV 05/18/24 Left Antecubital <1 day    Peripheral IV 05/18/24 Right;Ventral (anterior) Forearm <1 day                        Imaging: Reviewed radiology reports from this admission including: abdominal/pelvic CT  CT head without contrast   Final Result by Devon Mccabe MD (05/18 3800)      No acute intracranial abnormality.                  Workstation performed: [id number]         CT chest abdomen pelvis w contrast   Final Result by Devon Mccabe MD (05/18 0684)      1.  Multiple hepatic metastases, developing since 11/30/2023.      2.  Retroperitoneal, peripancreatic and vinicius hepatis lymphadenopathy, also new since 11/30/2023.      3.  Site of primary malignancy unknown although there is subtle suggestion of a 0.7 x 1.1 cm mass at the junction of the pancreatic head and uncinate process. This can be better evaluated with MRI of the abdomen without and with IV contrast, with MRCP.      4.  Aortobifemoral stent graft present with probable complete occlusion of the left iliac limb and probable occlusion of the femoral-femoral bypass graft.      5.  Severe emphysema.      6.  Other than subsegmental atelectasis in the bases of the both lower lobes, no evidence of acute abnormality in the chest.         I personally discussed this study with MAUREEN MIRELES  SONDRA on 5/18/2024 12:27 PM.               Workstation performed: [id number]         XR chest 1 view portable   ED Interpretation by Bridget Irene DO (05/18 1010)   No acute abnormality in the chest.      Final Result by Lurdes Mathis MD (05/18 8346)      No acute cardiopulmonary disease.            Workstation performed: [id number]             EKG and Other Studies Reviewed on Admission:   EKG: NSR. HR 70.    ** Please Note: This note has been constructed using a voice recognition system. **

## 2024-05-18 NOTE — PLAN OF CARE
Problem: CARDIOVASCULAR - ADULT  Goal: Maintains optimal cardiac output and hemodynamic stability  Description: INTERVENTIONS:  - Monitor I/O, vital signs and rhythm  - Monitor for S/S and trends of decreased cardiac output  - Administer and titrate ordered vasoactive medications to optimize hemodynamic stability  - Assess quality of pulses, skin color and temperature  - Assess for signs of decreased coronary artery perfusion  - Instruct patient to report change in severity of symptoms  Outcome: Progressing  Goal: Absence of cardiac dysrhythmias or at baseline rhythm  Description: INTERVENTIONS:  - Continuous cardiac monitoring, vital signs, obtain 12 lead EKG if ordered  - Administer antiarrhythmic and heart rate control medications as ordered  - Monitor electrolytes and administer replacement therapy as ordered  Outcome: Progressing     Problem: METABOLIC, FLUID AND ELECTROLYTES - ADULT  Goal: Electrolytes maintained within normal limits  Description: INTERVENTIONS:  - Monitor labs and assess patient for signs and symptoms of electrolyte imbalances  - Administer electrolyte replacement as ordered  - Monitor response to electrolyte replacements, including repeat lab results as appropriate  - Instruct patient on fluid and nutrition as appropriate  Outcome: Progressing  Goal: Fluid balance maintained  Description: INTERVENTIONS:  - Monitor labs   - Monitor I/O and WT  - Instruct patient on fluid and nutrition as appropriate  - Assess for signs & symptoms of volume excess or deficit  Outcome: Progressing     Problem: PAIN - ADULT  Goal: Verbalizes/displays adequate comfort level or baseline comfort level  Description: Interventions:  - Encourage patient to monitor pain and request assistance  - Assess pain using appropriate pain scale  - Administer analgesics based on type and severity of pain and evaluate response  - Implement non-pharmacological measures as appropriate and evaluate response  - Consider cultural  and social influences on pain and pain management  - Notify physician/advanced practitioner if interventions unsuccessful or patient reports new pain  Outcome: Progressing     Problem: INFECTION - ADULT  Goal: Absence or prevention of progression during hospitalization  Description: INTERVENTIONS:  - Assess and monitor for signs and symptoms of infection  - Monitor lab/diagnostic results  - Monitor all insertion sites, i.e. indwelling lines, tubes, and drains  - Monitor endotracheal if appropriate and nasal secretions for changes in amount and color  - [place] appropriate cooling/warming therapies per order  - Administer medications as ordered  - Instruct and encourage patient and family to use good hand hygiene technique  - Identify and instruct in appropriate isolation precautions for identified infection/condition  Outcome: Progressing  Goal: Absence of fever/infection during neutropenic period  Description: INTERVENTIONS:  - Monitor WBC    Outcome: Progressing     Problem: SAFETY ADULT  Goal: Patient will remain free of falls  Description: INTERVENTIONS:  - Educate patient/family on patient safety including physical limitations  - Instruct patient to call for assistance with activity   - Consult OT/PT to assist with strengthening/mobility   - Keep Call bell within reach  - Keep bed low and locked with side rails adjusted as appropriate  - Keep care items and personal belongings within reach  - Initiate and maintain comfort rounds  - Make Fall Risk Sign visible to staff  - Offer Toileting every 2 Hours, in advance of need  - Initiate/Maintain bed alarm  - Obtain necessary fall risk management equipment: chair alarm  - Apply yellow socks and bracelet for high fall risk patients  - Consider moving patient to room near nurses station  Outcome: Progressing  Goal: Maintain or return to baseline ADL function  Description: INTERVENTIONS:  -  Assess patient's ability to carry out ADLs; assess patient's baseline for ADL  function and identify physical deficits which impact ability to perform ADLs (bathing, care of mouth/teeth, toileting, grooming, dressing, etc.)  - Assess/evaluate cause of self-care deficits   - Assess range of motion  - Assess patient's mobility; develop plan if impaired  - Assess patient's need for assistive devices and provide as appropriate  - Encourage maximum independence but intervene and supervise when necessary  - Involve family in performance of ADLs  - Assess for home care needs following discharge   - Consider OT consult to assist with ADL evaluation and planning for discharge  - Provide patient education as appropriate  Outcome: Progressing  Goal: Maintains/Returns to pre admission functional level  Description: INTERVENTIONS:  - Perform AM-PAC 6 Click Basic Mobility/ Daily Activity assessment daily.  - Set and communicate daily mobility goal to care team and patient/family/caregiver.   - Collaborate with rehabilitation services on mobility goals if consulted  - Perform Range of Motion 3 times a day.  - Reposition patient every 3 hours.  - Dangle patient 3 times a day  - Stand patient 3 times a day  - Ambulate patient 3 times a day  - Out of bed to chair 3 times a day   - Out of bed for meals 3 times a day  - Out of bed for toileting  - Record patient progress and toleration of activity level   Outcome: Progressing     Problem: DISCHARGE PLANNING  Goal: Discharge to home or other facility with appropriate resources  Description: INTERVENTIONS:  - Identify barriers to discharge w/patient and caregiver  - Arrange for needed discharge resources and transportation as appropriate  - Identify discharge learning needs (meds, wound care, etc.)  - Arrange for interpretive services to assist at discharge as needed  - Refer to Case Management Department for coordinating discharge planning if the patient needs post-hospital services based on physician/advanced practitioner order or complex needs related to  functional status, cognitive ability, or social support system  Outcome: Progressing     Problem: Knowledge Deficit  Goal: Patient/family/caregiver demonstrates understanding of disease process, treatment plan, medications, and discharge instructions  Description: Complete learning assessment and assess knowledge base.  Interventions:  - Provide teaching at level of understanding  - Provide teaching via preferred learning methods  Outcome: Progressing

## 2024-05-18 NOTE — ASSESSMENT & PLAN NOTE
Hx of a fib on eliquis and amiodarone daily  EKG with NSR and PAC  Given that blood pressure is stable  Will resume po amiodarone

## 2024-05-18 NOTE — ED PROVIDER NOTES
"History  Chief Complaint   Patient presents with    Altered Mental Status     Pt presents via ems from home. Per ems, \"pt has hx dementia, but per caregiver pt has been more altered the past week. Decrease appetite. C/o bilateral leg weakness. Caregiver also noticed diarrhea with some blood in the toilet this morning. Pt hypotensive en route - bp 60-70 systolic. Hx AAA, CHF, HTN.\" Pt awake and alert upon arrival.      Patient is a 69-year-old male with past medical history of dementia, prior DVT for which she is on Eliquis, hypertension, congestive heart failure, hyperlipidemia, GERD, AAA status post repair, presents to the emergency department by ambulance for 1 week of generalized weakness, ambulatory dysfunction, worsening confusion and poor appetite.  Sister who is also patient's caregiver as well as EMS provided majority of the history.  She states that he has had very poor appetite over the last 1 week and seems to be very weak.  She also feels he is unsteady when walking.  Patient has a visiting nurse from 8 AM to 5 PM but after that, he is alone in the house.  No known fall or recent injury.  Sister reports that there was diarrhea in the toilet this morning after patient use the bathroom and there was a couple of drops of blood in the toilet.  When asking patient he denies or is unaware of any diarrhea.  Patient overall is a poor historian due to dementia history and other than feeling slightly weak, he has no other complaints and review of systems from patient is negative.  EMS noted that patient's initial blood pressure was 60 systolic.  They had difficult time establishing IV access.  On arrival to the ED, blood pressure 80s over 50s.  Heart rate in the 60s.  Patient has normal respirations and normal O2 sat.  He does not appear to be in any distress.  Patient denies any chest pain or dyspnea, feeling dizzy or lightheaded.  He denies abdominal pain, nausea, vomiting and when asked about blood in his stool " "or diarrhea he states \"no.\"      History provided by:  Patient, EMS personnel and caregiver (Patient sister who is also patient's POA and caregiver)   used: No    Altered Mental Status  Presenting symptoms: confusion    Associated symptoms: weakness    Associated symptoms: no abdominal pain, no fever, no hallucinations, no headaches, no light-headedness, no nausea, no palpitations, no rash and no vomiting        Prior to Admission Medications   Prescriptions Last Dose Informant Patient Reported? Taking?   Eliquis 5 MG 5/17/2024 Self Yes Yes   Entresto 49-51 MG TABS 5/17/2024 Self Yes Yes   Klor-Con M20 20 MEQ tablet 5/17/2024 Self Yes Yes   amiodarone 200 mg tablet 5/17/2024  Yes Yes   Sig: Take 200 mg by mouth daily   aspirin (ECOTRIN LOW STRENGTH) 81 mg EC tablet 5/17/2024 Self Yes Yes   Sig: Take 81 mg by mouth daily   atorvastatin (LIPITOR) 80 mg tablet 5/17/2024 Self Yes Yes   carvedilol (COREG) 25 mg tablet 5/17/2024 Self Yes Yes   furosemide (LASIX) 40 mg tablet 5/17/2024 Self Yes Yes   mirtazapine (REMERON) 30 mg tablet 5/17/2024 Self Yes Yes   pantoprazole (PROTONIX) 40 mg tablet 5/17/2024 Self Yes Yes   temazepam (RESTORIL) 30 mg capsule 5/17/2024 Self Yes Yes   Sig: TAKE 1 TABLET AT BEDTIME WHEN NECESSARY FOR SLEEP      Facility-Administered Medications: None       Past Medical History:   Diagnosis Date    CHF (congestive heart failure) (HCC)     Dementia (HCC)     DVT (deep venous thrombosis) ([facility])     lt leg    Hypertension        Past Surgical History:   Procedure Laterality Date    ABDOMINAL AORTIC ANEURYSM REPAIR, OPEN      repaired x2       Family History   Problem Relation Age of Onset    Heart disease Mother     Cancer Father     Heart disease Father     Heart disease Brother      I have reviewed and agree with the history as documented.    E-Cigarette/Vaping    E-Cigarette Use Never User      E-Cigarette/Vaping Substances    Nicotine No     THC No     CBD No     Flavoring No  "    Other No     Unknown No      Social History     Tobacco Use    Smoking status: Former     Current packs/day: 0.00     Types: Cigarettes     Quit date: 1970     Years since quittin.4    Smokeless tobacco: Never   Vaping Use    Vaping status: Never Used   Substance Use Topics    Alcohol use: Never    Drug use: Never       Review of Systems   Unable to perform ROS: Dementia   Constitutional:  Positive for appetite change. Negative for chills and fever.        +Generalized weakness.    HENT:  Negative for congestion, ear pain, rhinorrhea and sore throat.    Eyes:  Negative for pain and visual disturbance.   Respiratory:  Negative for cough, chest tightness, shortness of breath and wheezing.    Cardiovascular:  Negative for chest pain, palpitations and leg swelling.   Gastrointestinal:  Positive for blood in stool and diarrhea. Negative for abdominal pain, nausea and vomiting.   Genitourinary:  Negative for dysuria, flank pain, frequency and hematuria.   Musculoskeletal:  Positive for gait problem. Negative for back pain and neck stiffness.   Skin:  Negative for color change, pallor, rash and wound.   Neurological:  Positive for weakness. Negative for dizziness, syncope, facial asymmetry, speech difficulty, light-headedness, numbness and headaches.   Hematological:  Negative for adenopathy. Bruises/bleeds easily.   Psychiatric/Behavioral:  Positive for confusion. Negative for hallucinations.        Physical Exam  Physical Exam  Vitals and nursing note reviewed.   Constitutional:       General: He is not in acute distress.     Appearance: Normal appearance. He is well-developed. He is not ill-appearing, toxic-appearing or diaphoretic.   HENT:      Head: Normocephalic and atraumatic.      Right Ear: External ear normal.      Left Ear: External ear normal.      Nose: Nose normal.      Mouth/Throat:      Mouth: Mucous membranes are moist.      Pharynx: Oropharynx is clear.   Eyes:      Extraocular Movements:  Extraocular movements intact.      Conjunctiva/sclera: Conjunctivae normal.      Pupils: Pupils are equal, round, and reactive to light.   Neck:      Vascular: No JVD.   Cardiovascular:      Rate and Rhythm: Normal rate and regular rhythm.      Pulses: Normal pulses.      Heart sounds: Normal heart sounds. No murmur heard.     No friction rub. No gallop.   Pulmonary:      Effort: Pulmonary effort is normal. No respiratory distress.      Breath sounds: Normal breath sounds. No wheezing, rhonchi or rales.   Abdominal:      General: There is no distension.      Palpations: Abdomen is soft. There is no mass.      Tenderness: There is no abdominal tenderness. There is no guarding or rebound.   Musculoskeletal:         General: No swelling or tenderness. Normal range of motion.      Cervical back: Normal range of motion and neck supple. No rigidity.      Right lower leg: No edema.      Left lower leg: No edema.   Skin:     General: Skin is warm and dry.      Coloration: Skin is not pale.      Findings: No erythema or rash.   Neurological:      General: No focal deficit present.      Mental Status: He is alert. Mental status is at baseline.      Cranial Nerves: No cranial nerve deficit.      Sensory: No sensory deficit.      Motor: No weakness.      Comments: Patient oriented to person and place but disoriented to time which according to sister and caregiver is his baseline.   Psychiatric:         Mood and Affect: Mood normal.         Behavior: Behavior normal.         Vital Signs  ED Triage Vitals   Temperature Pulse Respirations Blood Pressure SpO2   05/18/24 0953 05/18/24 0942 05/18/24 0942 05/18/24 0942 05/18/24 0942   98 °F (36.7 °C) 68 18 (!) 84/50 96 %      Temp Source Heart Rate Source Patient Position - Orthostatic VS BP Location FiO2 (%)   05/18/24 0953 05/18/24 0942 05/18/24 0945 05/18/24 1000 --   Rectal Monitor Lying Right arm       Pain Score       05/18/24 1429       No Pain         Vitals:    05/18/24 1245  05/18/24 1300 05/18/24 1337 05/18/24 1429   BP: (!) 83/52 94/53 93/61    BP Location: Right arm      Pulse: 72 72     Resp: 16 18     Temp:   (!) 97 °F (36.1 °C)    TempSrc:       SpO2: 93% 94%     Weight:       Height:    6' (1.829 m)          Visual Acuity      ED Medications  Medications   multi-electrolyte (PLASMALYTE-A/ISOLYTE-S PH 7.4) IV solution (100 mL/hr Intravenous New Bag 5/18/24 1435)   sodium chloride 0.9 % bolus 2,535 mL (0 mL Intravenous Stopped 5/18/24 1400)   ceftriaxone (ROCEPHIN) 1 g/50 mL in dextrose IVPB (0 mg Intravenous Stopped 5/18/24 1133)   iohexol (OMNIPAQUE) 350 MG/ML injection (MULTI-DOSE) 100 mL (100 mL Intravenous Given 5/18/24 1056)       Diagnostic Studies  Results Reviewed       Procedure Component Value Units Date/Time    UA w Reflex to Microscopic w Reflex to Culture [[id number]]  (Abnormal) Collected: 05/18/24 1416    Lab Status: Final result Specimen: Urine, Clean Catch Updated: 05/18/24 1422     Color, UA Light Yellow     Clarity, UA Clear     Specific Gravity, UA 1.048     pH, UA 5.0     Leukocytes, UA Negative     Nitrite, UA Negative     Protein, UA Negative mg/dl      Glucose, UA Negative mg/dl      Ketones, UA Negative mg/dl      Urobilinogen, UA <2.0 mg/dl      Bilirubin, UA Negative     Occult Blood, UA Negative    HS Troponin I 2hr [[id number]]  (Normal) Collected: 05/18/24 1254    Lab Status: Final result Specimen: Blood from Arm, Right Updated: 05/18/24 1326     hs TnI 2hr 7 ng/L      Delta 2hr hsTnI -1 ng/L     HS Troponin I 4hr [[id number]]     Lab Status: No result Specimen: Blood     TSH, 3rd generation with Free T4 reflex [[id number]]  (Normal) Collected: 05/18/24 0954    Lab Status: Final result Specimen: Blood from Arm, Left Updated: 05/18/24 1044     TSH 3RD GENERATON 4.188 uIU/mL     FLU/RSV/COVID - if FLU/RSV clinically relevant [[id number]]  (Normal) Collected: 05/18/24 0954    Lab Status: Final result Specimen: Nares from Nose Updated: 05/18/24 1047      SARS-CoV-2 Negative     INFLUENZA A PCR Negative     INFLUENZA B PCR Negative     RSV PCR Negative    Narrative:      FOR PEDIATRIC PATIENTS - copy/paste COVID Guidelines URL to browser: [email or web address]    SARS-CoV-2 assay is a Nucleic Acid Amplification assay intended for the  qualitative detection of nucleic acid from SARS-CoV-2 in nasopharyngeal  swabs. Results are for the presumptive identification of SARS-CoV-2 RNA.    Positive results are indicative of infection with SARS-CoV-2, the virus  causing COVID-19, but do not rule out bacterial infection or co-infection  with other viruses. Laboratories within the [place] and its  territories are required to report all positive results to the appropriate  public health authorities. Negative results do not preclude SARS-CoV-2  infection and should not be used as the sole basis for treatment or other  patient management decisions. Negative results must be combined with  clinical observations, patient history, and epidemiological information.  This test has not been FDA cleared or approved.    This test has been authorized by FDA under an Emergency Use Authorization  (EUA). This test is only authorized for the duration of time the  declaration that circumstances exist justifying the authorization of the  emergency use of an in vitro diagnostic tests for detection of SARS-CoV-2  virus and/or diagnosis of COVID-19 infection under section 564(b)(1) of  the Act, 21 U.S.C. 360bbb-3(b)(1), unless the authorization is terminated  or revoked sooner. The test has been validated but independent review by FDA  and CLIA is pending.    Test performed using [employer]: This RT-PCR assay targets N2,  a region unique to SARS-CoV-2. A conserved region in the E-gene was chosen  for pan-Sarbecovirus detection which includes SARS-CoV-2.    According to CMS-2020-01-R, this platform meets the definition of high-throughput  technology.    Procalcitonin [[id number]]  (Abnormal) Collected: 05/18/24 0954    Lab Status: Final result Specimen: Blood from Arm, Left Updated: 05/18/24 1037     Procalcitonin 1.26 ng/ml     B-Type Natriuretic Peptide(BNP) [[id number]]  (Normal) Collected: 05/18/24 0954    Lab Status: Final result Specimen: Blood from Arm, Left Updated: 05/18/24 1033     BNP 77 pg/mL     HS Troponin 0hr (reflex protocol) [[id number]]  (Normal) Collected: 05/18/24 0954    Lab Status: Final result Specimen: Blood from Arm, Left Updated: 05/18/24 1032     hs TnI 0hr 8 ng/L     Basic metabolic panel [[id number]]  (Abnormal) Collected: 05/18/24 0954    Lab Status: Final result Specimen: Blood from Arm, Left Updated: 05/18/24 1030     Sodium 134 mmol/L      Potassium 5.3 mmol/L      Chloride 100 mmol/L      CO2 24 mmol/L      ANION GAP 10 mmol/L      BUN 41 mg/dL      Creatinine 1.59 mg/dL      Glucose 90 mg/dL      Calcium 8.8 mg/dL      eGFR 43 ml/min/1.73sq m     Narrative:      National Kidney Disease Foundation guidelines for Chronic Kidney Disease (CKD):     Stage 1 with normal or high GFR (GFR > 90 mL/min/1.73 square meters)    Stage 2 Mild CKD (GFR = 60-89 mL/min/1.73 square meters)    Stage 3A Moderate CKD (GFR = 45-59 mL/min/1.73 square meters)    Stage 3B Moderate CKD (GFR = 30-44 mL/min/1.73 square meters)    Stage 4 Severe CKD (GFR = 15-29 mL/min/1.73 square meters)    Stage 5 End Stage CKD (GFR <15 mL/min/1.73 square meters)  Note: GFR calculation is accurate only with a steady state creatinine    Hepatic function panel [[id number]]  (Abnormal) Collected: 05/18/24 0954    Lab Status: Final result Specimen: Blood from Arm, Left Updated: 05/18/24 1030     Total Bilirubin 0.83 mg/dL      Bilirubin, Direct 0.15 mg/dL      Alkaline Phosphatase 261 U/L      AST 75 U/L      ALT 27 U/L      Total Protein 7.2 g/dL      Albumin 3.4 g/dL     Magnesium [[id number]]  (Normal) Collected: 05/18/24 0954    Lab Status: Final result Specimen:  Blood from Arm, Left Updated: 05/18/24 1030     Magnesium 2.3 mg/dL     Lipase [[id number]]  (Abnormal) Collected: 05/18/24 0954    Lab Status: Final result Specimen: Blood from Arm, Left Updated: 05/18/24 1030     Lipase 8 u/L     Lactic acid, plasma (w/reflex if result > 2.0) [[id number]]  (Normal) Collected: 05/18/24 0954    Lab Status: Final result Specimen: Blood from Arm, Left Updated: 05/18/24 1028     LACTIC ACID 1.9 mmol/L     Narrative:      Result may be elevated if tourniquet was used during collection.    Protime-INR [[id number]]  (Abnormal) Collected: 05/18/24 0954    Lab Status: Final result Specimen: Blood from Arm, Left Updated: 05/18/24 1028     Protime 18.4 seconds      INR 1.45    APTT [[id number]]  (Normal) Collected: 05/18/24 0954    Lab Status: Final result Specimen: Blood from Arm, Left Updated: 05/18/24 1028     PTT 35 seconds     Ammonia [[id number]]  (Abnormal) Collected: 05/18/24 0954    Lab Status: Final result Specimen: Blood from Arm, Left Updated: 05/18/24 1027     Ammonia 17 umol/L     CBC and differential [[id number]]  (Abnormal) Collected: 05/18/24 0954    Lab Status: Final result Specimen: Blood from Arm, Left Updated: 05/18/24 1006     WBC 12.72 Thousand/uL      RBC 5.58 Million/uL      Hemoglobin 14.5 g/dL      Hematocrit 47.0 %      MCV 84 fL      MCH 26.0 pg      MCHC 30.9 g/dL      RDW 17.9 %      MPV 11.6 fL      Platelets 187 Thousands/uL      nRBC 0 /100 WBCs      Segmented % 82 %      Immature Grans % 1 %      Lymphocytes % 7 %      Monocytes % 10 %      Eosinophils Relative 0 %      Basophils Relative 0 %      Absolute Neutrophils 10.44 Thousands/µL      Absolute Immature Grans 0.10 Thousand/uL      Absolute Lymphocytes 0.86 Thousands/µL      Absolute Monocytes 1.30 Thousand/µL      Eosinophils Absolute 0.01 Thousand/µL      Basophils Absolute 0.01 Thousands/µL     Blood culture #1 [[id number]] Collected: 05/18/24 0955    Lab Status: In process Specimen: Blood from Arm, Left  Updated: 05/18/24 1002    Blood culture #2 [[id number]] Collected: 05/18/24 0954    Lab Status: In process Specimen: Blood from Arm, Left Updated: 05/18/24 1002    Fingerstick Glucose (POCT) [[id number]]  (Normal) Collected: 05/18/24 0941    Lab Status: Final result Specimen: Blood Updated: 05/18/24 0942     POC Glucose 108 mg/dl                    CT head without contrast   Final Result by Devon Mccabe MD (05/18 1145)      No acute intracranial abnormality.                  Workstation performed: [id number]         CT chest abdomen pelvis w contrast   Final Result by Devon Mccabe MD (05/18 1243)      1.  Multiple hepatic metastases, developing since 11/30/2023.      2.  Retroperitoneal, peripancreatic and vinicius hepatis lymphadenopathy, also new since 11/30/2023.      3.  Site of primary malignancy unknown although there is subtle suggestion of a 0.7 x 1.1 cm mass at the junction of the pancreatic head and uncinate process. This can be better evaluated with MRI of the abdomen without and with IV contrast, with MRCP.      4.  Aortobifemoral stent graft present with probable complete occlusion of the left iliac limb and probable occlusion of the femoral-femoral bypass graft.      5.  Severe emphysema.      6.  Other than subsegmental atelectasis in the bases of the both lower lobes, no evidence of acute abnormality in the chest.         I personally discussed this study with BRIDGET IRENE on 5/18/2024 12:27 PM.               Workstation performed: [id number]         XR chest 1 view portable   ED Interpretation by Bridget Irene DO (05/18 1018)   No acute abnormality in the chest.      Final Result by Lurdes Mathis MD (05/18 1259)      No acute cardiopulmonary disease.            Workstation performed: [id number]                    Procedures  ECG 12 Lead Documentation Only    Date/Time: 5/18/2024 9:57 AM    Performed by: Bridget Irene DO  Authorized by: Bridget  Marcia Irene DO    ECG reviewed by me, the ED Provider: yes    Patient location:  ED  Previous ECG:     Previous ECG:  Compared to current    Comparison ECG info:  11-    Similarity:  No change  Rate:     ECG rate:  70    ECG rate assessment: normal    Rhythm:     Rhythm: sinus rhythm    Ectopy:     Ectopy: PAC    QRS:     QRS axis:  Left    QRS intervals:  Normal  Conduction:     Conduction: abnormal      Abnormal conduction: incomplete RBBB    ST segments:     ST segments:  Normal  T waves:     T waves: non-specific and inverted      Inverted:  V3 and V4  Comments:      Low voltage QRS complex.           ED Course  ED Course as of 05/18/24 1449   Sat May 18, 2024   0958 Temperature: 98 °F (36.7 °C)   0959 Temp Source: Rectal   1033 Creatinine(!): 1.59   1033 GFR, Calculated: 43  ANNETTA present, likely secondary to dehydration.    1033 LACTIC ACID: 1.9   1034 ALK PHOS(!): 261   1034 AST(!): 75  Mild new transaminitis.    1104 Procalcitonin(!): 1.26   1104 Patient's blood pressure persistently 88/56.  He is still getting his first liter of IV fluids.  Sepsis alert called and will start IV Rocephin to cover for bacterial infection given elevated procalcitonin.  Source still unclear.   1227 Spoke to radiologist, Dr. Mccabe, who called about significant CT findings of innumerable liver metastasis and retroperitoneal and peripancreatic lymphadenopathy.  Unclear where the primary cancer may be but he suspects pancreatic head cancer and recommends MRI or MRCP for further evaluation.  He did not see any acute inflammatory or infectious abnormality on CT.   1233 Updated patient and patient's 2 sisters who are with him including his POA, about significant CT findings of liver lesions and intra-abdominal and retroperitoneal lymphadenopathy concerning for metastatic cancer.  I also discussed blood work results including ANNETTA and recommended admission for further hydration due to the hypotension and ANNETTA.  Patient  agreeable.   2304 Radiology texted me again and states that there appears to be complete occlusion of his aortoiliac stent and left iliac limb, however chronicity is unknown.  I reexamined patient and he has strong 2+ DP and PT pulses as well as 2+ femoral pulses bilaterally so I suspect this is likely chronic occlusion with collateral flow.                               SBIRT 20yo+      Flowsheet Row Most Recent Value   Initial Alcohol Screen: US AUDIT-C     1. How often do you have a drink containing alcohol? 0 Filed at: 05/18/2024 0942   2. How many drinks containing alcohol do you have on a typical day you are drinking?  0 Filed at: 05/18/2024 0942   3b. FEMALE Any Age, or MALE 65+: How often do you have 4 or more drinks on one occassion? 0 Filed at: 05/18/2024 0942   Audit-C Score 0 Filed at: 05/18/2024 0942   EDILMA: How many times in the past year have you...    Used an illegal drug or used a prescription medication for non-medical reasons? Never Filed at: 05/18/2024 0942                      Medical Decision Making  69-year-old male with history of dementia presents to the ED for 1 week of generalized weakness, malaise, ambulatory dysfunction, poor appetite.  Patient was noted to have bloody diarrhea this morning but it is unclear how long this has been going on.  He arrives hypotensive however has minimal complaints and does not appear toxic on examination.  Nursing was able to establish IV access using ultrasound guidance.  Will start hydrating with 30 cc/kg IV fluid bolus given hypotension and possibility of infectious etiology.  Will workup with cardiac, abdominal and infectious labs, CT scan of the head due to worsening confusion, CT chest, abdomen and pelvis to assess for source of infection.  Suspect hypotension is related to poor p.o. intake however sepsis is considered.  Will check UA.  At this time no obvious source of infection so we will hold off on IV antibiotics.  If patient's chest x-ray shows  any evidence of fluid overload, given his history of CHF, will slow or decreased IV fluid bolus.  Differential for his symptoms is vast and includes electrolyte or metabolic abnormality, infectious etiology such as pneumonia, UTI, gastroenteritis or colitis, new renal insufficiency or failure, less likely acute stroke given nonfocal neurologic exam.    Amount and/or Complexity of Data Reviewed  Labs: ordered. Decision-making details documented in ED Course.  Radiology: ordered and independent interpretation performed. Decision-making details documented in ED Course.  ECG/medicine tests: ordered and independent interpretation performed. Decision-making details documented in ED Course.    Risk  Prescription drug management.  Decision regarding hospitalization.             Disposition  Final diagnoses:   ANNETTA (acute kidney injury) (HCC)   Hypotension   Failure to thrive in adult   Cancer, metastatic to liver (HCC)     Time reflects when diagnosis was documented in both MDM as applicable and the Disposition within this note       Time User Action Codes Description Comment    5/18/2024 12:52 PM Bridget Irene E Add [N17.9] ANNETTA (acute kidney injury) (HCC)     5/18/2024 12:52 PM Bridget Irene E Add [I95.9] Hypotension     5/18/2024 12:52 PM JojoeroBridget E Add [R62.7] Failure to thrive in adult     5/18/2024 12:53 PM Bridget Irene E Add [C78.7] Cancer, metastatic to liver (HCC)           ED Disposition       ED Disposition   Admit    Condition   Stable    Date/Time   Sat May 18, 2024 1253    Comment   Case was discussed with JUNITO and the patient's admission status was agreed to be Admission Status: inpatient status to the service of Dr. Morgan .               Follow-up Information    None         Current Discharge Medication List        CONTINUE these medications which have NOT CHANGED    Details   amiodarone 200 mg tablet Take 200 mg by mouth daily      aspirin (ECOTRIN LOW STRENGTH) 81 mg EC tablet Take 81 mg by mouth  daily      atorvastatin (LIPITOR) 80 mg tablet       carvedilol (COREG) 25 mg tablet       Eliquis 5 MG       Entresto 49-51 MG TABS       furosemide (LASIX) 40 mg tablet       Klor-Con M20 20 MEQ tablet       mirtazapine (REMERON) 30 mg tablet       pantoprazole (PROTONIX) 40 mg tablet       temazepam (RESTORIL) 30 mg capsule TAKE 1 TABLET AT BEDTIME WHEN NECESSARY FOR SLEEP             No discharge procedures on file.    PDMP Review       None            ED Provider  Electronically Signed by             Bridget Irene DO  05/18/24 6484

## 2024-05-18 NOTE — ASSESSMENT & PLAN NOTE
Holding home dose Lasix 40 mg daily and Coreg 25 mg twice daily entreso in setting of hypotension as above

## 2024-05-18 NOTE — ASSESSMENT & PLAN NOTE
SBP's 80s, responding to IV fluids.  Now SBP's in the 110s.  No acute source of infection at this time, unclear etiology.  Continue with IV fluids  Infectious workup as above  Hold home antihypertensive regimen  Monitor vitals closely

## 2024-05-18 NOTE — ASSESSMENT & PLAN NOTE
SBP's 80s, responding to IV fluids.  Now SBP's in the 110s.    Continue with IV fluids  Infectious workup as above  Hold home antihypertensive regimen  Monitor vitals closely

## 2024-05-18 NOTE — ASSESSMENT & PLAN NOTE
Creatinine 1.59, baseline appears to be around 1.  Suspect prerenal in setting of poor p.o. intake. Improved to 0.97  Avoid nephrotoxic agents  Monitor creatinine with a.m. BMP

## 2024-05-18 NOTE — ASSESSMENT & PLAN NOTE
As evidenced by tachycardia and leukocytosis, source of viral gastroenteritis.  Chest CT negative for acute infectious etiology.  Patient received IV ceftriaxone  S/p 30 cc/kg Iv fluids  Follow-up on UA  Continue with IV ceftriaxone and flagyl for now, consider d/c if infectious workup consistent with viral gastroenteritis  Monitor fever curve and WBC count

## 2024-05-18 NOTE — ASSESSMENT & PLAN NOTE
Multiple hepatic metastases, developing since 11/30/2023. Retroperitoneal, peripancreatic and vinicius hepatis lymphadenopathy, also new since 11/30/2023.Site of primary malignancy unknown although there is subtle suggestion of a 0.7 x 1.1 cm mass at the junction of the pancreatic head and uncinate process. This can be better evaluated with MRI of the abdomen without and with IV contrast, with MRCP  MRCP ordered  GI consulted, appreciate commendations

## 2024-05-18 NOTE — ASSESSMENT & PLAN NOTE
Follows with vascular surgery outpatient, Ct showing Aortobifemoral stent graft present with probable complete occlusion of the left iliac limb and probable occlusion of the femoral-femoral bypass graft. Per chart review and family, this is known. Peripheral pulses present.   Close monitoring  Consider inpatient vs outpatient vascular f/u

## 2024-05-18 NOTE — ASSESSMENT & PLAN NOTE
As evidenced by tachycardia and leukocytosis, Chest CT negative for acute infectious etiology. Family reports diarrhea, patient has had no BM since admission.   Patient received IV ceftriaxone  S/p 30 cc/kg Iv fluids, continue with IV fluids  1/2 blood cultures + gram positive cocci in chains  Continue with IV ceftriaxone and flagyl for now, consider d/c if infectious workup consistent with viral gastroenteritis  Monitor fever curve and WBC count

## 2024-05-18 NOTE — ASSESSMENT & PLAN NOTE
Family reports some mild baseline cognitive impairment, acutely worsened over the past 4 days.  Suspect metabolic secondary to severe sepsis, hypotension.  Per family it is slightly improving.  Alert and oriented x 2  Head CT negative for acute finding  Continue with IV antibiotics and plan as above  Monitor mental status closely

## 2024-05-18 NOTE — ASSESSMENT & PLAN NOTE
Multiple hepatic metastases, developing since 11/30/2023. Retroperitoneal, peripancreatic and vinicius hepatis lymphadenopathy, also new since 11/30/2023.Site of primary malignancy unknown although there is subtle suggestion of a 0.7 x 1.1 cm mass at the junction of the pancreatic head and uncinate process. This can be better evaluated with MRI of the abdomen without and with IV contrast, with MRCP  MRI ordered  GI consulted, appreciate recommendations   pending

## 2024-05-18 NOTE — ASSESSMENT & PLAN NOTE
Family reports some mild baseline cognitive impairment, acutely worsened over the past 4 days.  Suspect metabolic secondary to severe sepsis, hypotension.  Per family patient is now at baseline.   Head CT negative for acute finding  Continue with IV antibiotics and plan as above  Monitor mental status closely

## 2024-05-19 PROBLEM — R78.81 POSITIVE BLOOD CULTURE: Status: ACTIVE | Noted: 2024-05-19

## 2024-05-19 PROBLEM — I95.9 HYPOTENSION: Status: RESOLVED | Noted: 2024-05-18 | Resolved: 2024-05-19

## 2024-05-19 LAB
ALBUMIN SERPL BCP-MCNC: 3.2 G/DL (ref 3.5–5)
ALP SERPL-CCNC: 224 U/L (ref 34–104)
ALT SERPL W P-5'-P-CCNC: 18 U/L (ref 7–52)
ANION GAP SERPL CALCULATED.3IONS-SCNC: 8 MMOL/L (ref 4–13)
AST SERPL W P-5'-P-CCNC: 57 U/L (ref 13–39)
BASOPHILS # BLD AUTO: 0.02 THOUSANDS/ÂΜL (ref 0–0.1)
BASOPHILS NFR BLD AUTO: 0 % (ref 0–1)
BILIRUB SERPL-MCNC: 0.73 MG/DL (ref 0.2–1)
BUN SERPL-MCNC: 23 MG/DL (ref 5–25)
CALCIUM ALBUM COR SERPL-MCNC: 8.6 MG/DL (ref 8.3–10.1)
CALCIUM SERPL-MCNC: 8 MG/DL (ref 8.4–10.2)
CARDIAC TROPONIN I PNL SERPL HS: 12 NG/L
CHLORIDE SERPL-SCNC: 106 MMOL/L (ref 96–108)
CO2 SERPL-SCNC: 23 MMOL/L (ref 21–32)
CREAT SERPL-MCNC: 0.97 MG/DL (ref 0.6–1.3)
EOSINOPHIL # BLD AUTO: 0 THOUSAND/ÂΜL (ref 0–0.61)
EOSINOPHIL NFR BLD AUTO: 0 % (ref 0–6)
ERYTHROCYTE [DISTWIDTH] IN BLOOD BY AUTOMATED COUNT: 18.1 % (ref 11.6–15.1)
GFR SERPL CREATININE-BSD FRML MDRD: 79 ML/MIN/1.73SQ M
GLUCOSE SERPL-MCNC: 81 MG/DL (ref 65–140)
HCT VFR BLD AUTO: 42.9 % (ref 36.5–49.3)
HGB BLD-MCNC: 13.3 G/DL (ref 12–17)
IMM GRANULOCYTES # BLD AUTO: 0.06 THOUSAND/UL (ref 0–0.2)
IMM GRANULOCYTES NFR BLD AUTO: 1 % (ref 0–2)
LYMPHOCYTES # BLD AUTO: 0.7 THOUSANDS/ÂΜL (ref 0.6–4.47)
LYMPHOCYTES NFR BLD AUTO: 9 % (ref 14–44)
MCH RBC QN AUTO: 26.3 PG (ref 26.8–34.3)
MCHC RBC AUTO-ENTMCNC: 31 G/DL (ref 31.4–37.4)
MCV RBC AUTO: 85 FL (ref 82–98)
MONOCYTES # BLD AUTO: 0.74 THOUSAND/ÂΜL (ref 0.17–1.22)
MONOCYTES NFR BLD AUTO: 10 % (ref 4–12)
NEUTROPHILS # BLD AUTO: 6.08 THOUSANDS/ÂΜL (ref 1.85–7.62)
NEUTS SEG NFR BLD AUTO: 80 % (ref 43–75)
NRBC BLD AUTO-RTO: 0 /100 WBCS
PLATELET # BLD AUTO: 136 THOUSANDS/UL (ref 149–390)
PMV BLD AUTO: 11.5 FL (ref 8.9–12.7)
POTASSIUM SERPL-SCNC: 4.3 MMOL/L (ref 3.5–5.3)
PROT SERPL-MCNC: 6.2 G/DL (ref 6.4–8.4)
RBC # BLD AUTO: 5.05 MILLION/UL (ref 3.88–5.62)
SODIUM SERPL-SCNC: 137 MMOL/L (ref 135–147)
WBC # BLD AUTO: 7.6 THOUSAND/UL (ref 4.31–10.16)

## 2024-05-19 PROCEDURE — 80053 COMPREHEN METABOLIC PANEL: CPT | Performed by: STUDENT IN AN ORGANIZED HEALTH CARE EDUCATION/TRAINING PROGRAM

## 2024-05-19 PROCEDURE — 99232 SBSQ HOSP IP/OBS MODERATE 35: CPT | Performed by: STUDENT IN AN ORGANIZED HEALTH CARE EDUCATION/TRAINING PROGRAM

## 2024-05-19 PROCEDURE — 99223 1ST HOSP IP/OBS HIGH 75: CPT | Performed by: INTERNAL MEDICINE

## 2024-05-19 PROCEDURE — 80151 DRUG ASSAY AMIODARONE: CPT | Performed by: STUDENT IN AN ORGANIZED HEALTH CARE EDUCATION/TRAINING PROGRAM

## 2024-05-19 PROCEDURE — 85025 COMPLETE CBC W/AUTO DIFF WBC: CPT | Performed by: STUDENT IN AN ORGANIZED HEALTH CARE EDUCATION/TRAINING PROGRAM

## 2024-05-19 PROCEDURE — 86301 IMMUNOASSAY TUMOR CA 19-9: CPT | Performed by: STUDENT IN AN ORGANIZED HEALTH CARE EDUCATION/TRAINING PROGRAM

## 2024-05-19 PROCEDURE — 84484 ASSAY OF TROPONIN QUANT: CPT | Performed by: STUDENT IN AN ORGANIZED HEALTH CARE EDUCATION/TRAINING PROGRAM

## 2024-05-19 RX ORDER — AMIODARONE HYDROCHLORIDE 200 MG/1
200 TABLET ORAL
Status: DISCONTINUED | OUTPATIENT
Start: 2024-05-20 | End: 2024-05-25 | Stop reason: HOSPADM

## 2024-05-19 RX ORDER — SODIUM CHLORIDE 9 MG/ML
100 INJECTION, SOLUTION INTRAVENOUS CONTINUOUS
Status: DISCONTINUED | OUTPATIENT
Start: 2024-05-19 | End: 2024-05-19

## 2024-05-19 RX ORDER — SODIUM CHLORIDE 9 MG/ML
75 INJECTION, SOLUTION INTRAVENOUS CONTINUOUS
Status: DISPENSED | OUTPATIENT
Start: 2024-05-19 | End: 2024-05-20

## 2024-05-19 RX ADMIN — PANTOPRAZOLE SODIUM 40 MG: 40 TABLET, DELAYED RELEASE ORAL at 05:20

## 2024-05-19 RX ADMIN — APIXABAN 5 MG: 5 TABLET, FILM COATED ORAL at 16:32

## 2024-05-19 RX ADMIN — ATORVASTATIN CALCIUM 80 MG: 40 TABLET, FILM COATED ORAL at 16:32

## 2024-05-19 RX ADMIN — MIRTAZAPINE 30 MG: 15 TABLET, FILM COATED ORAL at 21:47

## 2024-05-19 RX ADMIN — SODIUM CHLORIDE 75 ML/HR: 0.9 INJECTION, SOLUTION INTRAVENOUS at 16:32

## 2024-05-19 RX ADMIN — CEFTRIAXONE SODIUM 1000 MG: 10 INJECTION, POWDER, FOR SOLUTION INTRAVENOUS at 08:12

## 2024-05-19 RX ADMIN — METRONIDAZOLE 500 MG: 500 TABLET ORAL at 21:47

## 2024-05-19 RX ADMIN — APIXABAN 5 MG: 5 TABLET, FILM COATED ORAL at 08:13

## 2024-05-19 RX ADMIN — ASPIRIN 81 MG: 81 TABLET, COATED ORAL at 08:13

## 2024-05-19 RX ADMIN — METRONIDAZOLE 500 MG: 500 TABLET ORAL at 05:20

## 2024-05-19 RX ADMIN — METRONIDAZOLE 500 MG: 500 TABLET ORAL at 16:31

## 2024-05-19 NOTE — ASSESSMENT & PLAN NOTE
1/2 blood culture + gram positive cocci, unclear if this is true but given concern for severe sepsis will consider true infection at this point  Follow up on blood cultures   Continue with IV ceftriaxone and flagyl   Monitor fever curve and wbc count  Consider ID consultation

## 2024-05-19 NOTE — PLAN OF CARE
Problem: CARDIOVASCULAR - ADULT  Goal: Maintains optimal cardiac output and hemodynamic stability  Description: INTERVENTIONS:  - Monitor I/O, vital signs and rhythm  - Monitor for S/S and trends of decreased cardiac output  - Administer and titrate ordered vasoactive medications to optimize hemodynamic stability  - Assess quality of pulses, skin color and temperature  - Assess for signs of decreased coronary artery perfusion  - Instruct patient to report change in severity of symptoms  Outcome: Progressing  Goal: Absence of cardiac dysrhythmias or at baseline rhythm  Description: INTERVENTIONS:  - Continuous cardiac monitoring, vital signs, obtain 12 lead EKG if ordered  - Administer antiarrhythmic and heart rate control medications as ordered  - Monitor electrolytes and administer replacement therapy as ordered  Outcome: Progressing     Problem: METABOLIC, FLUID AND ELECTROLYTES - ADULT  Goal: Electrolytes maintained within normal limits  Description: INTERVENTIONS:  - Monitor labs and assess patient for signs and symptoms of electrolyte imbalances  - Administer electrolyte replacement as ordered  - Monitor response to electrolyte replacements, including repeat lab results as appropriate  - Instruct patient on fluid and nutrition as appropriate  Outcome: Progressing  Goal: Fluid balance maintained  Description: INTERVENTIONS:  - Monitor labs   - Monitor I/O and WT  - Instruct patient on fluid and nutrition as appropriate  - Assess for signs & symptoms of volume excess or deficit  Outcome: Progressing     Problem: PAIN - ADULT  Goal: Verbalizes/displays adequate comfort level or baseline comfort level  Description: Interventions:  - Encourage patient to monitor pain and request assistance  - Assess pain using appropriate pain scale  - Administer analgesics based on type and severity of pain and evaluate response  - Implement non-pharmacological measures as appropriate and evaluate response  - Consider cultural  and social influences on pain and pain management  - Notify physician/advanced practitioner if interventions unsuccessful or patient reports new pain  Outcome: Progressing     Problem: INFECTION - ADULT  Goal: Absence or prevention of progression during hospitalization  Description: INTERVENTIONS:  - Assess and monitor for signs and symptoms of infection  - Monitor lab/diagnostic results  - Monitor all insertion sites, i.e. indwelling lines, tubes, and drains  - Monitor endotracheal if appropriate and nasal secretions for changes in amount and color  - [place] appropriate cooling/warming therapies per order  - Administer medications as ordered  - Instruct and encourage patient and family to use good hand hygiene technique  - Identify and instruct in appropriate isolation precautions for identified infection/condition  Outcome: Progressing  Goal: Absence of fever/infection during neutropenic period  Description: INTERVENTIONS:  - Monitor WBC    Outcome: Progressing     Problem: SAFETY ADULT  Goal: Patient will remain free of falls  Description: INTERVENTIONS:  - Educate patient/family on patient safety including physical limitations  - Instruct patient to call for assistance with activity   - Consult OT/PT to assist with strengthening/mobility   - Keep Call bell within reach  - Keep bed low and locked with side rails adjusted as appropriate  - Keep care items and personal belongings within reach  - Initiate and maintain comfort rounds  - Make Fall Risk Sign visible to staff  - Offer Toileting every 2 Hours, in advance of need  - Initiate/Maintain bed alarm  - Obtain necessary fall risk management equipment: walker  - Apply yellow socks and bracelet for high fall risk patients  - Consider moving patient to room near nurses station  Outcome: Progressing  Goal: Maintain or return to baseline ADL function  Description: INTERVENTIONS:  -  Assess patient's ability to carry out ADLs; assess patient's baseline for ADL  function and identify physical deficits which impact ability to perform ADLs (bathing, care of mouth/teeth, toileting, grooming, dressing, etc.)  - Assess/evaluate cause of self-care deficits   - Assess range of motion  - Assess patient's mobility; develop plan if impaired  - Assess patient's need for assistive devices and provide as appropriate  - Encourage maximum independence but intervene and supervise when necessary  - Involve family in performance of ADLs  - Assess for home care needs following discharge   - Consider OT consult to assist with ADL evaluation and planning for discharge  - Provide patient education as appropriate  Outcome: Progressing  Goal: Maintains/Returns to pre admission functional level  Description: INTERVENTIONS:  - Perform AM-PAC 6 Click Basic Mobility/ Daily Activity assessment daily.  - Set and communicate daily mobility goal to care team and patient/family/caregiver.   - Collaborate with rehabilitation services on mobility goals if consulted  - Perform Range of Motion 2 times a day.  - Reposition patient every 2 hours.  - Dangle patient 2 times a day  - Stand patient 2 times a day  - Ambulate patient 2 times a day  - Out of bed to chair 2 times a day   - Out of bed for meals 3 times a day  - Out of bed for toileting  - Record patient progress and toleration of activity level   Outcome: Progressing

## 2024-05-19 NOTE — CONSULTS
Consultation -  [facility]  Tom Pisano 69 y.o. male MRN: [id number]  Unit/Bed#: -01 Encounter: [id number]      Inpatient consult to gastroenterology  Consult performed by: Trav Oliva III, MD  Consult ordered by: Marge Morgan DO          Reason for Consult / Principal Problem: Hepatic masses    HPI: Tom Pisano is a 69 y.o. year old male who presented to the emergency room with 2 days of periumbilical abdominal pain.  The patient reports that prior to 2 days ago he did not have any abdominal pain he has not been having fatigue.  He has been having no abdominal symptoms over the last 2 months.  He has had no fevers chills no nausea no vomiting no abdominal pain no diarrhea or constipation or black stool or blood in the stool.  He has not lost weight.  A CT performed in the emergency room shows multiple hepatic masses.  There is also a questionable structure in the head of the pancreas.  He reports that his father did die of pancreas malignancy.  He reports having a normal colonoscopy a few years ago.    The patient presented with sepsis criteria in the emergency room with a presumptive diagnosis of viral gastroenteritis.  His symptoms are not compatible with a viral gastroenteritis.  He has been on ceftriaxone and Flagyl since admission and multiple stool studies have been ordered.    REVIEW OF SYSTEMS:     CONSTITUTIONAL: Denies any fever, chills, or rigors. Good appetite, and no recent weight loss.  HEENT: No earache or tinnitus. Denies hearing loss or visual disturbances.  CARDIOVASCULAR: No chest pain or palpitations.   RESPIRATORY: Denies any cough, hemoptysis, shortness of breath or dyspnea on exertion.  GASTROINTESTINAL: As noted in the History of Present Illness.   GENITOURINARY: No problems with urination. Denies any hematuria or dysuria.  NEUROLOGIC: No dizziness or vertigo, denies headaches.   MUSCULOSKELETAL: Denies any muscle or joint pain.   SKIN:  Denies skin rashes or itching.   ENDOCRINE: Denies excessive thirst. Denies intolerance to heat or cold.  PSYCHOSOCIAL: Denies depression or anxiety. Denies any recent memory loss.     Historical Information   Past Medical History:   Diagnosis Date    CHF (congestive heart failure) (HCC)     Dementia (HCC)     DVT (deep venous thrombosis) (HCC)     lt leg    Hypertension      Past Surgical History:   Procedure Laterality Date    ABDOMINAL AORTIC ANEURYSM REPAIR, OPEN      repaired x2     Social History   Social History     Substance and Sexual Activity   Alcohol Use Never     Social History     Substance and Sexual Activity   Drug Use Never     Social History     Tobacco Use   Smoking Status Former    Current packs/day: 0.00    Types: Cigarettes    Quit date:     Years since quittin.4   Smokeless Tobacco Never     Family History   Problem Relation Age of Onset    Heart disease Mother     Cancer Father     Heart disease Father     Heart disease Brother        Meds/Allergies       Medications Prior to Admission:     amiodarone 200 mg tablet    aspirin (ECOTRIN LOW STRENGTH) 81 mg EC tablet    atorvastatin (LIPITOR) 80 mg tablet    carvedilol (COREG) 25 mg tablet    Eliquis 5 MG    Entresto 49-51 MG TABS    furosemide (LASIX) 40 mg tablet    Klor-Con M20 20 MEQ tablet    mirtazapine (REMERON) 30 mg tablet    pantoprazole (PROTONIX) 40 mg tablet    temazepam (RESTORIL) 30 mg capsule  Current Facility-Administered Medications   Medication Dose Route Frequency    acetaminophen (TYLENOL) tablet 650 mg  650 mg Oral Q6H PRN    apixaban (ELIQUIS) tablet 5 mg  5 mg Oral BID    aspirin (ECOTRIN LOW STRENGTH) EC tablet 81 mg  81 mg Oral Daily    atorvastatin (LIPITOR) tablet 80 mg  80 mg Oral Daily With Dinner    cefTRIAXone (ROCEPHIN) 1,000 mg in dextrose 5 % 50 mL IVPB  1,000 mg Intravenous Q24H    metroNIDAZOLE (FLAGYL) tablet 500 mg  500 mg Oral Q8H LOREN    mirtazapine (REMERON) tablet 30 mg  30 mg Oral HS     multi-electrolyte (PLASMALYTE-A/ISOLYTE-S PH 7.4) IV solution  100 mL/hr Intravenous Continuous    pantoprazole (PROTONIX) EC tablet 40 mg  40 mg Oral Early Morning    temazepam (RESTORIL) capsule 15 mg  15 mg Oral HS PRN       Allergies   Allergen Reactions    Shellfish-Derived Products - Food Allergy Hives       Objective   Blood pressure 126/78, pulse 71, temperature 97.7 °F (36.5 °C), resp. rate 16, height 6' (1.829 m), weight 84.5 kg (186 lb 4.6 oz), SpO2 93%.    Intake/Output Summary (Last 24 hours) at 5/19/2024 0737  Last data filed at 5/19/2024 0601  Gross per 24 hour   Intake 1611.67 ml   Output 900 ml   Net 711.67 ml         PHYSICAL EXAM:      General Appearance:   Alert, cooperative, no distress, appears stated age    HEENT:   Normocephalic, atraumatic, anicteric.     Neck:  Supple, symmetrical, trachea midline, no adenopathy;    thyroid: no enlargement/tenderness/nodules; no carotid  bruit or JVD    Lungs:   Clear to auscultation bilaterally; no rales, rhonchi or wheezing; respirations unlabored    Heart::   S1 and S2 normal; regular rate and rhythm; no murmur, rub, or gallop.   Abdomen:   Soft, non-tender, non-distended; normal bowel sounds; no masses, no organomegaly    Genitalia:   Deferred    Rectal:   Deferred    Extremities:  No cyanosis, clubbing or edema    Pulses:  2+ and symmetric all extremities    Skin:  Skin color, texture, turgor normal, no rashes or lesions    Lymph nodes:  No palpable cervical, axillary or inguinal lymphadenopathy        Lab Results:   Admission on 05/18/2024   Component Date Value    POC Glucose 05/18/2024 108     WBC 05/18/2024 12.72 (H)     RBC 05/18/2024 5.58     Hemoglobin 05/18/2024 14.5     Hematocrit 05/18/2024 47.0     MCV 05/18/2024 84     MCH 05/18/2024 26.0 (L)     MCHC 05/18/2024 30.9 (L)     RDW 05/18/2024 17.9 (H)     MPV 05/18/2024 11.6     Platelets 05/18/2024 187     nRBC 05/18/2024 0     Segmented % 05/18/2024 82 (H)     Immature Grans % 05/18/2024 1      Lymphocytes % 05/18/2024 7 (L)     Monocytes % 05/18/2024 10     Eosinophils Relative 05/18/2024 0     Basophils Relative 05/18/2024 0     Absolute Neutrophils 05/18/2024 10.44 (H)     Absolute Immature Grans 05/18/2024 0.10     Absolute Lymphocytes 05/18/2024 0.86     Absolute Monocytes 05/18/2024 1.30 (H)     Eosinophils Absolute 05/18/2024 0.01     Basophils Absolute 05/18/2024 0.01     Protime 05/18/2024 18.4 (H)     INR 05/18/2024 1.45 (H)     PTT 05/18/2024 35     SARS-CoV-2 05/18/2024 Negative     INFLUENZA A PCR 05/18/2024 Negative     INFLUENZA B PCR 05/18/2024 Negative     RSV PCR 05/18/2024 Negative     Blood Culture 05/18/2024 Received in Microbiology Lab. Culture in Progress.     Blood Culture 05/18/2024 Received in Microbiology Lab. Culture in Progress.     Color, UA 05/18/2024 Light Yellow     Clarity, UA 05/18/2024 Clear     Specific Gravity, UA 05/18/2024 1.048 (H)     pH, UA 05/18/2024 5.0     Leukocytes, UA 05/18/2024 Negative     Nitrite, UA 05/18/2024 Negative     Protein, UA 05/18/2024 Negative     Glucose, UA 05/18/2024 Negative     Ketones, UA 05/18/2024 Negative     Urobilinogen, UA 05/18/2024 <2.0     Bilirubin, UA 05/18/2024 Negative     Occult Blood, UA 05/18/2024 Negative     Sodium 05/18/2024 134 (L)     Potassium 05/18/2024 5.3     Chloride 05/18/2024 100     CO2 05/18/2024 24     ANION GAP 05/18/2024 10     BUN 05/18/2024 41 (H)     Creatinine 05/18/2024 1.59 (H)     Glucose 05/18/2024 90     Calcium 05/18/2024 8.8     eGFR 05/18/2024 43     Total Bilirubin 05/18/2024 0.83     Bilirubin, Direct 05/18/2024 0.15     Alkaline Phosphatase 05/18/2024 261 (H)     AST 05/18/2024 75 (H)     ALT 05/18/2024 27     Total Protein 05/18/2024 7.2     Albumin 05/18/2024 3.4 (L)     Magnesium 05/18/2024 2.3     BNP 05/18/2024 77     hs TnI 0hr 05/18/2024 8     LACTIC ACID 05/18/2024 1.9     TSH 3RD GENERATON 05/18/2024 4.188     Procalcitonin 05/18/2024 1.26 (H)     Lipase 05/18/2024 8 (L)      Ammonia 05/18/2024 17 (L)     Ventricular Rate 05/18/2024 70     Atrial Rate 05/18/2024 70     SD Interval 05/18/2024 186     QRSD Interval 05/18/2024 94     QT Interval 05/18/2024 398     QTC Interval 05/18/2024 429     P Axis 05/18/2024 68     QRS Axis 05/18/2024 -70     T Wave [place] 05/18/2024 21     hs TnI 2hr 05/18/2024 7     Delta 2hr hsTnI 05/18/2024 -1     Sodium 05/19/2024 137     Potassium 05/19/2024 4.3     Chloride 05/19/2024 106     CO2 05/19/2024 23     ANION GAP 05/19/2024 8     BUN 05/19/2024 23     Creatinine 05/19/2024 0.97     Glucose 05/19/2024 81     Calcium 05/19/2024 8.0 (L)     Corrected Calcium 05/19/2024 8.6     AST 05/19/2024 57 (H)     ALT 05/19/2024 18     Alkaline Phosphatase 05/19/2024 224 (H)     Total Protein 05/19/2024 6.2 (L)     Albumin 05/19/2024 3.2 (L)     Total Bilirubin 05/19/2024 0.73     eGFR 05/19/2024 79     WBC 05/19/2024 7.60     RBC 05/19/2024 5.05     Hemoglobin 05/19/2024 13.3     Hematocrit 05/19/2024 42.9     MCV 05/19/2024 85     MCH 05/19/2024 26.3 (L)     MCHC 05/19/2024 31.0 (L)     RDW 05/19/2024 18.1 (H)     MPV 05/19/2024 11.5     Platelets 05/19/2024 136 (L)     nRBC 05/19/2024 0     Segmented % 05/19/2024 80 (H)     Immature Grans % 05/19/2024 1     Lymphocytes % 05/19/2024 9 (L)     Monocytes % 05/19/2024 10     Eosinophils Relative 05/19/2024 0     Basophils Relative 05/19/2024 0     Absolute Neutrophils 05/19/2024 6.08     Absolute Immature Grans 05/19/2024 0.06     Absolute Lymphocytes 05/19/2024 0.70     Absolute Monocytes 05/19/2024 0.74     Eosinophils Absolute 05/19/2024 0.00     Basophils Absolute 05/19/2024 0.02        Imaging Studies: I have personally reviewed pertinent imaging studies.    A CT of the chest abdomen pelvis with contrast was performed in the emergency room and showed advanced emphysema with multiple hepatic metastases throughout the liver.  There is retroperitoneal peripancreatic and vinicius hepatitis lymphadenopathy.  It says that  the site of the primary malignancy is unknown although there is a subtle suggestion of a structure in the pancreas head.  I personally reviewed these images.    ASSESSMENT & PLAN:  Principal Problem:    Hypotension  Active Problems:    Status post femorofemoral bypass surgery    Hypertension    Acute kidney injury (HCC)    Chronic systolic heart failure (HCC)    Metabolic encephalopathy    PAD (peripheral artery disease) (HCC)    Abnormal abdominal CT scan    Severe sepsis (HCC)    Atrial fibrillation (HCC)      He is a 69-year-old male with 2 days of abdominal pain.  His CT reveals multiple hepatic metastases of unclear etiology.  There is a questionable structure in the head of the pancreas.  His father  of pancreas cancer.  The patient denies weight loss or abdominal pain prior to 2 days ago.    In addition he presented with SIRS of unclear etiology in the emergency room and is on ceftriaxone and Flagyl since admission    I have ordered an MRI with contrast MRCP imaging  He may need an endoscopy colonoscopy if the MRI is negative.  Treatment and evaluation of SIRS per [facility] service  He is currently on ceftriaxone and Flagyl  He has stool studies ordered but he has not had any diarrhea   Blood cultures and urine cultures are pending  Monitor LFTs.  He has has a slight elevation of his AST and alkaline phosphatase on admission.  This may be due to the hepatic metastases or infection  A CA 19-9 has been ordered.    Trav Oliva III, MD  2024,7:37 AM

## 2024-05-19 NOTE — PROGRESS NOTES
[facility]  Progress Note  Name: Tom Pisano I  MRN: [id number]  Unit/Bed#: -01 I Date of Admission: 5/18/2024   Date of Service: 5/19/2024 I Hospital Day: 1    Assessment & Plan   Positive blood culture  Assessment & Plan  1/2 blood culture + gram positive cocci, unclear if this is true but given concern for severe sepsis will consider true infection at this point  Follow up on blood cultures   Continue with IV ceftriaxone and flagyl   Monitor fever curve and wbc count  Consider ID consultation    Atrial fibrillation (HCC)  Assessment & Plan  Hx of a fib on eliquis and amiodarone daily  EKG with NSR and PAC  Given that blood pressure is stable  Will resume po amiodarone        Severe sepsis (HCC)  Assessment & Plan  As evidenced by tachycardia and leukocytosis, Chest CT negative for acute infectious etiology. Family reports diarrhea, patient has had no BM since admission.   Patient received IV ceftriaxone  S/p 30 cc/kg Iv fluids, continue with IV fluids  1/2 blood cultures + gram positive cocci in chains  Continue with IV ceftriaxone and flagyl for now, consider d/c if infectious workup consistent with viral gastroenteritis  Monitor fever curve and WBC count    Abnormal abdominal CT scan  Assessment & Plan  Multiple hepatic metastases, developing since 11/30/2023. Retroperitoneal, peripancreatic and vinicius hepatis lymphadenopathy, also new since 11/30/2023.Site of primary malignancy unknown although there is subtle suggestion of a 0.7 x 1.1 cm mass at the junction of the pancreatic head and uncinate process. This can be better evaluated with MRI of the abdomen without and with IV contrast, with MRCP  MRI ordered  GI consulted, appreciate recommendations   pending    PAD (peripheral artery disease) (HCC)  Assessment & Plan  History of CAD, PAD, CVA currently on aspirin Eliquis and statin    Metabolic encephalopathy  Assessment & Plan  Family reports some mild baseline  cognitive impairment, acutely worsened over the past 4 days.  Suspect metabolic secondary to severe sepsis, hypotension.  Per family patient is now at baseline.   Head CT negative for acute finding  Continue with IV antibiotics and plan as above  Monitor mental status closely    Chronic systolic heart failure (HCC)  Assessment & Plan  Wt Readings from Last 3 Encounters:   05/18/24 84.5 kg (186 lb 4.6 oz)   12/04/23 83.5 kg (184 lb)   08/29/23 87.1 kg (192 lb)   Patient appears volume depleted on exam  Holding home dose Lasix and antihypertensive regimen in setting of hypotension  Continue with gentle IV fluids            Acute kidney injury (HCC)  Assessment & Plan  Creatinine 1.59, baseline appears to be around 1.  Suspect prerenal in setting of poor p.o. intake. Improved to 0.97  Avoid nephrotoxic agents  Monitor creatinine with a.m. BMP    Hypertension  Assessment & Plan  Holding home dose Lasix 40 mg daily and Coreg 25 mg twice daily entreso in setting of hypotension as above    Status post femorofemoral bypass surgery  Assessment & Plan  Follows with vascular surgery outpatient, Ct showing Aortobifemoral stent graft present with probable complete occlusion of the left iliac limb and probable occlusion of the femoral-femoral bypass graft. Per chart review and family, this is known. Peripheral pulses present.   Close monitoring  Consider inpatient vs outpatient vascular f/u     * Hypotension-resolved as of 5/19/2024  Assessment & Plan  SBP's 80s, responding to IV fluids.  Now SBP's in the 110s.    Continue with IV fluids  Infectious workup as above  Hold home antihypertensive regimen  Monitor vitals closely             VTE Pharmacologic Prophylaxis: VTE Score: 2 Low Risk (Score 0-2) - Encourage Ambulation.    Mobility:   Basic Mobility Inpatient Raw Score: 16  JH-HLM Goal: 5: Stand one or more mins  JH-HLM Achieved: 5: Stand (1 or more minutes)  JH-HLM Goal achieved. Continue to encourage appropriate  mobility.    Patient Centered Rounds: I performed bedside rounds with nursing staff today.   Discussions with Specialists or Other Care Team Provider: JAMIL    Education and Discussions with Family / Patient:  PT.     Total Time Spent on Date of Encounter in care of patient: 60 mins. This time was spent on one or more of the following: performing physical exam; counseling and coordination of care; obtaining or reviewing history; documenting in the medical record; reviewing/ordering tests, medications or procedures; communicating with other healthcare professionals and discussing with patient's family/caregivers.    Current Length of Stay: 1 day(s)  Current Patient Status: Inpatient   Certification Statement: The patient will continue to require additional inpatient hospital stay due to sepsis  Discharge Plan: Anticipate discharge in 48 hrs to discharge location to be determined pending rehab evaluations.    Code Status: Level 1 - Full Code    Subjective:   Pt reports feeling better today. Still with intermittent chills. Denies nausea, vomiting. No BM since admission.    Objective:     Vitals:   Temp (24hrs), Av.9 °F (36.6 °C), Min:97.6 °F (36.4 °C), Max:98.5 °F (36.9 °C)    Temp:  [97.6 °F (36.4 °C)-98.5 °F (36.9 °C)] 97.7 °F (36.5 °C)  HR:  [71-96] 71  Resp:  [16] 16  BP: ()/(50-78) 126/78  SpO2:  [75 %-96 %] 93 %  Body mass index is 25.27 kg/m².     Input and Output Summary (last 24 hours):     Intake/Output Summary (Last 24 hours) at 2024 1508  Last data filed at 2024 0601  Gross per 24 hour   Intake 1611.67 ml   Output 900 ml   Net 711.67 ml       Physical Exam:   Physical Exam  Constitutional:       General: He is not in acute distress.     Appearance: He is well-developed. He is not diaphoretic.   HENT:      Head: Normocephalic and atraumatic.      Mouth/Throat:      Pharynx: No oropharyngeal exudate.   Eyes:      General: No scleral icterus.     Extraocular Movements: Extraocular movements  intact.      Conjunctiva/sclera: Conjunctivae normal.   Neck:      Vascular: No JVD.      Trachea: No tracheal deviation.   Cardiovascular:      Rate and Rhythm: Normal rate and regular rhythm.      Heart sounds: No murmur heard.     No friction rub. No gallop.   Pulmonary:      Effort: Pulmonary effort is normal. No respiratory distress.      Breath sounds: No stridor. No wheezing.   Abdominal:      General: There is no distension.      Palpations: Abdomen is soft. There is no mass.      Tenderness: There is no abdominal tenderness. There is no right CVA tenderness or left CVA tenderness.   Musculoskeletal:         General: No tenderness. Normal range of motion.      Right lower leg: No edema.      Left lower leg: No edema.   Skin:     General: Skin is warm.      Coloration: Skin is pale.      Findings: No erythema.   Neurological:      Mental Status: He is oriented to person, place, and time.   Psychiatric:         Behavior: Behavior normal.         Thought Content: Thought content normal.          Additional Data:     Labs:  Results from last 7 days   Lab Units 05/19/24  0558   WBC Thousand/uL 7.60   HEMOGLOBIN g/dL 13.3   HEMATOCRIT % 42.9   PLATELETS Thousands/uL 136*   SEGS PCT % 80*   LYMPHO PCT % 9*   MONO PCT % 10   EOS PCT % 0     Results from last 7 days   Lab Units 05/19/24  0558   SODIUM mmol/L 137   POTASSIUM mmol/L 4.3   CHLORIDE mmol/L 106   CO2 mmol/L 23   BUN mg/dL 23   CREATININE mg/dL 0.97   ANION GAP mmol/L 8   CALCIUM mg/dL 8.0*   ALBUMIN g/dL 3.2*   TOTAL BILIRUBIN mg/dL 0.73   ALK PHOS U/L 224*   ALT U/L 18   AST U/L 57*   GLUCOSE RANDOM mg/dL 81     Results from last 7 days   Lab Units 05/18/24  0954   INR  1.45*     Results from last 7 days   Lab Units 05/18/24  0941   POC GLUCOSE mg/dl 108         Results from last 7 days   Lab Units 05/18/24  0954   LACTIC ACID mmol/L 1.9   PROCALCITONIN ng/ml 1.26*       Lines/Drains:  Invasive Devices       Peripheral Intravenous Line  Duration              Peripheral IV 05/18/24 Left Antecubital 1 day    Peripheral IV 05/18/24 Right;Ventral (anterior) Forearm 1 day                          Imaging: No pertinent imaging reviewed.    Recent Cultures (last 7 days):   Results from last 7 days   Lab Units 05/18/24  0955 05/18/24  0954   BLOOD CULTURE  Received in Microbiology Lab. Culture in Progress.  --    GRAM STAIN RESULT   --  Gram positive cocci in chains*       Last 24 Hours Medication List:   Current Facility-Administered Medications   Medication Dose Route Frequency Provider Last Rate    acetaminophen  650 mg Oral Q6H PRN Margewanda Morgan, DO      [START ON 5/20/2024] amiodarone  200 mg Oral Daily With Breakfast [facility]      apixaban  5 mg Oral BID [facility], DO      aspirin  81 mg Oral Daily [facility], [facility]      atorvastatin  80 mg Oral Daily With Dinner [facility]      cefTRIAXone  1,000 mg Intravenous Q24H [facility], DO 1,000 mg (05/19/24 0812)    metroNIDAZOLE  500 mg Oral Q8H LOREN [facility], DO      mirtazapine  30 mg Oral HS [facility], DO      pantoprazole  40 mg Oral Early Morning [facility], DO      sodium chloride  100 mL/hr Intravenous Continuous [facility] Lanya, DO      temazepam  15 mg Oral HS PRN [facility] Morgan, DO          Today, Patient Was Seen By: Marge Morgan DO    **Please Note: This note may have been constructed using a voice recognition system.**

## 2024-05-19 NOTE — PLAN OF CARE
Problem: CARDIOVASCULAR - ADULT  Goal: Maintains optimal cardiac output and hemodynamic stability  Description: INTERVENTIONS:  - Monitor I/O, vital signs and rhythm  - Monitor for S/S and trends of decreased cardiac output  - Administer and titrate ordered vasoactive medications to optimize hemodynamic stability  - Assess quality of pulses, skin color and temperature  - Assess for signs of decreased coronary artery perfusion  - Instruct patient to report change in severity of symptoms  Outcome: Progressing  Goal: Absence of cardiac dysrhythmias or at baseline rhythm  Description: INTERVENTIONS:  - Continuous cardiac monitoring, vital signs, obtain 12 lead EKG if ordered  - Administer antiarrhythmic and heart rate control medications as ordered  - Monitor electrolytes and administer replacement therapy as ordered  Outcome: Progressing     Problem: METABOLIC, FLUID AND ELECTROLYTES - ADULT  Goal: Electrolytes maintained within normal limits  Description: INTERVENTIONS:  - Monitor labs and assess patient for signs and symptoms of electrolyte imbalances  - Administer electrolyte replacement as ordered  - Monitor response to electrolyte replacements, including repeat lab results as appropriate  - Instruct patient on fluid and nutrition as appropriate  Outcome: Progressing  Goal: Fluid balance maintained  Description: INTERVENTIONS:  - Monitor labs   - Monitor I/O and WT  - Instruct patient on fluid and nutrition as appropriate  - Assess for signs & symptoms of volume excess or deficit  Outcome: Progressing     Problem: PAIN - ADULT  Goal: Verbalizes/displays adequate comfort level or baseline comfort level  Description: Interventions:  - Encourage patient to monitor pain and request assistance  - Assess pain using appropriate pain scale  - Administer analgesics based on type and severity of pain and evaluate response  - Implement non-pharmacological measures as appropriate and evaluate response  - Consider cultural  and social influences on pain and pain management  - Notify physician/advanced practitioner if interventions unsuccessful or patient reports new pain  Outcome: Progressing     Problem: INFECTION - ADULT  Goal: Absence or prevention of progression during hospitalization  Description: INTERVENTIONS:  - Assess and monitor for signs and symptoms of infection  - Monitor lab/diagnostic results  - Monitor all insertion sites, i.e. indwelling lines, tubes, and drains  - Monitor endotracheal if appropriate and nasal secretions for changes in amount and color  - [place] appropriate cooling/warming therapies per order  - Administer medications as ordered  - Instruct and encourage patient and family to use good hand hygiene technique  - Identify and instruct in appropriate isolation precautions for identified infection/condition  Outcome: Progressing  Goal: Absence of fever/infection during neutropenic period  Description: INTERVENTIONS:  - Monitor WBC    Outcome: Progressing     Problem: SAFETY ADULT  Goal: Patient will remain free of falls  Description: INTERVENTIONS:  - Educate patient/family on patient safety including physical limitations  - Instruct patient to call for assistance with activity   - Consult OT/PT to assist with strengthening/mobility   - Keep Call bell within reach  - Keep bed low and locked with side rails adjusted as appropriate  - Keep care items and personal belongings within reach  - Initiate and maintain comfort rounds  - Make Fall Risk Sign visible to staff  - Offer Toileting every 2 Hours, in advance of need  - Initiate/Maintain bed alarm  - Obtain necessary fall risk management equipment: chair alarm  - Apply yellow socks and bracelet for high fall risk patients  - Consider moving patient to room near nurses station  Outcome: Progressing  Goal: Maintain or return to baseline ADL function  Description: INTERVENTIONS:  -  Assess patient's ability to carry out ADLs; assess patient's baseline for ADL  function and identify physical deficits which impact ability to perform ADLs (bathing, care of mouth/teeth, toileting, grooming, dressing, etc.)  - Assess/evaluate cause of self-care deficits   - Assess range of motion  - Assess patient's mobility; develop plan if impaired  - Assess patient's need for assistive devices and provide as appropriate  - Encourage maximum independence but intervene and supervise when necessary  - Involve family in performance of ADLs  - Assess for home care needs following discharge   - Consider OT consult to assist with ADL evaluation and planning for discharge  - Provide patient education as appropriate  Outcome: Progressing  Goal: Maintains/Returns to pre admission functional level  Description: INTERVENTIONS:  - Perform AM-PAC 6 Click Basic Mobility/ Daily Activity assessment daily.  - Set and communicate daily mobility goal to care team and patient/family/caregiver.   - Collaborate with rehabilitation services on mobility goals if consulted  - Perform Range of Motion 3 times a day.  - Reposition patient every 3 hours.  - Dangle patient 3 times a day  - Stand patient 3 times a day  - Ambulate patient 3 times a day  - Out of bed to chair 3 times a day   - Out of bed for meals 3 times a day  - Out of bed for toileting  - Record patient progress and toleration of activity level   Outcome: Progressing     Problem: DISCHARGE PLANNING  Goal: Discharge to home or other facility with appropriate resources  Description: INTERVENTIONS:  - Identify barriers to discharge w/patient and caregiver  - Arrange for needed discharge resources and transportation as appropriate  - Identify discharge learning needs (meds, wound care, etc.)  - Arrange for interpretive services to assist at discharge as needed  - Refer to Case Management Department for coordinating discharge planning if the patient needs post-hospital services based on physician/advanced practitioner order or complex needs related to  functional status, cognitive ability, or social support system  Outcome: Progressing     Problem: Knowledge Deficit  Goal: Patient/family/caregiver demonstrates understanding of disease process, treatment plan, medications, and discharge instructions  Description: Complete learning assessment and assess knowledge base.  Interventions:  - Provide teaching at level of understanding  - Provide teaching via preferred learning methods  Outcome: Progressing     Problem: Prexisting or High Potential for Compromised Skin Integrity  Goal: Skin integrity is maintained or improved  Description: INTERVENTIONS:  - Identify patients at risk for skin breakdown  - Assess and monitor skin integrity  - Assess and monitor nutrition and hydration status  - Monitor labs   - Assess for incontinence   - Turn and reposition patient  - Assist with mobility/ambulation  - Relieve pressure over bony prominences  - Avoid friction and shearing  - Provide appropriate hygiene as needed including keeping skin clean and dry  - Evaluate need for skin moisturizer/barrier cream  - Collaborate with interdisciplinary team   - Patient/family teaching  - Consider wound care consult   Outcome: Progressing

## 2024-05-20 ENCOUNTER — TELEPHONE (OUTPATIENT)
Age: 69
End: 2024-05-20

## 2024-05-20 LAB
ALBUMIN SERPL BCP-MCNC: 2.7 G/DL (ref 3.5–5)
ALP SERPL-CCNC: 220 U/L (ref 34–104)
ALT SERPL W P-5'-P-CCNC: 15 U/L (ref 7–52)
ANION GAP SERPL CALCULATED.3IONS-SCNC: 8 MMOL/L (ref 4–13)
APTT PPP: 104 SECONDS (ref 23–37)
APTT PPP: 43 SECONDS (ref 23–37)
AST SERPL W P-5'-P-CCNC: 51 U/L (ref 13–39)
BILIRUB SERPL-MCNC: 0.71 MG/DL (ref 0.2–1)
BUN SERPL-MCNC: 16 MG/DL (ref 5–25)
CALCIUM ALBUM COR SERPL-MCNC: 8.5 MG/DL (ref 8.3–10.1)
CALCIUM SERPL-MCNC: 7.5 MG/DL (ref 8.4–10.2)
CHLORIDE SERPL-SCNC: 107 MMOL/L (ref 96–108)
CO2 SERPL-SCNC: 21 MMOL/L (ref 21–32)
CREAT SERPL-MCNC: 0.75 MG/DL (ref 0.6–1.3)
ERYTHROCYTE [DISTWIDTH] IN BLOOD BY AUTOMATED COUNT: 17.9 % (ref 11.6–15.1)
GFR SERPL CREATININE-BSD FRML MDRD: 93 ML/MIN/1.73SQ M
GLUCOSE SERPL-MCNC: 72 MG/DL (ref 65–140)
HCT VFR BLD AUTO: 38 % (ref 36.5–49.3)
HGB BLD-MCNC: 12 G/DL (ref 12–17)
INR PPP: 1.72 (ref 0.84–1.19)
MCH RBC QN AUTO: 26.3 PG (ref 26.8–34.3)
MCHC RBC AUTO-ENTMCNC: 31.6 G/DL (ref 31.4–37.4)
MCV RBC AUTO: 83 FL (ref 82–98)
PLATELET # BLD AUTO: 138 THOUSANDS/UL (ref 149–390)
PMV BLD AUTO: 11.1 FL (ref 8.9–12.7)
POTASSIUM SERPL-SCNC: 3.9 MMOL/L (ref 3.5–5.3)
PROT SERPL-MCNC: 5.4 G/DL (ref 6.4–8.4)
PROTHROMBIN TIME: 20.9 SECONDS (ref 11.6–14.5)
RBC # BLD AUTO: 4.57 MILLION/UL (ref 3.88–5.62)
SODIUM SERPL-SCNC: 136 MMOL/L (ref 135–147)
WBC # BLD AUTO: 5.95 THOUSAND/UL (ref 4.31–10.16)

## 2024-05-20 PROCEDURE — 85730 THROMBOPLASTIN TIME PARTIAL: CPT | Performed by: STUDENT IN AN ORGANIZED HEALTH CARE EDUCATION/TRAINING PROGRAM

## 2024-05-20 PROCEDURE — 85610 PROTHROMBIN TIME: CPT | Performed by: STUDENT IN AN ORGANIZED HEALTH CARE EDUCATION/TRAINING PROGRAM

## 2024-05-20 PROCEDURE — 99232 SBSQ HOSP IP/OBS MODERATE 35: CPT | Performed by: STUDENT IN AN ORGANIZED HEALTH CARE EDUCATION/TRAINING PROGRAM

## 2024-05-20 PROCEDURE — 80053 COMPREHEN METABOLIC PANEL: CPT | Performed by: STUDENT IN AN ORGANIZED HEALTH CARE EDUCATION/TRAINING PROGRAM

## 2024-05-20 PROCEDURE — 97163 PT EVAL HIGH COMPLEX 45 MIN: CPT

## 2024-05-20 PROCEDURE — 85027 COMPLETE CBC AUTOMATED: CPT | Performed by: STUDENT IN AN ORGANIZED HEALTH CARE EDUCATION/TRAINING PROGRAM

## 2024-05-20 PROCEDURE — 99232 SBSQ HOSP IP/OBS MODERATE 35: CPT | Performed by: INTERNAL MEDICINE

## 2024-05-20 PROCEDURE — 97167 OT EVAL HIGH COMPLEX 60 MIN: CPT

## 2024-05-20 PROCEDURE — 97110 THERAPEUTIC EXERCISES: CPT

## 2024-05-20 RX ORDER — HEPARIN SODIUM 10000 [USP'U]/100ML
3-20 INJECTION, SOLUTION INTRAVENOUS
Status: DISCONTINUED | OUTPATIENT
Start: 2024-05-20 | End: 2024-05-23

## 2024-05-20 RX ADMIN — AMIODARONE HYDROCHLORIDE 200 MG: 200 TABLET ORAL at 09:11

## 2024-05-20 RX ADMIN — METRONIDAZOLE 500 MG: 500 TABLET ORAL at 05:51

## 2024-05-20 RX ADMIN — METRONIDAZOLE 500 MG: 500 TABLET ORAL at 21:37

## 2024-05-20 RX ADMIN — PANTOPRAZOLE SODIUM 40 MG: 40 TABLET, DELAYED RELEASE ORAL at 05:51

## 2024-05-20 RX ADMIN — METRONIDAZOLE 500 MG: 500 TABLET ORAL at 16:18

## 2024-05-20 RX ADMIN — ASPIRIN 81 MG: 81 TABLET, COATED ORAL at 09:11

## 2024-05-20 RX ADMIN — ATORVASTATIN CALCIUM 80 MG: 40 TABLET, FILM COATED ORAL at 16:18

## 2024-05-20 RX ADMIN — MIRTAZAPINE 30 MG: 15 TABLET, FILM COATED ORAL at 21:37

## 2024-05-20 RX ADMIN — APIXABAN 5 MG: 5 TABLET, FILM COATED ORAL at 09:11

## 2024-05-20 RX ADMIN — HEPARIN SODIUM 12 UNITS/KG/HR: 10000 INJECTION, SOLUTION INTRAVENOUS at 14:37

## 2024-05-20 RX ADMIN — CEFTRIAXONE SODIUM 1000 MG: 10 INJECTION, POWDER, FOR SOLUTION INTRAVENOUS at 09:12

## 2024-05-20 NOTE — PLAN OF CARE
Problem: OCCUPATIONAL THERAPY ADULT  Goal: Performs self-care activities at highest level of function for planned discharge setting.  See evaluation for individualized goals.  Description: Treatment Interventions: ADL retraining, Functional transfer training, UE strengthening/ROM, Endurance training, Patient/family training, Equipment evaluation/education, Compensatory technique education, Continued evaluation, Activityengagement, Energy conservation          See flowsheet documentation for full assessment, interventions and recommendations.   5/20/2024 1454 by Basia Murray OT  Note: Limitation: Decreased ADL status, Decreased Safe judgement during ADL, Decreased cognition, Decreased endurance, Decreased self-care trans, Decreased high-level ADLs  Prognosis: Good  Assessment: Patient is a 69 y.o. male seen for OT evaluation s/p admit to [facility] on 5/18/2024 w/Hypotension. Commorbidities affecting patient's functional performance at time of assessment include: history of femorofemoral bypass surgery, hypertension, ANNETTA, chronic systolic heart failure, metabolic encephalopathy, PAD, abnormal abdominal CT scan, severe sepsis, atrial fibrillation, and positive blood culture.   Orders placed for OT evaluation and treatment.  Performed at least two patient identifiers during session including name and wristband.  Prior to admission, Patient and Sister reported, he lives alone in a one story apartment, no BRI. Patient ambulates without AD, has HHA- M-F, 8am-5pm . Personal factors affecting patient at time of initial evaluation include: limited caregiver support, limited insight into deficits, decreased initiation and engagement, difficulty performing ADLs, and difficulty performing IADLs. Upon evaluation, patient requires minimal  assist for UB ADLs, moderate and maximal assist for LB ADLs.  Occupational performance is affected by the following deficits: orientation, degenerative arthritic  joint changes, impaired gross motor coordination, decreased activity tolerance, impaired problem solving, and decreased safety awareness.  Patient to benefit from continued Occupational Therapy treatment while in the hospital to address deficits as defined above and maximize level of functional independence with ADLs and functional mobility. Occupational Performance areas to address include: bathing/ shower, dressing, toilet hygiene, transfer to all surfaces, functional ambulation, functional mobility, health maintenance, medication routine/ management, IADLs: safety procedures, IADLs: meal prep/ clean up, and Leisure Participation. From OT standpoint, recommendation at time of d/c would be Level 2.     Rehab Resource Intensity Level, OT: II (Moderate Resource Intensity)       5/20/2024 1454 by Basia Murray OT  Note: Limitation: Decreased ADL status, Decreased Safe judgement during ADL, Decreased cognition, Decreased endurance, Decreased self-care trans, Decreased high-level ADLs  Prognosis: Good  Assessment: Patient is a 69 y.o. male seen for OT evaluation s/p admit to [facility] on [facility]/18/2024 w/Hypotension. Commorbidities affecting patient's functional performance at time of assessment include: history of femorofemoral bypass surgery, hypertension, ANNETTA, chronic systolic heart failure, metabolic encephalopathy, PAD, abnormal abdominal CT scan, severe sepsis, atrial fibrillation, and positive blood culture.   Orders placed for OT evaluation and treatment.  Performed at least two patient identifiers during session including name and wristband.  Prior to admission, Patient and Sister reported, he lives alone in a one story apartment, no BRI. Patient ambulates without AD, has HHA- M-F, 8am-5pm . Personal factors affecting patient at time of initial evaluation include: limited caregiver support, limited insight into deficits, decreased initiation and engagement, difficulty performing ADLs, and  difficulty performing IADLs. Upon evaluation, patient requires minimal  assist for UB ADLs, moderate and maximal assist for LB ADLs.  Occupational performance is affected by the following deficits: orientation, degenerative arthritic joint changes, impaired gross motor coordination, decreased activity tolerance, impaired problem solving, and decreased safety awareness.  Patient to benefit from continued Occupational Therapy treatment while in the hospital to address deficits as defined above and maximize level of functional independence with ADLs and functional mobility. Occupational Performance areas to address include: bathing/ shower, dressing, toilet hygiene, transfer to all surfaces, functional ambulation, functional mobility, health maintenance, medication routine/ management, IADLs: safety procedures, IADLs: meal prep/ clean up, and Leisure Participation. From OT standpoint, recommendation at time of d/c would be Level 2.     Rehab Resource Intensity Level, OT: II (Moderate Resource Intensity)

## 2024-05-20 NOTE — PLAN OF CARE
Problem: CARDIOVASCULAR - ADULT  Goal: Maintains optimal cardiac output and hemodynamic stability  Description: INTERVENTIONS:  - Monitor I/O, vital signs and rhythm  - Monitor for S/S and trends of decreased cardiac output  - Administer and titrate ordered vasoactive medications to optimize hemodynamic stability  - Assess quality of pulses, skin color and temperature  - Assess for signs of decreased coronary artery perfusion  - Instruct patient to report change in severity of symptoms  Outcome: Progressing  Goal: Absence of cardiac dysrhythmias or at baseline rhythm  Description: INTERVENTIONS:  - Continuous cardiac monitoring, vital signs, obtain 12 lead EKG if ordered  - Administer antiarrhythmic and heart rate control medications as ordered  - Monitor electrolytes and administer replacement therapy as ordered  Outcome: Progressing     Problem: PAIN - ADULT  Goal: Verbalizes/displays adequate comfort level or baseline comfort level  Description: Interventions:  - Encourage patient to monitor pain and request assistance  - Assess pain using appropriate pain scale  - Administer analgesics based on type and severity of pain and evaluate response  - Implement non-pharmacological measures as appropriate and evaluate response  - Consider cultural and social influences on pain and pain management  - Notify physician/advanced practitioner if interventions unsuccessful or patient reports new pain  Outcome: Progressing     Problem: INFECTION - ADULT  Goal: Absence or prevention of progression during hospitalization  Description: INTERVENTIONS:  - Assess and monitor for signs and symptoms of infection  - Monitor lab/diagnostic results  - Monitor all insertion sites, i.e. indwelling lines, tubes, and drains  - Monitor endotracheal if appropriate and nasal secretions for changes in amount and color  - [place] appropriate cooling/warming therapies per order  - Administer medications as ordered  - Instruct and encourage  patient and family to use good hand hygiene technique  - Identify and instruct in appropriate isolation precautions for identified infection/condition  Outcome: Progressing  Goal: Absence of fever/infection during neutropenic period  Description: INTERVENTIONS:  - Monitor WBC    Outcome: Progressing     Problem: SAFETY ADULT  Goal: Patient will remain free of falls  Description: INTERVENTIONS:  - Educate patient/family on patient safety including physical limitations  - Instruct patient to call for assistance with activity   - Consult OT/PT to assist with strengthening/mobility   - Keep Call bell within reach  - Keep bed low and locked with side rails adjusted as appropriate  - Keep care items and personal belongings within reach  - Initiate and maintain comfort rounds  - Make Fall Risk Sign visible to staff  - Offer Toileting every 2 Hours, in advance of need  - Initiate/Maintain bed alarm  - Obtain necessary fall risk management equipment: walker  - Apply yellow socks and bracelet for high fall risk patients  - Consider moving patient to room near nurses station  Outcome: Progressing  Goal: Maintain or return to baseline ADL function  Description: INTERVENTIONS:  -  Assess patient's ability to carry out ADLs; assess patient's baseline for ADL function and identify physical deficits which impact ability to perform ADLs (bathing, care of mouth/teeth, toileting, grooming, dressing, etc.)  - Assess/evaluate cause of self-care deficits   - Assess range of motion  - Assess patient's mobility; develop plan if impaired  - Assess patient's need for assistive devices and provide as appropriate  - Encourage maximum independence but intervene and supervise when necessary  - Involve family in performance of ADLs  - Assess for home care needs following discharge   - Consider OT consult to assist with ADL evaluation and planning for discharge  - Provide patient education as appropriate  Outcome: Progressing  Goal:  Maintains/Returns to pre admission functional level  Description: INTERVENTIONS:  - Perform AM-PAC 6 Click Basic Mobility/ Daily Activity assessment daily.  - Set and communicate daily mobility goal to care team and patient/family/caregiver.   - Collaborate with rehabilitation services on mobility goals if consulted  - Perform Range of Motion 2 times a day.  - Reposition patient every 2 hours.  - Dangle patient 2 times a day  - Stand patient 2 times a day  - Ambulate patient 2 times a day  - Out of bed to chair 2 times a day   - Out of bed for meals 3 times a day  - Out of bed for toileting  - Record patient progress and toleration of activity level   Outcome: Progressing

## 2024-05-20 NOTE — UTILIZATION REVIEW
"Initial Clinical Review    Admission: Date/Time/Statement:   Admission Orders (From admission, onward)       Ordered        05/18/24 1254  INPATIENT ADMISSION  Once                          Orders Placed This Encounter   Procedures    INPATIENT ADMISSION     Standing Status:   Standing     Number of Occurrences:   1     Order Specific Question:   Level of Care     Answer:   Med Surg [16]     Order Specific Question:   Bed request comments     Answer:   tele     Order Specific Question:   Estimated length of stay     Answer:   More than 2 Midnights     Order Specific Question:   Certification     Answer:   I certify that inpatient services are medically necessary for this patient for a duration of greater than two midnights. See H&P and MD Progress Notes for additional information about the patient's course of treatment.     ED Arrival Information       Expected   -    Arrival   5/18/2024 09:38    Acuity   Emergent              Means of arrival   Ambulance    Escorted by   [facility]   Hospitalist    Admission type   Emergency              Arrival complaint   Weakness             Chief Complaint   Patient presents with    Altered Mental Status     Pt presents via ems from home. Per ems, \"pt has hx dementia, but per caregiver pt has been more altered the past week. Decrease appetite. C/o bilateral leg weakness. Caregiver also noticed diarrhea with some blood in the toilet this morning. Pt hypotensive en route - bp 60-70 systolic. Hx AAA, CHF, HTN.\" Pt awake and alert upon arrival.        Initial Presentation: 69 y.o. male to ED from home via EMS w/ PMH of CHF, DVT, hypertension, dementia who presents with 4-day history of weakness, worsening loose watery brown diarrhea.  Notes some mild blood. Unintentional weight loss over the past couple weeks to months.  Poor p.o. intake.  Per family noted to be slightly confused.  Noted to be hypotensive on EMS in ED responding to IV fluids. + tachycardia and " leukocytosis . CT head wnl . Abnormal CT abd Multiple hepatic metastases, developing since 2023. Cr 1.59 , baseline 1. SBP responded to fluids and in the 110s . No acute source of infection found at this time. Admitted IP status w/  severe sepsis possibly due to viral gastroenteritis, ANNETTA. Admitted IP status w/ afib , severe sepsis plan for IVF , iv ceftriaxone , flagyl , monitor fever curve , wbc , BP . If Cr improves order MRI w/ MRCP , GI consult . PAD asa, eliquis and statin . Metabolic encephalopathy cont IV abx and monitor mental status . Pt is alert and oriented x2 at this time. CHF hold lasix and cont gentle IVF .     Anticipated Length of Stay/Certification Statement:  Patient will be admitted on an inpatient basis with an anticipated length of stay of greater than 2 midnights secondary to hypotension abnormal CT abdomen pelvis.     Date:    Day 2: reports feeling better today. Still with intermittent chills. Cont IVF, IV abx .  blood cultures + gram positive cocci in chains , consider ID consult. Wbc improved to 7.60 from 12. Given BP is stable resume po amiodarone .      GI Consult   CT reveals multiple hepatic metastases of unclear etiology. There is a questionable structure in the head of the pancreas. His father  of pancreas cancer. Plan for MRI MRCP  . may need an endoscopy colonoscopy if the MRI is negative. Cont flagyl and ceftriaxone . F/u BC and ua cx . Monitor Lfts .     Date:   Day 3: Has surpassed a 2nd midnight with active treatments and services. Cont stay w/ plan for MRCP . CA 19-9 pending . GI consulted . Continue with IV ceftriaxone and flagyl for now, consider d/c if infectious workup consistent with viral gastroenteritis . MRI remains pending         ED Triage Vitals   Temperature Pulse Respirations Blood Pressure SpO2   24 0953 24 0942 24 0942 24 0942 24 0942   98 °F (36.7 °C) 68 18 (!) 84/50 96 %      Temp Source Heart Rate Source  Patient Position - Orthostatic VS BP Location FiO2 (%)   05/18/24 0953 05/18/24 0942 05/18/24 0945 05/18/24 1000 --   Rectal Monitor Lying Right arm       Pain Score       05/18/24 1429       No Pain          Wt Readings from Last 1 Encounters:   05/18/24 84.5 kg (186 lb 4.6 oz)     Additional Vital Signs:   Date/Time Temp Pulse Resp BP MAP (mmHg) SpO2 O2 Device Patient Position - Orthostatic VS   05/20/24 23:40:14 98.1 °F (36.7 °C) 73 16 114/77 89 94 % None (Room air) Lying   05/20/24 15:51:12 98.6 °F (37 °C) 73 18 110/65 80 95 %       05/20/24 07:20:22 97.6 °F (36.4 °C) 70 19 134/83 100 92 % -- --     05/19/24 22:45:04 99 °F (37.2 °C) 71 16 116/58 77 93 % None (Room air) --   05/19/24 07:11:35 -- 71 16 126/78 94 93 % -- --   05/19/24 02:38:02 97.7 °F (36.5 °C) 74 16 118/68 85 96 % -- --   05/18/24 22:48:56 97.6 °F (36.4 °C) 75 16 96/56 69 95 % -- Lying   05/18/24 21:46:43 -- 74 -- 93/56 68 91 % None (Room air) --   05/18/24 19:11:17 98.5 °F (36.9 °C) 78 16 94/52 66 92 % -- --   05/18/24 18:46:09 -- 84 -- 84/50 Abnormal  61 Abnormal  93 % -- --   05/18/24 17:39:58 -- 88 -- -- -- 93 % -- --   05/18/24 17:39:37 -- 96 -- -- -- 75 % Abnormal  -- --   05/18/24 1728 -- -- -- 83/57 Abnormal  -- -- -- Lying   05/18/24 15:05:46 -- -- -- 83/53 Abnormal  63 Abnormal  -- -- --   05/18/24 13:37:46 97 °F (36.1 °C) Abnormal  -- -- 93/61 72 -- -- --   05/18/24 1300 -- 72 18 94/53 68 94 % -- --   05/18/24 1245 -- 72 16 83/52 Abnormal  62 Abnormal  93 % None (Room air) Lying   05/18/24 1230 -- 69 18 102/59 76 93 % -- --   05/18/24 1215 -- 69 18 91/55 67 93 % -- --   05/18/24 1200 -- 66 18 94/63 74 93 % None (Room air) --   05/18/24 1145 -- 66 18 93/58 71 96 % None (Room air) --   05/18/24 1130 -- 66 18 97/61 74 94 % None (Room air) --   05/18/24 1115 -- 65 18 95/60 72 92 % None (Room air) --   05/18/24 1111 -- 65 16 104/56 -- 94 % None (Room air) --   05/18/24 1035 -- -- -- -- -- -- None (Room air) --   05/18/24 1030 -- 62 16 88/56  Abnormal  67 94 % None (Room air) Lying   05/18/24 1000 -- 67 16 88/55 Abnormal  66 94 % None (Room air) Lying   05/18/24 0953 98 °F (36.7 °C) -- -- -- -- -- -- --   05/18/24 0945 -- 69  16 88/53 Abnormal  65 94 % None (Room air) Lying   Pulse: Simultaneous filing. User may not have seen previous data. at 05/18/24 094     Pertinent Labs/Diagnostic Test Results:   5/18 EKG NSR   CT head without contrast   Final Result by Devon Mccabe MD (05/18 1147)      No acute intracranial abnormality.                  Workstation performed: [id number]         CT chest abdomen pelvis w contrast   Final Result by Devon Mccabe MD (05/18 124)      1.  Multiple hepatic metastases, developing since 11/30/2023.      2.  Retroperitoneal, peripancreatic and vinicius hepatis lymphadenopathy, also new since 11/30/2023.      3.  Site of primary malignancy unknown although there is subtle suggestion of a 0.7 x 1.1 cm mass at the junction of the pancreatic head and uncinate process. This can be better evaluated with MRI of the abdomen without and with IV contrast, with MRCP.      4.  Aortobifemoral stent graft present with probable complete occlusion of the left iliac limb and probable occlusion of the femoral-femoral bypass graft.      5.  Severe emphysema.      6.  Other than subsegmental atelectasis in the bases of the both lower lobes, no evidence of acute abnormality in the chest.         I personally discussed this study with BRIDGET IRENE on 5/18/2024 12:27 PM.               Workstation performed: [id number]         XR chest 1 view portable   ED Interpretation by Bridget Irene DO (05/18 1018)   No acute abnormality in the chest.      Final Result by Lurdes Mathis MD (05/18 9669)      No acute cardiopulmonary disease.            Workstation performed: [id number]         MRI Inpatient Order    (Results Pending)   MRI Inpatient Order    (Results Pending)     Results from last 7 days   Lab Units  05/18/24  0954   SARS-COV-2  Negative     Results from last 7 days   Lab Units 05/20/24  0512 05/19/24  0558 05/18/24  0954   WBC Thousand/uL 5.95 7.60 12.72*   HEMOGLOBIN g/dL 12.0 13.3 14.5   HEMATOCRIT % 38.0 42.9 47.0   PLATELETS Thousands/uL 138* 136* 187   TOTAL NEUT ABS Thousands/µL  --  6.08 10.44*         Results from last 7 days   Lab Units 05/20/24  0512 05/19/24  0558 05/18/24  0954   SODIUM mmol/L 136 137 134*   POTASSIUM mmol/L 3.9 4.3 5.3   CHLORIDE mmol/L 107 106 100   CO2 mmol/L 21 23 24   ANION GAP mmol/L 8 8 10   BUN mg/dL 16 23 41*   CREATININE mg/dL 0.75 0.97 1.59*   EGFR ml/min/1.73sq m 93 79 43   CALCIUM mg/dL 7.5* 8.0* 8.8   MAGNESIUM mg/dL  --   --  2.3     Results from last 7 days   Lab Units 05/20/24  0512 05/19/24  0558 05/18/24  0954   AST U/L 51* 57* 75*   ALT U/L 15 18 27   ALK PHOS U/L 220* 224* 261*   TOTAL PROTEIN g/dL 5.4* 6.2* 7.2   ALBUMIN g/dL 2.7* 3.2* 3.4*   TOTAL BILIRUBIN mg/dL 0.71 0.73 0.83   BILIRUBIN DIRECT mg/dL  --   --  0.15   AMMONIA umol/L  --   --  17*     Results from last 7 days   Lab Units 05/18/24  0941   POC GLUCOSE mg/dl 108     Results from last 7 days   Lab Units 05/20/24  0512 05/19/24  0558 05/18/24  0954   GLUCOSE RANDOM mg/dL 72 81 90       Results from last 7 days   Lab Units 05/19/24  1323 05/18/24  1254 05/18/24  0954   HS TNI 0HR ng/L  --   --  8   HS TNI 2HR ng/L  --  7  --    HSTNI D2 ng/L  --  -1  --    HS TNI 4HR ng/L 12  --   --          Results from last 7 days   Lab Units 05/18/24  0954   PROTIME seconds 18.4*   INR  1.45*   PTT seconds 35     Results from last 7 days   Lab Units 05/18/24  0954   TSH 3RD GENERATON uIU/mL 4.188     Results from last 7 days   Lab Units 05/18/24  0954   PROCALCITONIN ng/ml 1.26*     Results from last 7 days   Lab Units 05/18/24  0954   LACTIC ACID mmol/L 1.9     Results from last 7 days   Lab Units 05/18/24  0954   BNP pg/mL 77       Results from last 7 days   Lab Units 05/18/24  0954   LIPASE u/L 8*        Results from last 7 days   Lab Units 05/18/24  1416   CLARITY UA  Clear   COLOR UA  Light Yellow   SPEC GRAV UA  1.048*   PH UA  5.0   GLUCOSE UA mg/dl Negative   KETONES UA mg/dl Negative   BLOOD UA  Negative   PROTEIN UA mg/dl Negative   NITRITE UA  Negative   BILIRUBIN UA  Negative   UROBILINOGEN UA (BE) mg/dl <2.0   LEUKOCYTES UA  Negative     Results from last 7 days   Lab Units 05/18/24  0954   INFLUENZA A PCR  Negative   INFLUENZA B PCR  Negative   RSV PCR  Negative     Results from last 7 days   Lab Units 05/18/24  0955 05/18/24  0954   BLOOD CULTURE  No Growth at 24 hrs.  --    GRAM STAIN RESULT   --  Gram positive cocci in chains*       ED Treatment:   Medication Administration from 05/18/2024 0938 to 05/18/2024 1335         Date/Time Order Dose Route Action     05/18/2024 1001 EDT sodium chloride 0.9 % bolus 2,535 mL 2,535 mL Intravenous New Bag     05/18/2024 1103 EDT ceftriaxone (ROCEPHIN) 1 g/50 mL in dextrose IVPB 1,000 mg Intravenous New Bag          Past Medical History:   Diagnosis Date    CHF (congestive heart failure) (HCC)     Dementia (HCC)     DVT (deep venous thrombosis) (HCC)     lt leg    Hypertension     Hypotension 05/18/2024     Present on Admission:   PAD (peripheral artery disease) (HCC)   Hypertension   Chronic systolic heart failure (HCC)   Acute kidney injury (HCC)   Metabolic encephalopathy      Admitting Diagnosis: Hypotension [I95.9]  Cancer, metastatic to liver (HCC) [C78.7]  Failure to thrive in adult [R62.7]  ANNETTA (acute kidney injury) (HCC) [N17.9]  AMS (altered mental status) [R41.82]  Age/Sex: 69 y.o. male  Admission Orders:  Scheduled Medications:  amiodarone, 200 mg, Oral, Daily With Breakfast  apixaban, 5 mg, Oral, BID  aspirin, 81 mg, Oral, Daily  atorvastatin, 80 mg, Oral, Daily With Dinner  cefTRIAXone, 1,000 mg, Intravenous, Q24H  metroNIDAZOLE, 500 mg, Oral, Q8H LOREN  mirtazapine, 30 mg, Oral, HS  pantoprazole, 40 mg, Oral, Early Morning      Continuous IV  Infusions:     PRN Meds:  acetaminophen, 650 mg, Oral, Q6H PRN  temazepam, 15 mg, Oral, HS PRN    Reg diet   PT OT eval   I&O   Tele   Up and OOB     IP CONSULT TO GASTROENTEROLOGY  IP CONSULT TO NUTRITION SERVICES    Network Utilization Review Department  ATTENTION: Please call with any questions or concerns to [phone] and carefully listen to the prompts so that you are directed to the right person. All voicemails are confidential.   For Discharge needs, contact Care Management DC Support Team at [phone] opt. [phone]  Send all requests for admission clinical reviews, approved or denied determinations and any other requests to dedicated fax number below belonging to the campus where the patient is receiving treatment. List of dedicated fax numbers for the Facilities:  FACILITY NAME UR FAX NUMBER   ADMISSION DENIALS (Administrative/Medical Necessity) [phone]   DISCHARGE SUPPORT TEAM (NETWORK) [phone]   PARENT CHILD HEALTH (Maternity/NICU/Pediatrics) [phone]   [facility] [phone]   [facility] [phone]   [facility] [phone]   [facility] [phone]   [facility] [phone]   [facility] [phone]   [facility] [phone]   [facility] [phone]   [facility] [phone]   [facility] [phone]   [facility] [phone]   [facility] [phone]

## 2024-05-20 NOTE — ASSESSMENT & PLAN NOTE
Creatinine 1.59, baseline appears to be around 1.  Suspect prerenal in setting of poor p.o. intake. Improved to 0.7  Avoid nephrotoxic agents  S/p IV fluids  Encouraged increase po intake  Monitor creatinine with a.m. BMP

## 2024-05-20 NOTE — TELEPHONE ENCOUNTER
Reviewed. Recommend that they reach out to [facility] for information regarding his stents as I do not know what type of stents were placed.

## 2024-05-20 NOTE — PROGRESS NOTES
[facility]  Progress Note  Name: Tom Pisano I  MRN: [id number]  Unit/Bed#: -01 I Date of Admission: 5/18/2024   Date of Service: 5/20/2024 I Hospital Day: 2    Assessment & Plan   Positive blood culture  Assessment & Plan  1/2 blood culture + gram positive cocci, unclear if this is true but given concern for severe sepsis will consider true infection at this point  Follow up on blood cultures   Continue with IV ceftriaxone and flagyl. If other blood culture remains negative x 24 like d/c antibiotics as suspect contamination  Monitor fever curve and wbc count      Atrial fibrillation (HCC)  Assessment & Plan  Hx of a fib on eliquis and amiodarone daily  EKG with NSR and PAC  Given that blood pressure is stable  Will resume po amiodarone        Severe sepsis (HCC)  Assessment & Plan  As evidenced by tachycardia and leukocytosis, Chest CT negative for acute infectious etiology. Family reports diarrhea, patient has had no BM since admission.   Patient received IV ceftriaxone  S/p 30 cc/kg Iv fluids, continue with IV fluids  1/2 blood cultures + gram positive cocci in chains  Continue with IV ceftriaxone and flagyl for now, consider d/c if infectious workup consistent with viral gastroenteritis  Monitor fever curve and WBC count    Abnormal abdominal CT scan  Assessment & Plan  Multiple hepatic metastases, developing since 11/30/2023. Retroperitoneal, peripancreatic and vinicius hepatis lymphadenopathy, also new since 11/30/2023.Site of primary malignancy unknown although there is subtle suggestion of a 0.7 x 1.1 cm mass at the junction of the pancreatic head and uncinate process. This can be better evaluated with MRI of the abdomen without and with IV contrast, with MRCP  MRI ordered  GI consulted, appreciate recommendations   pending    PAD (peripheral artery disease) (HCC)  Assessment & Plan  History of CAD, PAD, CVA currently on aspirin Eliquis and statin    Metabolic  encephalopathy  Assessment & Plan  Family reports some mild baseline cognitive impairment, acutely worsened over the past 4 days.  Suspect metabolic secondary to severe sepsis, hypotension.  Per family patient is now at baseline.   Head CT negative for acute finding  Continue with IV antibiotics and plan as above  Monitor mental status closely    Chronic systolic heart failure (HCC)  Assessment & Plan  Wt Readings from Last 3 Encounters:   05/18/24 84.5 kg (186 lb 4.6 oz)   12/04/23 83.5 kg (184 lb)   08/29/23 87.1 kg (192 lb)   Holding home dose Lasix and antihypertensive regimen in setting of hypotension  Hold off on additional IV fluids  Monitor intake and output closely            Acute kidney injury (HCC)  Assessment & Plan  Creatinine 1.59, baseline appears to be around 1.  Suspect prerenal in setting of poor p.o. intake. Improved to 0.7  Avoid nephrotoxic agents  S/p IV fluids  Encouraged increase po intake  Monitor creatinine with a.m. BMP    Hypertension  Assessment & Plan  Holding home dose Lasix 40 mg daily and Coreg 25 mg twice daily entreso in setting of hypotension as above    Status post femorofemoral bypass surgery  Assessment & Plan  Follows with vascular surgery outpatient, Ct showing Aortobifemoral stent graft present with probable complete occlusion of the left iliac limb and probable occlusion of the femoral-femoral bypass graft. Per chart review and family, this is known. Peripheral pulses present.   Close monitoring  Consider inpatient vs outpatient vascular f/u              VTE Pharmacologic Prophylaxis: VTE Score: 2 Low Risk (Score 0-2) - Encourage Ambulation.    Mobility:   Basic Mobility Inpatient Raw Score: 16  JH-HLM Goal: 5: Stand one or more mins  JH-HLM Achieved: 5: Stand (1 or more minutes)  JH-HLM Goal achieved. Continue to encourage appropriate mobility.    Patient Centered Rounds: I performed bedside rounds with nursing staff today.   Discussions with Specialists or Other Care  Team Provider: JAMIL    Education and Discussions with Family / Patient: Updated  (sister) at bedside.    Total Time Spent on Date of Encounter in care of patient: 60 mins. This time was spent on one or more of the following: performing physical exam; counseling and coordination of care; obtaining or reviewing history; documenting in the medical record; reviewing/ordering tests, medications or procedures; communicating with other healthcare professionals and discussing with patient's family/caregivers.    Current Length of Stay: 2 day(s)  Current Patient Status: Inpatient   Certification Statement: The patient will continue to require additional inpatient hospital stay due to MRCP  Discharge Plan: Anticipate discharge in 24-48 hrs to discharge location to be determined pending rehab evaluations.    Code Status: Level 1 - Full Code    Subjective:   Pt reports no naseua, vomiting. No documented BM but pt reporting he went about 3 times yesterday.    Objective:     Vitals:   Temp (24hrs), Av.3 °F (36.8 °C), Min:97.6 °F (36.4 °C), Max:99 °F (37.2 °C)    Temp:  [97.6 °F (36.4 °C)-99 °F (37.2 °C)] 97.6 °F (36.4 °C)  HR:  [70-71] 70  Resp:  [16-19] 19  BP: (116-134)/(58-83) 134/83  SpO2:  [92 %-93 %] 92 %  Body mass index is 25.27 kg/m².     Input and Output Summary (last 24 hours):     Intake/Output Summary (Last 24 hours) at 2024 0923  Last data filed at 2024 0401  Gross per 24 hour   Intake 1111.25 ml   Output 400 ml   Net 711.25 ml       Physical Exam:   Physical Exam  Constitutional:       General: He is not in acute distress.     Appearance: He is well-developed. He is not diaphoretic.   HENT:      Head: Normocephalic and atraumatic.      Mouth/Throat:      Pharynx: No oropharyngeal exudate.   Eyes:      General: No scleral icterus.     Extraocular Movements: Extraocular movements intact.      Conjunctiva/sclera: Conjunctivae normal.   Neck:      Vascular: No JVD.      Trachea: No tracheal  deviation.   Cardiovascular:      Rate and Rhythm: Normal rate and regular rhythm.      Heart sounds: No murmur heard.     No friction rub. No gallop.   Pulmonary:      Effort: Pulmonary effort is normal. No respiratory distress.      Breath sounds: No stridor. No wheezing.   Abdominal:      General: There is no distension.      Palpations: Abdomen is soft. There is no mass.      Tenderness: There is no abdominal tenderness. There is no right CVA tenderness or left CVA tenderness.   Musculoskeletal:         General: No tenderness.      Right lower leg: No edema.      Left lower leg: No edema.   Skin:     General: Skin is warm.      Coloration: Skin is not pale.      Findings: No erythema.   Neurological:      Mental Status: He is oriented to person, place, and time. Mental status is at baseline.   Psychiatric:         Behavior: Behavior normal.         Thought Content: Thought content normal.          Additional Data:     Labs:  Results from last 7 days   Lab Units 05/20/24  0512 05/19/24  0558   WBC Thousand/uL 5.95 7.60   HEMOGLOBIN g/dL 12.0 13.3   HEMATOCRIT % 38.0 42.9   PLATELETS Thousands/uL 138* 136*   SEGS PCT %  --  80*   LYMPHO PCT %  --  9*   MONO PCT %  --  10   EOS PCT %  --  0     Results from last 7 days   Lab Units 05/20/24  0512   SODIUM mmol/L 136   POTASSIUM mmol/L 3.9   CHLORIDE mmol/L 107   CO2 mmol/L 21   BUN mg/dL 16   CREATININE mg/dL 0.75   ANION GAP mmol/L 8   CALCIUM mg/dL 7.5*   ALBUMIN g/dL 2.7*   TOTAL BILIRUBIN mg/dL 0.71   ALK PHOS U/L 220*   ALT U/L 15   AST U/L 51*   GLUCOSE RANDOM mg/dL 72     Results from last 7 days   Lab Units 05/18/24  0954   INR  1.45*     Results from last 7 days   Lab Units 05/18/24  0941   POC GLUCOSE mg/dl 108         Results from last 7 days   Lab Units 05/18/24  0954   LACTIC ACID mmol/L 1.9   PROCALCITONIN ng/ml 1.26*       Lines/Drains:  Invasive Devices       Peripheral Intravenous Line  Duration             Peripheral IV 05/18/24 Right;Ventral  (anterior) Forearm 2 days    Peripheral IV 05/18/24 Left Antecubital 1 day                          Imaging: No pertinent imaging reviewed.    Recent Cultures (last 7 days):   Results from last 7 days   Lab Units 05/18/24  0955 05/18/24  0954   BLOOD CULTURE  No Growth at 24 hrs.  --    GRAM STAIN RESULT   --  Gram positive cocci in chains*       Last 24 Hours Medication List:   Current Facility-Administered Medications   Medication Dose Route Frequency Provider Last Rate    acetaminophen  650 mg Oral Q6H PRN Marge Echoelena Morgan, DO      amiodarone  200 mg Oral Daily With Breakfast Marge Echoelena Morgan, DO      apixaban  5 mg Oral BID [facility] Cathy, DO      aspirin  81 mg Oral Daily [facility]      atorvastatin  80 mg Oral Daily With Dinner [facility], [facility]      cefTRIAXone  1,000 mg Intravenous Q24H [facility], DO 1,000 mg (05/20/24 0912)    metroNIDAZOLE  500 mg Oral Q8H LOREN Marge Echoelena Morgan, DO      mirtazapine  30 mg Oral HS [facility] Cathy, DO      pantoprazole  40 mg Oral Early Morning [facility] Cathy, DO      temazepam  15 mg Oral HS PRN Marge Echoelena Morgan,           Today, Patient Was Seen By: Marge Morgan DO    **Please Note: This note may have been constructed using a voice recognition system.**

## 2024-05-20 NOTE — ASSESSMENT & PLAN NOTE
Wt Readings from Last 3 Encounters:   05/18/24 84.5 kg (186 lb 4.6 oz)   12/04/23 83.5 kg (184 lb)   08/29/23 87.1 kg (192 lb)   Holding home dose Lasix and antihypertensive regimen in setting of hypotension  Hold off on additional IV fluids  Monitor intake and output closely

## 2024-05-20 NOTE — PROGRESS NOTES
Progress Note - Tom Pisano 69 y.o. male MRN: [id number]    Unit/Bed#: -01 Encounter: [id number]        Subjective:     Patient's 2 sisters are at the bedside.  They report that there is an extensive family history of cancer including lung cancer and stomach cancer.  Their paternal grandfather had pancreatic cancer.  Patient has no complaints other than lack of appetite.  Patient sisters think that he has lost weight since his pants fit looser and his face looks thinner.  He has no other complaints at this time.  Awaiting MRI.    ROS: As noted in the HPI, otherwise all others negative.    Objective:     Vitals: Blood pressure 134/83, pulse 70, temperature 97.6 °F (36.4 °C), resp. rate 19, height 6' (1.829 m), weight 84.5 kg (186 lb 4.6 oz), SpO2 92%.,Body mass index is 25.27 kg/m².      Intake/Output Summary (Last 24 hours) at 5/20/2024 1251  Last data filed at 5/20/2024 1100  Gross per 24 hour   Intake 1351.25 ml   Output 400 ml   Net 951.25 ml       Physical Exam:     General Appearance: Alert and oriented x 3. In no respiratory distress  Lungs: Clear to auscultation bilaterally, no rales or rhonchi  Heart: Regular rate and rhythm, S1, S2 normal, no murmur, click, rub or gallop  Abdomen: Soft, non-tender, non-distended; bowel sounds normal; no masses or no organomegaly  Extremities: No cyanosis, edema    Invasive Devices       Peripheral Intravenous Line  Duration             Peripheral IV 05/18/24 Left Antecubital 2 days    Peripheral IV 05/18/24 Right;Ventral (anterior) Forearm 2 days                    Lab Results:  Results from last 7 days   Lab Units 05/20/24  0512 05/19/24  0558   WBC Thousand/uL 5.95 7.60   HEMOGLOBIN g/dL 12.0 13.3   HEMATOCRIT % 38.0 42.9   PLATELETS Thousands/uL 138* 136*   SEGS PCT %  --  80*   LYMPHO PCT %  --  9*   MONO PCT %  --  10   EOS PCT %  --  0     Results from last 7 days   Lab Units 05/20/24  0512   POTASSIUM mmol/L 3.9   CHLORIDE mmol/L 107   CO2 mmol/L 21   BUN  mg/dL 16   CREATININE mg/dL 0.75   CALCIUM mg/dL 7.5*   ALK PHOS U/L 220*   ALT U/L 15   AST U/L 51*     Results from last 7 days   Lab Units 05/18/24  0954   INR  1.45*     Results from last 7 days   Lab Units 05/18/24  0954   LIPASE u/L 8*       Imaging Studies: I have personally reviewed pertinent imaging studies.    XR chest 1 view portable    Result Date: 5/18/2024  Impression: No acute cardiopulmonary disease. Workstation performed: [id number]     CT chest abdomen pelvis w contrast    Result Date: 5/18/2024  Impression: 1.  Multiple hepatic metastases, developing since 11/30/2023. 2.  Retroperitoneal, peripancreatic and vinicius hepatis lymphadenopathy, also new since 11/30/2023. 3.  Site of primary malignancy unknown although there is subtle suggestion of a 0.7 x 1.1 cm mass at the junction of the pancreatic head and uncinate process. This can be better evaluated with MRI of the abdomen without and with IV contrast, with MRCP. 4.  Aortobifemoral stent graft present with probable complete occlusion of the left iliac limb and probable occlusion of the femoral-femoral bypass graft. 5.  Severe emphysema. 6.  Other than subsegmental atelectasis in the bases of the both lower lobes, no evidence of acute abnormality in the chest. I personally discussed this study with MAUREEN AMARO on 5/18/2024 12:27 PM. Workstation performed: [id number]     CT head without contrast    Result Date: 5/18/2024  Impression: No acute intracranial abnormality. Workstation performed: [id number]         Assessment and Plan:     1) Abdominal pain and cachexia; abnormal CT imaging with multiple hepatic masses and pancreatic lesion - On admission revealing multiple enlarged lymph nodes developing since November 2023 near the vinicius hepatis and SMA.  In addition, there are several masses within the liver the largest measuring 3 x 5.5 cm.  Bile ducts appear normal in caliber.  No gallstones.  On the pancreas there is a questionable 0.7 x  "1.1 cm structure near the uncinate process.  No convincing evidence of intra pancreatic tumor according to report.  Primary malignancy unknown.  There is extensive family history of malignancy.  It has been more than 15 years since he had a colonoscopy.  - Awaiting MRI  - CA 19-9, check CEA and AFP  - Recommend holding Eliquis in the event that we need to proceed with EGD and colonoscopy during this admission if the source of malignancy is not pancreatic.  It is okay for the patient receive heparin products instead as they have a shorter half-life.  - We discussed the role of EGD/colonoscopy to evaluate the bowel for malignancy versus endoscopic ultrasound to evaluate the pancreas for malignancy  - Patient, and patient's sisters agree with plan      Portions of the record may have been created with voice recognition software.  Occasional wrong word or \"sound a like\" substitutions may have occurred due to the inherent limitations of voice recognition software.  Read the chart carefully and recognize, using context, where substitutions have occurred.    "

## 2024-05-20 NOTE — PLAN OF CARE
Problem: PHYSICAL THERAPY ADULT  Goal: Performs mobility at highest level of function for planned discharge setting.  See evaluation for individualized goals.  Description: Treatment/Interventions: Functional transfer training, LE strengthening/ROM, Therapeutic exercise, Endurance training, Cognitive reorientation, Patient/family training, Bed mobility, Gait training, Spoke to nursing, OT, Family          See flowsheet documentation for full assessment, interventions and recommendations.  Note: Prognosis: Good  Problem List: Decreased strength, Decreased endurance, Impaired balance, Decreased mobility, Decreased cognition  Assessment: Pt is 69 year old male seen for PT evaluation s/p admit to [facility] on [facility]/18/[facility]24 with Hypotension. PT consulted to assess pt's functional mobility and discharge needs. Order placed for PT evaluation and treatment, with up and out of bed as tolerated order. Comorbidities affecting pt's physical performance at time of assessment include history of femorofemoral bypass surgery, hypertension, ANNETTA, chronic systolic heart failure, metabolic encephalopathy, PAD, abnormal abdominal CT scan, severe sepsis, atrial fibrillation, and positive blood culture. Prior to hospitalization, pt was independent with all functional mobility without an AD. Pt ambulates household and community distances. Pt resides alone, in a one level apartment, with no steps to enter. Personal factors affecting pt at time of initial evaluation include ambulating with an assistive device, inability to ambulate community distances, inability to navigate level surfaces without external assistance, unable to perform dynamic tasks in the community, limited home support, inability to live alone, positive fall history, difficulty performing ADLs, inability to perform IADLs, and limited insight into impairments. Please find objective findings from PT assessment regarding body systems outlined above with impairments and  limitations including weakness, impaired balance, decreased endurance, gait deviations, decreased activity tolerance, decreased functional mobility tolerance, fall risk, and decreased cognition. The following objective measures were performed on initial evaluation Barthel Index: 50/100, Modified [place]: 4 (moderate/severe disability), and AM-PAC 6-Clicks: 17/24. Pt's clinical presentation is currently unstable/unpredictable seen in pt's presentation of need for ongoing medical management/monitoring, pt is a fall risk, pt requires use of RW for safe ambulation, and pt requires cues and assist for safety with functional mobility. Pt to benefit from continued PT treatment to address deficits as defined above and maximize pt's level of function and independence with mobility. From a PT standpoint, recommendation at time of discharge would be level 2, moderate resource intensity in order to facilitate return to prior level of function.    Barriers to Discharge: Decreased caregiver support, Other (Comment) (decline in functional mobility)     Rehab Resource Intensity Level, PT: II (Moderate Resource Intensity)    See flowsheet documentation for full assessment.

## 2024-05-20 NOTE — TELEPHONE ENCOUNTER
Received call from scheduling stating pt's sister, Nayla called stating pt is currently in the hospital and needs to get an MRI. However before getting an MRI, they need to know what kind of stents were placed, either mesh or metal. Pt had a femoral endarterectomy on 12/7/2020 at [facility]. Informed  that pt would have to call the surgeon who performed the procedure to obtain this info however will send a message to our triage provider to review to see if they have any further recommendations.

## 2024-05-20 NOTE — NUTRITION
05/20/24 3554   Recommendations/Interventions   Interventions/Recommendations Adjust diet order;Supplement initiate;Obtain current weight;Speech/swallow evaluation   Intervention Comments Per family request diet downgraded to Level 2 Dysphagia, thin liquid with Ensure plus high protein TID (chocolate flavor preferred).   Recommendations to Provider Adjust diet texture per SLP. Monitor electrolytes.

## 2024-05-20 NOTE — PHYSICAL THERAPY NOTE
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)    Status post femorofemoral bypass surgery    Cardiomyopathy (HCC)    Hypertension    Bilateral leg edema    Status post endovascular aneurysm repair (EVAR)    Acute kidney injury (HCC)    RSV infection    Chronic systolic heart failure (HCC)    Metabolic encephalopathy    PAD (peripheral artery disease) (HCC)    Abnormal abdominal CT scan    Severe sepsis (HCC)    Atrial fibrillation (HCC)    Positive blood culture     Past Medical History  Past Medical History:   Diagnosis Date    CHF (congestive heart failure) (HCC)     Dementia (HCC)     DVT (deep venous thrombosis) (HCC)     lt leg    Hypertension     Hypotension 05/18/2024     Past Surgical History  Past Surgical History:   Procedure Laterality Date    ABDOMINAL AORTIC ANEURYSM REPAIR, OPEN      repaired x2      05/20/24 0903   PT Last Visit   PT Visit Date 05/20/24   Note Type   Note type Evaluation   Pain Assessment   Pain Assessment Tool 0-10   Pain Score No Pain   Restrictions/Precautions   Weight Bearing Precautions Per Order No   Other Precautions Cognitive;Chair Alarm;Bed Alarm;Fall Risk   Home Living   Type of Home Apartment   Home Layout One level;Able to live on main level with bedroom/bathroom;Performs ADLs on one level;Elevator  (No BRI)   Bathroom Shower/Tub Tub/shower unit   Bathroom Toilet Standard   Bathroom Equipment Grab bars in shower;Grab bars around toilet   Bathroom Accessibility Accessible   Home Equipment Other (Comment)  (none per patient)   Additional Comments Pt ambulates without an AD.   Prior Function   Level of [place]  "Independent with functional mobility;Independent with ADLs;Needs assistance with IADLS   Lives With Alone   Receives Help From Family;Personal care attendant  (M-F, 8am-5pm )   IADLs Family/Friend/Other provides transportation;Family/Friend/Other provides meals;Family/Friend/Other provides medication management  (mom's meals)   Falls in the last 6 months 1 to 4  (1 almost fall)   Vocational Retired   General   Family/Caregiver Present Yes  (pt's sister)   Cognition   Overall Cognitive Status Impaired   Arousal/Participation Alert   Orientation Level Oriented to person;Oriented to place;Disoriented to time;Disoriented to situation   Memory Decreased recall of recent events;Decreased short term memory;Decreased recall of biographical information   Following Commands Follows one step commands without difficulty   Comments Pt agreeable to PT.   Subjective   Subjective \"I could get up.\"   RLE Assessment   RLE Assessment X   Strength RLE   RLE Overall Strength 4-/5   LLE Assessment   LLE Assessment X   Strength LLE   LLE Overall Strength 4-/5   Light Touch   RLE Light Touch Grossly intact   LLE Light Touch Grossly intact   Bed Mobility   Supine to Sit 4  Minimal assistance   Additional items Assist x 1;HOB elevated;Bedrails;Increased time required;Verbal cues   Transfers   Sit to Stand 4  Minimal assistance   Additional items Assist x 1;Increased time required;Verbal cues   Stand to Sit 4  Minimal assistance   Additional items Assist x 1;Armrests;Increased time required;Verbal cues   Ambulation/Elevation   Gait pattern Decreased toe off;Decreased heel strike;Decreased hip extension;Short stride;Shuffling   Gait Assistance 4  Minimal assist   Additional items Assist x 1;Verbal cues   Assistive Device Rolling walker   Distance 60 feet   Balance   Static Sitting Fair +   Dynamic Sitting Fair   Static Standing Fair -   Dynamic Standing Poor +   Ambulatory Poor +   Endurance Deficit   Endurance Deficit Yes   Endurance " Deficit Description decreased activity tolerance   Activity Tolerance   Activity Tolerance Patient tolerated treatment well   Medical Staff Made Aware OT Basia  (Co-evaluation performed with OT secondary to complex medical condition of patient and regression of functional status from baseline. PT/OT goals were addressed separately.)   Nurse Made Aware CASE Fine   Assessment   Prognosis Good   Problem List Decreased strength;Decreased endurance;Impaired balance;Decreased mobility;Decreased cognition   Assessment Pt is 69 year old male seen for PT evaluation s/p admit to [facility] on 5/18/2024 with Hypotension. PT consulted to assess pt's functional mobility and discharge needs. Order placed for PT evaluation and treatment, with up and out of bed as tolerated order. Comorbidities affecting pt's physical performance at time of assessment include history of femorofemoral bypass surgery, hypertension, ANNETTA, chronic systolic heart failure, metabolic encephalopathy, PAD, abnormal abdominal CT scan, severe sepsis, atrial fibrillation, and positive blood culture. Prior to hospitalization, pt was independent with all functional mobility without an AD. Pt ambulates household and community distances. Pt resides alone, in a one level apartment, with no steps to enter. Personal factors affecting pt at time of initial evaluation include ambulating with an assistive device, inability to ambulate community distances, inability to navigate level surfaces without external assistance, unable to perform dynamic tasks in the community, limited home support, inability to live alone, positive fall history, difficulty performing ADLs, inability to perform IADLs, and limited insight into impairments. Please find objective findings from PT assessment regarding body systems outlined above with impairments and limitations including weakness, impaired balance, decreased endurance, gait deviations, decreased activity tolerance, decreased  functional mobility tolerance, fall risk, and decreased cognition. The following objective measures were performed on initial evaluation Barthel Index: 50/100, Modified [place]: 4 (moderate/severe disability), and -PAC 6-Clicks: 17/24. Pt's clinical presentation is currently unstable/unpredictable seen in pt's presentation of need for ongoing medical management/monitoring, pt is a fall risk, pt requires use of RW for safe ambulation, and pt requires cues and assist for safety with functional mobility. Pt to benefit from continued PT treatment to address deficits as defined above and maximize pt's level of function and independence with mobility. From a PT standpoint, recommendation at time of discharge would be level 2, moderate resource intensity in order to facilitate return to prior level of function.   Barriers to Discharge Decreased caregiver support;Other (Comment)  (decline in functional mobility)   Goals   STG Expiration Date 05/30/24   Short Term Goal #1 In 10 days: Increase bilateral LE strength 1/2 grade to facilitate independent mobility, Perform all bed mobility tasks modified independent to decrease caregiver burden, Perform all transfers modified independent to improve independence, Ambulate > 150 ft. with least restrictive assistive device modified independent w/o LOB and w/ normalized gait pattern 100% of the time, and Increase all balance 1/2 grade to decrease risk for falls   Plan   Treatment/Interventions Functional transfer training;LE strengthening/ROM;Therapeutic exercise;Endurance training;Cognitive reorientation;Patient/family training;Bed mobility;Gait training;Spoke to nursing;OT;Family   PT Frequency 3-5x/wk   Discharge Recommendation   Rehab Resource Intensity Level, PT II (Moderate Resource Intensity)   AM-PAC Basic Mobility Inpatient   Turning in Flat Bed Without Bedrails 3   Lying on Back to Sitting on Edge of Flat Bed Without Bedrails 3   Moving Bed to Chair 3   Standing Up From  Chair Using Arms 3   Walk in Room 3   Climb 3-5 Stairs With Railing 2   Basic Mobility Inpatient Raw Score 17   Basic Mobility Standardized Score 39.67   [facility] Highest Level Of Mobility   -HL Goal 5: Stand one or more mins   -[facility] Achieved 7: Walk 25 feet or more   Modified Amelia Scale   Modified Amelia Scale 4   Barthel Index   Feeding 10   Bathing 0   Grooming Score 0   Dressing Score 5   Bladder Score 10   Bowels Score 10   Toilet Use Score 5   Transfers (Bed/Chair) Score 10   Mobility (Level Surface) Score 0   Stairs Score 0   Barthel Index Score 50   Additional Treatment Session   Start Time 0919   End Time 0929   Treatment Assessment Pt agreeable to PT treatment session following PT evaluation. Pt performed seated therapeutic exercise as indicated below. Pt required verbal cues for correct technique and form. Pt tolerated therapeutic exercise well without complaints of pain. Pt continues to exhibit decreased lower extremity strength, impaired balance, decreased endurance, gait deviations, and decreased functional mobility. PT to continue to recommend level 2, moderate resource intensity. PT to continue to follow and treat as appropriate.   Exercises   Heelslides Sitting;10 reps;AROM;Bilateral  (long sitting)   Hip Flexion Sitting;20 reps;AROM;Bilateral   Hip Abduction Sitting;20 reps;AROM;Bilateral  (long sitting)   Knee AROM Long Arc Quad Sitting;20 reps;AROM;Bilateral   Ankle Pumps Sitting;20 reps;AROM;Bilateral   End of Consult   Patient Position at End of Consult Bedside chair;Bed/Chair alarm activated;All needs within reach  (sister at bedside; RN aware)     PT Evaluation Time: 0903-0918    PT Treatment Time: 0919-0929  10 minutes  Carole Queen, PT, DPT

## 2024-05-20 NOTE — OCCUPATIONAL THERAPY NOTE
Occupational Therapy Evaluation        Patient Name: Tom Pisano  Today's Date: 5/20/2024 05/20/24 0919   OT Last Visit   OT Visit Date 05/20/24   Note Type   Note type Evaluation   Pain Assessment   Pain Assessment Tool 0-10   Pain Score No Pain   Restrictions/Precautions   Weight Bearing Precautions Per Order No   Other Precautions Cognitive;Chair Alarm;Bed Alarm;Fall Risk   Home Living   Type of Home Apartment   Home Layout One level;Able to live on main level with bedroom/bathroom;Performs ADLs on one level;Elevator  (No BRI)   Bathroom Shower/Tub Tub/shower unit   Bathroom Toilet Standard   Bathroom Equipment Grab bars in shower;Grab bars around toilet   Bathroom Accessibility Accessible   Home Equipment Other (Comment)  (none reported)   Additional Comments Pt ambulates without an AD.   Prior Function   Level of [place] Independent with functional mobility;Independent with ADLs;Needs assistance with IADLS   Lives With Alone   Receives Help From Family;Personal care attendant  (M-F, 8am-5pm )   IADLs Family/Friend/Other provides transportation;Family/Friend/Other provides meals;Family/Friend/Other provides medication management   Falls in the last 6 months 1 to 4  (1 almost fall)   Vocational Retired   Lifestyle   Autonomy Patient  and Sister reported, he lives alone in a one story apartment, no BRI. Patient ambulates without AD, has A- M-F, 8am-5pm    Reciprocal Relationships Supportive Family   ADL   Where Assessed Edge of bed   Eating Assistance 5  Supervision/Setup   Grooming Assistance 5  Supervision/Setup   UB Bathing Assistance 4  Minimal Assistance   LB Bathing Assistance 2  Maximal Assistance   UB Dressing Assistance 4  Minimal Assistance   LB Dressing Assistance 2  Maximal Assistance   Toileting Assistance  3  Moderate Assistance   Bed Mobility   Supine to Sit 4  Minimal assistance   Additional items Assist x 1;HOB elevated;Bedrails;Increased time  required;Verbal cues   Transfers   Sit to Stand 4  Minimal assistance   Additional items Assist x 1;Increased time required;Verbal cues   Stand to Sit 4  Minimal assistance   Additional items Assist x 1;Armrests;Increased time required;Verbal cues   Functional Mobility   Functional Mobility 4  Minimal assistance   Additional Comments assist of 1 with RW   Additional items Rolling walker   Balance   Static Sitting Fair +   Dynamic Sitting Fair   Static Standing Fair -   Dynamic Standing Poor +   Activity Tolerance   Activity Tolerance Patient tolerated treatment well   Medical Staff Made Aware Co-evaluation performed with OT secondary to complex medical condition of patient and regression of functional status from baseline   RUE Assessment   RUE Assessment WFL   LUE Assessment   LUE Assessment WFL   Hand Function   Gross Motor Coordination Impaired   Fine Motor Coordination Functional   Sensation   Light Touch No apparent deficits  (BUEs)   Vision-Basic Assessment   Current Vision Wears glasses all the time   Cognition   Overall Cognitive Status Impaired   Arousal/Participation Alert;Responsive;Cooperative   Attention Within functional limits   Orientation Level Oriented to person;Oriented to place;Disoriented to time;Disoriented to situation   Memory Decreased recall of recent events;Decreased short term memory;Decreased recall of biographical information   Following Commands Follows one step commands without difficulty   Assessment   Limitation Decreased ADL status;Decreased Safe judgement during ADL;Decreased cognition;Decreased endurance;Decreased self-care trans;Decreased high-level ADLs   Prognosis Good   Assessment Patient is a 69 y.o. male seen for OT evaluation s/p admit to [facility] on 5/18/2024 w/Hypotension. Commorbidities affecting patient's functional performance at time of assessment include: history of femorofemoral bypass surgery, hypertension, ANNETTA, chronic systolic heart failure,  metabolic encephalopathy, PAD, abnormal abdominal CT scan, severe sepsis, atrial fibrillation, and positive blood culture.   Orders placed for OT evaluation and treatment.  Performed at least two patient identifiers during session including name and wristband.  Prior to admission, Patient and Sister reported, he lives alone in a one story apartment, no BRI. Patient ambulates without AD, has HHA- M-F, 8am-5pm . Personal factors affecting patient at time of initial evaluation include: limited caregiver support, limited insight into deficits, decreased initiation and engagement, difficulty performing ADLs, and difficulty performing IADLs. Upon evaluation, patient requires minimal  assist for UB ADLs, moderate and maximal assist for LB ADLs.  Occupational performance is affected by the following deficits: orientation, degenerative arthritic joint changes, impaired gross motor coordination, decreased activity tolerance, impaired problem solving, and decreased safety awareness.  Patient to benefit from continued Occupational Therapy treatment while in the hospital to address deficits as defined above and maximize level of functional independence with ADLs and functional mobility. Occupational Performance areas to address include: bathing/ shower, dressing, toilet hygiene, transfer to all surfaces, functional ambulation, functional mobility, health maintenance, medication routine/ management, IADLs: safety procedures, IADLs: meal prep/ clean up, and Leisure Participation. From OT standpoint, recommendation at time of d/c would be Level 2.   Plan   Treatment Interventions ADL retraining;Functional transfer training;UE strengthening/ROM;Endurance training;Patient/family training;Equipment evaluation/education;Compensatory technique education;Continued evaluation;Activityengagement;Energy conservation   Goal Expiration Date 06/03/24   OT Frequency 3-5x/wk   Discharge Recommendation   Rehab Resource Intensity Level,  OT II (Moderate Resource Intensity)   AM-PAC Daily Activity Inpatient   Lower Body Dressing 2   Bathing 2   Toileting 2   Upper Body Dressing 3   Grooming 3   Eating 4   Daily Activity Raw Score 16   Daily Activity Standardized Score (Calc for Raw Score >=11) 35.96   AM-PAC Applied Cognition Inpatient   Following a Speech/Presentation 3   Understanding Ordinary Conversation 3   Taking Medications 2   Remembering Where Things Are Placed or Put Away 3   Remembering List of 4-5 Errands 1   Taking Care of Complicated Tasks 1   Applied Cognition Raw Score 13   Applied Cognition Standardized Score 30.46   Barthel Index   Feeding 10   Bathing 0   Grooming Score 0   Dressing Score 5   Bladder Score 10   Bowels Score 10   Toilet Use Score 5   Transfers (Bed/Chair) Score 10   Mobility (Level Surface) Score 0   Stairs Score 0   Barthel Index Score 50       1 - Patient will verbalize and demonstrate use of energy conservation/ deep breathing technique and work simplification skills during functional activity with no verbal cues.    2 - Patient will verbalize and demonstrate good body mechanics and joint protection techniques during  ADLs/ IADLs with no verbal cues.    3 - Patient will increase OOB/ sitting tolerance to 2-4 hours per day for increased participation in self care and leisure tasks with no s/s of exertion.    4 - Patient will increase standing tolerance time to 5  minutes with unilateral UE support to complete sink level ADLs@ mod I level.    5 - Patient will increase sitting tolerance at edge of bed to 20 minutes to complete UB ADLs @ set up assist level.    6 - Patient will transfer bed to Chair / toilet at Set up assist level with AD as indicated.     7 - Patient will complete UB ADLs with set up assist.     8 - Patient will complete LB ADLs with min assist with the use of adaptive equipment.     9 - Patient will complete toileting hygiene with set up assist/ supervision for thoroughness    10 - Patient/ Family   will demonstrate competency with UE Home Exercise Program.

## 2024-05-20 NOTE — ASSESSMENT & PLAN NOTE
1/2 blood culture + gram positive cocci, unclear if this is true but given concern for severe sepsis will consider true infection at this point  Follow up on blood cultures   Continue with IV ceftriaxone and flagyl. If other blood culture remains negative x 24 like d/c antibiotics as suspect contamination  Monitor fever curve and wbc count

## 2024-05-21 PROBLEM — K76.9 LIVER LESION: Status: ACTIVE | Noted: 2024-05-21

## 2024-05-21 PROBLEM — G93.41 METABOLIC ENCEPHALOPATHY: Status: RESOLVED | Noted: 2023-12-01 | Resolved: 2024-05-21

## 2024-05-21 LAB
AFP-TM SERPL-MCNC: 1.58 NG/ML (ref 0–9)
APTT PPP: 77 SECONDS (ref 23–37)
APTT PPP: 84 SECONDS (ref 23–37)
BACTERIA BLD CULT: ABNORMAL
CANCER AG19-9 SERPL-ACNC: 40 U/ML (ref 0–35)
CEA SERPL-MCNC: 1.2 NG/ML (ref 0–3)
GP B STREP DNA BLD POS QL NAA+NON-PROBE: NOT DETECTED
GRAM STN SPEC: ABNORMAL

## 2024-05-21 PROCEDURE — 82105 ALPHA-FETOPROTEIN SERUM: CPT | Performed by: PHYSICIAN ASSISTANT

## 2024-05-21 PROCEDURE — 99449 NTRPROF PH1/NTRNET/EHR 31/>: CPT

## 2024-05-21 PROCEDURE — 99232 SBSQ HOSP IP/OBS MODERATE 35: CPT | Performed by: INTERNAL MEDICINE

## 2024-05-21 PROCEDURE — 99233 SBSQ HOSP IP/OBS HIGH 50: CPT | Performed by: INTERNAL MEDICINE

## 2024-05-21 PROCEDURE — 82378 CARCINOEMBRYONIC ANTIGEN: CPT | Performed by: PHYSICIAN ASSISTANT

## 2024-05-21 PROCEDURE — 85730 THROMBOPLASTIN TIME PARTIAL: CPT | Performed by: INTERNAL MEDICINE

## 2024-05-21 PROCEDURE — 92610 EVALUATE SWALLOWING FUNCTION: CPT

## 2024-05-21 RX ADMIN — METRONIDAZOLE 500 MG: 500 TABLET ORAL at 05:55

## 2024-05-21 RX ADMIN — SODIUM CHLORIDE 500 ML: 0.9 INJECTION, SOLUTION INTRAVENOUS at 13:26

## 2024-05-21 RX ADMIN — PANTOPRAZOLE SODIUM 40 MG: 40 TABLET, DELAYED RELEASE ORAL at 05:55

## 2024-05-21 RX ADMIN — ATORVASTATIN CALCIUM 80 MG: 40 TABLET, FILM COATED ORAL at 16:22

## 2024-05-21 RX ADMIN — ASPIRIN 81 MG: 81 TABLET, COATED ORAL at 08:57

## 2024-05-21 RX ADMIN — POLYETHYLENE GLYCOL 3350, SODIUM SULFATE ANHYDROUS, SODIUM BICARBONATE, SODIUM CHLORIDE, POTASSIUM CHLORIDE 4000 ML: 236; 22.74; 6.74; 5.86; 2.97 POWDER, FOR SOLUTION ORAL at 16:22

## 2024-05-21 RX ADMIN — CEFTRIAXONE SODIUM 1000 MG: 10 INJECTION, POWDER, FOR SOLUTION INTRAVENOUS at 08:57

## 2024-05-21 RX ADMIN — MIRTAZAPINE 30 MG: 15 TABLET, FILM COATED ORAL at 22:53

## 2024-05-21 RX ADMIN — AMIODARONE HYDROCHLORIDE 200 MG: 200 TABLET ORAL at 07:29

## 2024-05-21 RX ADMIN — HEPARIN SODIUM 10 UNITS/KG/HR: 10000 INJECTION, SOLUTION INTRAVENOUS at 18:31

## 2024-05-21 NOTE — SPEECH THERAPY NOTE
Speech-Language Pathology Bedside Swallow Evaluation        Patient Name: Tom Pisano  Today's Date: 5/21/2024     Problem List  Principal Problem:    Liver lesion  Active Problems:    Status post femorofemoral bypass surgery    Hypertension    Acute kidney injury (HCC)    Chronic systolic heart failure (HCC)    Severe sepsis (HCC)    Atrial fibrillation (HCC)    Positive blood culture       Past Medical History  Past Medical History:   Diagnosis Date    CHF (congestive heart failure) (HCC)     Dementia (HCC)     DVT (deep venous thrombosis) (HCC)     lt leg    Hypertension     Hypotension 05/18/2024       Past Surgical History  Past Surgical History:   Procedure Laterality Date    ABDOMINAL AORTIC ANEURYSM REPAIR, OPEN      repaired x2       Summary/Impressions:    Pt presents with functional oropharyngeal swallow as per bedside observations.  However, family present and reports intake is less with harder textures.  Pt self-feeds trials of thin liquids, puree and mech soft solids today (soft texture is requested by family to maximize intake).  No s/s of aspiration.  Intake appears good.     Recommendations:   Diet: mechanically altered/level 2 diet and thin liquids   Meds:  as preferred/tolerated     Feeding assistance: tray set up   Frequent Oral care: 2-4x/day  Aspiration precautions and compensatory swallowing strategies: upright posture, only feed when fully alert, and slow rate of feeding  Other Recommendations/ considerations: SLP will continue to follow to ensure adequate management of oral diet and adjust as clinically indicated x1-2.   Should pt wish to resume regular/baseline diet; may consider advancement given minimal deficits noted today.      Current Medical Status  Pt is a 69 y.o. male who presented to [facility] with  PMH of CHF, DVT, hypertension, dementia who presents with 4-day history of weakness, worsening loose watery brown diarrhea.  Notes some mild blood.  Denies nausea,  vomiting.  Unintentional weight loss over the past couple weeks to months.  Poor p.o. intake.  Per family noted to be slightly confused.  Patient noted to be hypotensive on EMS in ED responding to IV fluids.  Patient admitted for severe sepsis possibly due to viral gastroenteritis, ANNETTA.     Past medical history:  Please see H&P for details    Special Studies:  CT chest 5/18/24:  1.  Multiple hepatic metastases, developing since 11/30/2023.  2.  Retroperitoneal, peripancreatic and vinicius hepatis lymphadenopathy, also new since 11/30/2023.  3.  Site of primary malignancy unknown although there is subtle suggestion of a 0.7 x 1.1 cm mass at the junction of the pancreatic head and uncinate process. This can be better evaluated with MRI of the abdomen without and with IV contrast, with MRCP.  4.  Aortobifemoral stent graft present with probable complete occlusion of the left iliac limb and probable occlusion of the femoral-femoral bypass graft.  5.  Severe emphysema.  6.  Other than subsegmental atelectasis in the bases of the both lower lobes, no evidence of acute abnormality in the chest.    5/18/24 CT head:  No acute intracranial abnormality.     Social/Education/Vocational Hx:  Pt lives with family    Swallow Information   Current Risks for Dysphagia & Aspiration: AMS  Current Symptoms/Concerns:  poor PO intake  Current Diet: mechanically altered/level 2 diet and thin liquids   Baseline Diet: regular diet and thin liquids    Baseline Assessment   Behavior/Cognition: alert  Speech/Language Status: able to participate in basic conversation and able to follow commands inconsistently  Patient Positioning: upright in bed    Swallow Mechanism Exam   Facial: symmetrical  Labial: WFL  Lingual: WFL  Velum: unable to visualize  Mandible: adequate ROM  Dentition: full dentures  Vocal quality:clear/adequate   Volitional Cough: unable to initiate volitional cough   Respiratory: RA    Consistencies Assessed and Performance    Consistencies Administered: thin liquids, puree, and mechanical soft solids    Oral Stage: Adequate bolus retrieval and containment.  Mastication prolonged yet complete.  Transfer prompt.  No oral retention.     Pharyngeal Stage: Laryngeal rise noted upon palpation.  Swallow initiation appears timely.  No s/s of aspiration.     Esophageal Concerns: none reported      Results Reviewed with: patient, RN, and family     Plan  Will continue to follow for x1-2  Dysphagia Goals: pt will tolerate soft/mech soft with thin without s/s of aspiration x1-2        Estefanía Maldonado MS, CCC-SLP  Speech-Language Pathologist  [place] #[id number]  [place] #[id number]

## 2024-05-21 NOTE — TELEMEDICINE
e-Consult (IPC)  - Interventional Radiology  Tom Pisano 69 y.o. male MRN: [id number]  Unit/Bed#: -01 Encounter: [id number]          Interventional Radiology has been consulted to evaluate Tom Pisano    We were consulted by Gastroenterology concerning this patient with multiple liver lesions.    Inpatient Consult to IR  Consult performed by: GRETEL Reynoso  Consult ordered by: Carole Guzman PA-C        05/21/24    Assessment/Recommendation:   Tom Pisano, 69y male with Dementia, PAD, AAA, Pacemaker, HF, A-fib on Eliquis and ASA, HTN, femorofemoral bypass, ANNETTA, Sepsis and liver lesions.    Patient presented to the ER on 5/18/24 for AMS, decreased appetite, bilateral leg weakness and bloody stool.    Patient was hypotensive upon arrival. Mental status was at baseline.    Patient was found to have ANNETTA - BUN 41 Creatinine 1.59, elevated LFT's and albumin, Procal 1.26, WBC 12.72.  Blood Culture - + Streptococcus agalactiae (Group B)      CT C/A/P w/contrast showed -  1.  Multiple hepatic metastases, developing since 11/30/2023.     2.  Retroperitoneal, peripancreatic and vinicius hepatis lymphadenopathy, also new since 11/30/2023.     3.  Site of primary malignancy unknown although there is subtle suggestion of a 0.7 x 1.1 cm mass at the junction of the pancreatic head and uncinate process. This can be better evaluated with MRI of the abdomen without and with IV contrast, with MRCP.     4.  Aortobifemoral stent graft present with probable complete occlusion of the left iliac limb and probable occlusion of the femoral-femoral bypass graft.    No plans for MRI since unable to obtain records from [place] where stent was placed.    GI planning for EGD and Colonoscopy tomorrow.    GI requesting liver lesion biopsy. Lesions are amenable for biopsy.    Patient currently on Eliquis and ASA.  Last dose of Eliquis yesterday  Last dose of ASA today.    Will need a 5 day HOLD on ASA and 4 dose HOLD  on Eliquis prior to any biopsy.  First available day after hold will be Tuesday 5/28/24.    Will also need INR <1.5   Currently INR is 1.72    Patient currently on Heparin gtt - will need to HOLD 4 hours prior to procedure.    Plan -  Ultrasound guided Liver lesion biopsy - tentatively Tuesday 5/2/24 - timing to be determined by team availability.  HOLD ASA x 5 days.  HOLD Eliquis x 4 doses.  HOLD Heparin gtt 4 hours prior to procedure Tuesday. (Please contact IR to coordinate on timing Tuesday morning)  Pending INR <1.5  Family to consent for patient    If patient to be discharged prior to Tuesday, please contact IR to help coordinate expedited outpatient biopsy.    Feel free to contact IR with any questions or concerns.  Plan discussed with Carole Guzman PA-C via Tiger Text.    31 + minutes, >50% of the total time devoted to medical consultative verbal/EMR discussion between providers. Written report will be generated in the EMR.     Thank you for allowing Interventional Radiology to participate in the care of Tom BRONSON Pisano. Please don't hesitate to call or TigerText us with any questions.     GRETEL Reynoso

## 2024-05-21 NOTE — CASE MANAGEMENT
Case Management Progress Note    Patient name Tom Pisano  Location /-01 MRN [id number]  : 1955 Date 2024       LOS (days): 3  Geometric Mean LOS (GMLOS) (days):   Days to GMLOS:        OBJECTIVE:        Current admission status: Inpatient  Preferred Pharmacy:   [facility]/pharmacy #[id number] - [place], [place] - [address]  [address]  [place] [address]  Phone: [phone] Fax: [phone]    Primary Care Provider: Benedicto Mello MD    Primary Insurance: Issue REP  Secondary Insurance: [facility]    PROGRESS NOTE:    Per rounding with SLIM, patient is going for an EGD/colonoscopy tomorrow. He also has a liver mass and will need a liver biopsy. He is anticipated to be discharged in 48-72 hrs. CM needs TBD, pending full CM assessment. CM department will continue to follow patient through discharge.

## 2024-05-21 NOTE — ASSESSMENT & PLAN NOTE
Multiple hepatic metastases, developing since 11/30/2023. Retroperitoneal, peripancreatic and vinicius hepatis lymphadenopathy, also new since 11/30/2023.Site of primary malignancy unknown although there is subtle suggestion of a 0.7 x 1.1 cm mass at the junction of the pancreatic head and uncinate process. This can be better evaluated with MRI of the abdomen without and with IV contrast, with MRCP    MRI ordered but unclear if patients endovascular prosthesis is compatible with it - apparently this was done by Myron Cho MD in [facility] [phone], will try to obtain medical records formally.  GI consulted, appreciate recommendations - plan for EGD and colonoscopy  Also IR consulted to see if lesion amenable to biopsy.

## 2024-05-21 NOTE — ASSESSMENT & PLAN NOTE
As evidenced by tachycardia and leukocytosis, Chest CT negative for acute infectious etiology. Family reports diarrhea, patient has had no BM since admission.   Patient received IV ceftriaxone  S/p 30 cc/kg Iv fluids, continue with IV fluids  1/2 blood cultures + gram positive cocci in chains    Given positive Bcx and elevated procal will keep on ceftriaxone for now at least until repeat Bcx done

## 2024-05-21 NOTE — ASSESSMENT & PLAN NOTE
1/2 blood culture + gram positive cocci, unclear if this is true but given concern for severe sepsis on admission and elevated procalcitonin will continue antibiotics for now and obtain repeat Bcx.

## 2024-05-21 NOTE — PROGRESS NOTES
[facility]  Progress Note  Name: Tom Pisano I  MRN: [id number]  Unit/Bed#: -01 I Date of Admission: 5/18/2024   Date of Service: 5/21/2024 I Hospital Day: 3    Assessment & Plan   * Liver lesion  Assessment & Plan  Multiple hepatic metastases, developing since 11/30/2023. Retroperitoneal, peripancreatic and vinicius hepatis lymphadenopathy, also new since 11/30/2023.Site of primary malignancy unknown although there is subtle suggestion of a 0.7 x 1.1 cm mass at the junction of the pancreatic head and uncinate process. This can be better evaluated with MRI of the abdomen without and with IV contrast, with MRCP    MRI ordered but unclear if patients endovascular prosthesis is compatible with it - apparently this was done by Myron Cho MD in [facility] [phone], will try to obtain medical records formally.  GI consulted, appreciate recommendations - plan for EGD and colonoscopy  Also IR consulted to see if lesion amenable to biopsy.    Severe sepsis (HCC)  Assessment & Plan  As evidenced by tachycardia and leukocytosis, Chest CT negative for acute infectious etiology. Family reports diarrhea, patient has had no BM since admission.   Patient received IV ceftriaxone  S/p 30 cc/kg Iv fluids, continue with IV fluids  1/2 blood cultures + gram positive cocci in chains    Given positive Bcx and elevated procal will keep on ceftriaxone for now at least until repeat Bcx done    Positive blood culture  Assessment & Plan  1/2 blood culture + gram positive cocci, unclear if this is true but given concern for severe sepsis on admission and elevated procalcitonin will continue antibiotics for now and obtain repeat Bcx.      Atrial fibrillation (HCC)  Assessment & Plan  Hx of a fib on eliquis and amiodarone daily  EKG with NSR and PAC  Given that blood pressure is stable  Will resume po amiodarone    Continue IV heparin instead of Eliquis for now      PAD (peripheral artery disease)  ([facility])  Assessment & Plan  History of CAD, PAD, CVA currently on aspirin Eliquis and statin    Chronic systolic heart failure ([facility])  Assessment & Plan  Wt Readings from Last 3 Encounters:   05/18/24 84.5 kg (186 lb 4.6 oz)   12/04/23 83.5 kg (184 lb)   08/29/23 87.1 kg (192 lb)   Holding home dose Lasix and antihypertensive regimen in setting of hypotension  Hold off on additional IV fluids  Monitor intake and output closely            Acute kidney injury (HCC)  Assessment & Plan  Creatinine 1.59 initially, baseline appears to be around 1.  Suspect prerenal in setting of poor p.o. intake. Improved to 0.7  Avoid nephrotoxic agents  S/p IV fluids  Encouraged increase po intake  Monitor creatinine with a.m. BMP    Hypertension  Assessment & Plan  Holding home dose Lasix 40 mg daily and Coreg 25 mg twice daily entreso in setting of hypotension as above    Status post femorofemoral bypass surgery  Assessment & Plan  Follows with vascular surgery outpatient, Ct showing Aortobifemoral stent graft present with probable complete occlusion of the left iliac limb and probable occlusion of the femoral-femoral bypass graft. Per chart review and family, this is known. Peripheral pulses present.   Close monitoring  Consider inpatient vs outpatient vascular f/u            VTE Pharmacologic Prophylaxis:   Pharmacologic: Heparin Drip  Mechanical VTE Prophylaxis in Place: Yes    Patient Centered Rounds: I have performed bedside rounds with nursing staff today.    Discussions with Specialists or Other Care Team Provider: Discussed with care management team    Education and Discussions with Family / Patient: Discussed with family at bedside    Time Spent for Care: 1 hour.  More than 50% of total time spent on counseling and coordination of care as described above.    Current Length of Stay: 3 day(s)    Current Patient Status: Inpatient   Certification Statement: The patient will continue to require additional inpatient hospital stay due  to need for GI workup    Discharge Plan: 48-72h    Code Status: Level 1 - Full Code      Subjective:     Patient evaluated this AM  Denies any CP, nausea, vomiting  Doing well  Had BM    Objective:     Vitals:   Temp (24hrs), Av.9 °F (36.6 °C), Min:97.3 °F (36.3 °C), Max:98.6 °F (37 °C)    Temp:  [97.3 °F (36.3 °C)-98.6 °F (37 °C)] 97.4 °F (36.3 °C)  HR:  [73-78] 78  Resp:  [16-18] 16  BP: (110-116)/(65-81) 114/70  SpO2:  [93 %-95 %] 93 %  Body mass index is 25.27 kg/m².     Input and Output Summary (last 24 hours):       Intake/Output Summary (Last 24 hours) at 2024 1044  Last data filed at 2024 0900  Gross per 24 hour   Intake 1465.41 ml   Output 450 ml   Net 1015.41 ml       Physical Exam:     Physical Exam  Vitals and nursing note reviewed.   Constitutional:       Appearance: Normal appearance.   HENT:      Head: Normocephalic and atraumatic.      Right Ear: External ear normal.      Left Ear: External ear normal.      Nose: Nose normal. No congestion or rhinorrhea.      Mouth/Throat:      Mouth: Mucous membranes are moist.      Pharynx: Oropharynx is clear. No oropharyngeal exudate or posterior oropharyngeal erythema.   Eyes:      General: No scleral icterus.        Right eye: No discharge.         Left eye: No discharge.      Pupils: Pupils are equal, round, and reactive to light.   Neck:      Vascular: No carotid bruit.   Cardiovascular:      Rate and Rhythm: Normal rate and regular rhythm.      Pulses: Normal pulses.      Heart sounds: No murmur heard.     No friction rub. No gallop.   Pulmonary:      Effort: Pulmonary effort is normal. No respiratory distress.      Breath sounds: Normal breath sounds. No stridor. No wheezing, rhonchi or rales.   Abdominal:      General: Abdomen is flat. Bowel sounds are normal. There is no distension.      Palpations: Abdomen is soft. There is no mass.      Tenderness: There is no abdominal tenderness. There is no guarding or rebound.      Hernia: No hernia is  present.   Musculoskeletal:         General: No swelling, tenderness, deformity or signs of injury. Normal range of motion.      Cervical back: Normal range of motion. No rigidity. No muscular tenderness.   Lymphadenopathy:      Cervical: No cervical adenopathy.   Skin:     General: Skin is warm and dry.      Capillary Refill: Capillary refill takes less than 2 seconds.      Coloration: Skin is not jaundiced or pale.      Findings: No bruising or erythema.   Neurological:      General: No focal deficit present.      Mental Status: He is alert and oriented to person, place, and time. Mental status is at baseline.      Cranial Nerves: No cranial nerve deficit.      Sensory: No sensory deficit.      Motor: No weakness.      Coordination: Coordination normal.      Deep Tendon Reflexes: Reflexes normal.   Psychiatric:         Mood and Affect: Mood normal.         Behavior: Behavior normal.         Thought Content: Thought content normal.         Judgment: Judgment normal.           Additional Data:     Labs:    Results from last 7 days   Lab Units 05/20/24  0512 05/19/24  0558   WBC Thousand/uL 5.95 7.60   HEMOGLOBIN g/dL 12.0 13.3   HEMATOCRIT % 38.0 42.9   PLATELETS Thousands/uL 138* 136*   SEGS PCT %  --  80*   LYMPHO PCT %  --  9*   MONO PCT %  --  10   EOS PCT %  --  0     Results from last 7 days   Lab Units 05/20/24  0512   SODIUM mmol/L 136   POTASSIUM mmol/L 3.9   CHLORIDE mmol/L 107   CO2 mmol/L 21   BUN mg/dL 16   CREATININE mg/dL 0.75   ANION GAP mmol/L 8   CALCIUM mg/dL 7.5*   ALBUMIN g/dL 2.7*   TOTAL BILIRUBIN mg/dL 0.71   ALK PHOS U/L 220*   ALT U/L 15   AST U/L 51*   GLUCOSE RANDOM mg/dL 72     Results from last 7 days   Lab Units 05/20/24  1437   INR  1.72*     Results from last 7 days   Lab Units 05/18/24  0941   POC GLUCOSE mg/dl 108         Results from last 7 days   Lab Units 05/18/24  0954   LACTIC ACID mmol/L 1.9   PROCALCITONIN ng/ml 1.26*           * I Have Reviewed All Lab Data Listed Above.  *  Additional Pertinent Lab Tests Reviewed: All Labs For Current Hospital Admission Reviewed        Recent Cultures (last 7 days):     Results from last 7 days   Lab Units 05/18/24  0955 05/18/24  0954   BLOOD CULTURE  No Growth at 48 hrs. Streptococcus agalactiae (Group B)*   GRAM STAIN RESULT   --  Gram positive cocci in chains*       Last 24 Hours Medication List:   Current Facility-Administered Medications   Medication Dose Route Frequency Provider Last Rate    acetaminophen  650 mg Oral Q6H PRN Marge Echo Saraiya, DO      amiodarone  200 mg Oral Daily With Breakfast Marge Echo Saraiya, DO      aspirin  81 mg Oral Daily Marge Echo Saraiya, DO      atorvastatin  80 mg Oral Daily With Dinner Marge Echo Saraiya, DO      [START ON 5/22/2024] cefTRIAXone  2,000 mg Intravenous Q24H Toby Paz MD      heparin (porcine)  3-20 Units/kg/hr (Order-Specific) Intravenous Titrated Marge Echo Saraiya, DO 10 Units/kg/hr (05/20/24 2215)    mirtazapine  30 mg Oral HS Marge Echo Saraiya, DO      pantoprazole  40 mg Oral Early Morning Marge Echo Saraiya, DO      polyethylene glycol  4,000 mL Oral Once Carole Guzman PA-C      temazepam  15 mg Oral HS PRN Marge Echo Saraiya, DO          Today, Patient Was Seen By: Toby Paz MD    ** Please Note: Dictation voice to text software may have been used in the creation of this document. **

## 2024-05-21 NOTE — PLAN OF CARE
Recommendations:   Diet: mechanically altered/level 2 diet and thin liquids   Meds:  as preferred/tolerated     Feeding assistance: tray set up   Frequent Oral care: 2-4x/day  Aspiration precautions and compensatory swallowing strategies: upright posture, only feed when fully alert, and slow rate of feeding  Other Recommendations/ considerations: SLP will continue to follow to ensure adequate management of oral diet and adjust as clinically indicated x1-2.   Should pt wish to resume regular/baseline diet; may consider advancement given minimal deficits noted today.

## 2024-05-21 NOTE — PROGRESS NOTES
Progress Note - Tom Pisano 69 y.o. male MRN: [id number]    Unit/Bed#: -01 Encounter: [id number]        Subjective:     Patient has no new complaints today.  Discussed care with patient's sister.  Will plan for EGD and colonoscopy tomorrow to investigate since MRI cannot be performed given that the records of his previous stent cannot be accessed from [place].    ROS: As noted in the HPI, otherwise all others negative.    Objective:     Vitals: Blood pressure 114/70, pulse 78, temperature (!) 97.4 °F (36.3 °C), resp. rate 16, height 6' (1.829 m), weight 84.5 kg (186 lb 4.6 oz), SpO2 93%.,Body mass index is 25.27 kg/m².      Intake/Output Summary (Last 24 hours) at 5/21/2024 1039  Last data filed at 5/21/2024 0900  Gross per 24 hour   Intake 1465.41 ml   Output 450 ml   Net 1015.41 ml       Physical Exam:     General Appearance: Alert and oriented x 3. In no respiratory distress  Lungs: Clear to auscultation bilaterally, no rales or rhonchi  Heart: Regular rate and rhythm, S1, S2 normal, no murmur, click, rub or gallop  Abdomen: Soft, non-tender, non-distended; bowel sounds normal; no masses or no organomegaly  Extremities: No cyanosis, edema    Invasive Devices       Peripheral Intravenous Line  Duration             Peripheral IV 05/18/24 Left Antecubital 3 days    Peripheral IV 05/18/24 Right;Ventral (anterior) Forearm 3 days                    Lab Results:  Results from last 7 days   Lab Units 05/20/24  0512 05/19/24  0558   WBC Thousand/uL 5.95 7.60   HEMOGLOBIN g/dL 12.0 13.3   HEMATOCRIT % 38.0 42.9   PLATELETS Thousands/uL 138* 136*   SEGS PCT %  --  80*   LYMPHO PCT %  --  9*   MONO PCT %  --  10   EOS PCT %  --  0     Results from last 7 days   Lab Units 05/20/24  0512   POTASSIUM mmol/L 3.9   CHLORIDE mmol/L 107   CO2 mmol/L 21   BUN mg/dL 16   CREATININE mg/dL 0.75   CALCIUM mg/dL 7.5*   ALK PHOS U/L 220*   ALT U/L 15   AST U/L 51*     Results from last 7 days   Lab Units 05/20/24  1437   INR   1.72*     Results from last 7 days   Lab Units 05/18/24  0954   LIPASE u/L 8*       Imaging Studies: I have personally reviewed pertinent imaging studies.    XR chest 1 view portable    Result Date: 5/18/2024  Impression: No acute cardiopulmonary disease. Workstation performed: [id number]     CT chest abdomen pelvis w contrast    Result Date: 5/18/2024  Impression: 1.  Multiple hepatic metastases, developing since 11/30/2023. 2.  Retroperitoneal, peripancreatic and vinicius hepatis lymphadenopathy, also new since 11/30/2023. 3.  Site of primary malignancy unknown although there is subtle suggestion of a 0.7 x 1.1 cm mass at the junction of the pancreatic head and uncinate process. This can be better evaluated with MRI of the abdomen without and with IV contrast, with MRCP. 4.  Aortobifemoral stent graft present with probable complete occlusion of the left iliac limb and probable occlusion of the femoral-femoral bypass graft. 5.  Severe emphysema. 6.  Other than subsegmental atelectasis in the bases of the both lower lobes, no evidence of acute abnormality in the chest. I personally discussed this study with MAUREEN AMARO on 5/18/2024 12:27 PM. Workstation performed: [id number]     CT head without contrast    Result Date: 5/18/2024  Impression: No acute intracranial abnormality. Workstation performed: [id number]         Assessment and Plan:     1) Abdominal pain and cachexia; abnormal CT imaging with multiple hepatic masses and pancreatic lesion - On admission revealing multiple enlarged lymph nodes developing since November 2023 near the vinicius hepatis and SMA.  In addition, there are several masses within the liver the largest measuring 3 x 5.5 cm.  Bile ducts appear normal in caliber.  No gallstones.  On the pancreas there is a questionable 0.7 x 1.1 cm structure near the uncinate process.  No convincing evidence of intra pancreatic tumor according to report.  Primary malignancy unknown.  There is extensive  "family history of malignancy.  It has been more than 15 years since he had a colonoscopy.  - MRI could not be performed as we cannot access records from his stent that was placed 10 years ago while in [place]  - Discussed with Dr. Taylor, IR consult for potential liver biopsy if EGD and colonoscopy are unrevealing  - EGD and colonoscopy to investigate tomorrow, clear liquid diet for the remainder of today along with bowel prep ordered.  NPO after midnight  - CA 19-9 pending.  CEA and AFP are within normal limits  - Patient and family agree with plan        Portions of the record may have been created with voice recognition software.  Occasional wrong word or \"sound a like\" substitutions may have occurred due to the inherent limitations of voice recognition software.  Read the chart carefully and recognize, using context, where substitutions have occurred.    "

## 2024-05-21 NOTE — ASSESSMENT & PLAN NOTE
Creatinine 1.59 initially, baseline appears to be around 1.  Suspect prerenal in setting of poor p.o. intake. Improved to 0.7  Avoid nephrotoxic agents  S/p IV fluids  Encouraged increase po intake  Monitor creatinine with a.m. BMP

## 2024-05-21 NOTE — ASSESSMENT & PLAN NOTE
Hx of a fib on eliquis and amiodarone daily  EKG with NSR and PAC  Given that blood pressure is stable  Will resume po amiodarone    Continue IV heparin instead of Eliquis for now

## 2024-05-21 NOTE — PLAN OF CARE
Problem: PAIN - ADULT  Goal: Verbalizes/displays adequate comfort level or baseline comfort level  Description: Interventions:  - Encourage patient to monitor pain and request assistance  - Assess pain using appropriate pain scale  - Administer analgesics based on type and severity of pain and evaluate response  - Implement non-pharmacological measures as appropriate and evaluate response  - Consider cultural and social influences on pain and pain management  - Notify physician/advanced practitioner if interventions unsuccessful or patient reports new pain  Outcome: Progressing     Problem: INFECTION - ADULT  Goal: Absence or prevention of progression during hospitalization  Description: INTERVENTIONS:  - Assess and monitor for signs and symptoms of infection  - Monitor lab/diagnostic results  - Monitor all insertion sites, i.e. indwelling lines, tubes, and drains  - Monitor endotracheal if appropriate and nasal secretions for changes in amount and color  - [place] appropriate cooling/warming therapies per order  - Administer medications as ordered  - Instruct and encourage patient and family to use good hand hygiene technique  - Identify and instruct in appropriate isolation precautions for identified infection/condition  Outcome: Progressing  Goal: Absence of fever/infection during neutropenic period  Description: INTERVENTIONS:  - Monitor WBC    Outcome: Progressing     Problem: SAFETY ADULT  Goal: Patient will remain free of falls  Description: INTERVENTIONS:  - Educate patient/family on patient safety including physical limitations  - Instruct patient to call for assistance with activity   - Consult OT/PT to assist with strengthening/mobility   - Keep Call bell within reach  - Keep bed low and locked with side rails adjusted as appropriate  - Keep care items and personal belongings within reach  - Initiate and maintain comfort rounds  - Make Fall Risk Sign visible to staff  - Offer Toileting every 2 Hours,  in advance of need  - Initiate/Maintain bed alarm  - Obtain necessary fall risk management equipment: walker  - Apply yellow socks and bracelet for high fall risk patients  - Consider moving patient to room near nurses station  Outcome: Progressing  Goal: Maintain or return to baseline ADL function  Description: INTERVENTIONS:  -  Assess patient's ability to carry out ADLs; assess patient's baseline for ADL function and identify physical deficits which impact ability to perform ADLs (bathing, care of mouth/teeth, toileting, grooming, dressing, etc.)  - Assess/evaluate cause of self-care deficits   - Assess range of motion  - Assess patient's mobility; develop plan if impaired  - Assess patient's need for assistive devices and provide as appropriate  - Encourage maximum independence but intervene and supervise when necessary  - Involve family in performance of ADLs  - Assess for home care needs following discharge   - Consider OT consult to assist with ADL evaluation and planning for discharge  - Provide patient education as appropriate  Outcome: Progressing  Goal: Maintains/Returns to pre admission functional level  Description: INTERVENTIONS:  - Perform AM-PAC 6 Click Basic Mobility/ Daily Activity assessment daily.  - Set and communicate daily mobility goal to care team and patient/family/caregiver.   - Collaborate with rehabilitation services on mobility goals if consulted  - Perform Range of Motion 2 times a day.  - Reposition patient every 2 hours.  - Dangle patient 2 times a day  - Stand patient 2 times a day  - Ambulate patient 2 times a day  - Out of bed to chair 2 times a day   - Out of bed for meals 3 times a day  - Out of bed for toileting  - Record patient progress and toleration of activity level   Outcome: Progressing

## 2024-05-22 ENCOUNTER — APPOINTMENT (INPATIENT)
Dept: GASTROENTEROLOGY | Facility: HOSPITAL | Age: 69
DRG: 374 | End: 2024-05-22
Payer: COMMERCIAL

## 2024-05-22 ENCOUNTER — ANESTHESIA (INPATIENT)
Dept: GASTROENTEROLOGY | Facility: HOSPITAL | Age: 69
DRG: 374 | End: 2024-05-22
Payer: COMMERCIAL

## 2024-05-22 ENCOUNTER — ANESTHESIA EVENT (INPATIENT)
Dept: GASTROENTEROLOGY | Facility: HOSPITAL | Age: 69
DRG: 374 | End: 2024-05-22
Payer: COMMERCIAL

## 2024-05-22 PROBLEM — N17.9 ACUTE KIDNEY INJURY (HCC): Status: RESOLVED | Noted: 2023-11-30 | Resolved: 2024-05-22

## 2024-05-22 LAB
ALBUMIN SERPL BCP-MCNC: 2.6 G/DL (ref 3.5–5)
ALP SERPL-CCNC: 292 U/L (ref 34–104)
ALT SERPL W P-5'-P-CCNC: 19 U/L (ref 7–52)
ANION GAP SERPL CALCULATED.3IONS-SCNC: 8 MMOL/L (ref 4–13)
APTT PPP: 66 SECONDS (ref 23–37)
AST SERPL W P-5'-P-CCNC: 68 U/L (ref 13–39)
BASOPHILS # BLD AUTO: 0.02 THOUSANDS/ÂΜL (ref 0–0.1)
BASOPHILS NFR BLD AUTO: 0 % (ref 0–1)
BILIRUB SERPL-MCNC: 0.65 MG/DL (ref 0.2–1)
BUN SERPL-MCNC: 12 MG/DL (ref 5–25)
CALCIUM ALBUM COR SERPL-MCNC: 8.7 MG/DL (ref 8.3–10.1)
CALCIUM SERPL-MCNC: 7.6 MG/DL (ref 8.4–10.2)
CHLORIDE SERPL-SCNC: 103 MMOL/L (ref 96–108)
CO2 SERPL-SCNC: 22 MMOL/L (ref 21–32)
CREAT SERPL-MCNC: 0.67 MG/DL (ref 0.6–1.3)
EOSINOPHIL # BLD AUTO: 0.01 THOUSAND/ÂΜL (ref 0–0.61)
EOSINOPHIL NFR BLD AUTO: 0 % (ref 0–6)
ERYTHROCYTE [DISTWIDTH] IN BLOOD BY AUTOMATED COUNT: 18.1 % (ref 11.6–15.1)
GFR SERPL CREATININE-BSD FRML MDRD: 98 ML/MIN/1.73SQ M
GLUCOSE SERPL-MCNC: 72 MG/DL (ref 65–140)
HCT VFR BLD AUTO: 38.4 % (ref 36.5–49.3)
HGB BLD-MCNC: 12.5 G/DL (ref 12–17)
IMM GRANULOCYTES # BLD AUTO: 0.05 THOUSAND/UL (ref 0–0.2)
IMM GRANULOCYTES NFR BLD AUTO: 1 % (ref 0–2)
INR PPP: 1.46 (ref 0.84–1.19)
LYMPHOCYTES # BLD AUTO: 0.81 THOUSANDS/ÂΜL (ref 0.6–4.47)
LYMPHOCYTES NFR BLD AUTO: 12 % (ref 14–44)
MCH RBC QN AUTO: 26.5 PG (ref 26.8–34.3)
MCHC RBC AUTO-ENTMCNC: 32.6 G/DL (ref 31.4–37.4)
MCV RBC AUTO: 82 FL (ref 82–98)
MONOCYTES # BLD AUTO: 0.89 THOUSAND/ÂΜL (ref 0.17–1.22)
MONOCYTES NFR BLD AUTO: 13 % (ref 4–12)
NEUTROPHILS # BLD AUTO: 5.21 THOUSANDS/ÂΜL (ref 1.85–7.62)
NEUTS SEG NFR BLD AUTO: 74 % (ref 43–75)
NRBC BLD AUTO-RTO: 0 /100 WBCS
PLATELET # BLD AUTO: 172 THOUSANDS/UL (ref 149–390)
PMV BLD AUTO: 10.8 FL (ref 8.9–12.7)
POTASSIUM SERPL-SCNC: 3.7 MMOL/L (ref 3.5–5.3)
PROT SERPL-MCNC: 5.5 G/DL (ref 6.4–8.4)
PROTHROMBIN TIME: 18.5 SECONDS (ref 11.6–14.5)
RBC # BLD AUTO: 4.71 MILLION/UL (ref 3.88–5.62)
SODIUM SERPL-SCNC: 133 MMOL/L (ref 135–147)
WBC # BLD AUTO: 6.99 THOUSAND/UL (ref 4.31–10.16)

## 2024-05-22 PROCEDURE — 0DBL8ZZ EXCISION OF TRANSVERSE COLON, VIA NATURAL OR ARTIFICIAL OPENING ENDOSCOPIC: ICD-10-PCS | Performed by: INTERNAL MEDICINE

## 2024-05-22 PROCEDURE — 88341 IMHCHEM/IMCYTCHM EA ADD ANTB: CPT | Performed by: PATHOLOGY

## 2024-05-22 PROCEDURE — 88342 IMHCHEM/IMCYTCHM 1ST ANTB: CPT | Performed by: PATHOLOGY

## 2024-05-22 PROCEDURE — 80053 COMPREHEN METABOLIC PANEL: CPT | Performed by: INTERNAL MEDICINE

## 2024-05-22 PROCEDURE — 85610 PROTHROMBIN TIME: CPT | Performed by: INTERNAL MEDICINE

## 2024-05-22 PROCEDURE — 99233 SBSQ HOSP IP/OBS HIGH 50: CPT | Performed by: INTERNAL MEDICINE

## 2024-05-22 PROCEDURE — 0DB68ZX EXCISION OF STOMACH, VIA NATURAL OR ARTIFICIAL OPENING ENDOSCOPIC, DIAGNOSTIC: ICD-10-PCS | Performed by: INTERNAL MEDICINE

## 2024-05-22 PROCEDURE — 87040 BLOOD CULTURE FOR BACTERIA: CPT | Performed by: INTERNAL MEDICINE

## 2024-05-22 PROCEDURE — 0DB38ZX EXCISION OF LOWER ESOPHAGUS, VIA NATURAL OR ARTIFICIAL OPENING ENDOSCOPIC, DIAGNOSTIC: ICD-10-PCS | Performed by: INTERNAL MEDICINE

## 2024-05-22 PROCEDURE — 88360 TUMOR IMMUNOHISTOCHEM/MANUAL: CPT | Performed by: PATHOLOGY

## 2024-05-22 PROCEDURE — 43239 EGD BIOPSY SINGLE/MULTIPLE: CPT | Performed by: INTERNAL MEDICINE

## 2024-05-22 PROCEDURE — 85730 THROMBOPLASTIN TIME PARTIAL: CPT | Performed by: INTERNAL MEDICINE

## 2024-05-22 PROCEDURE — 88305 TISSUE EXAM BY PATHOLOGIST: CPT | Performed by: PATHOLOGY

## 2024-05-22 PROCEDURE — 45385 COLONOSCOPY W/LESION REMOVAL: CPT | Performed by: INTERNAL MEDICINE

## 2024-05-22 PROCEDURE — 88313 SPECIAL STAINS GROUP 2: CPT | Performed by: PATHOLOGY

## 2024-05-22 PROCEDURE — 85025 COMPLETE CBC W/AUTO DIFF WBC: CPT | Performed by: INTERNAL MEDICINE

## 2024-05-22 PROCEDURE — 0DBN8ZZ EXCISION OF SIGMOID COLON, VIA NATURAL OR ARTIFICIAL OPENING ENDOSCOPIC: ICD-10-PCS | Performed by: INTERNAL MEDICINE

## 2024-05-22 RX ORDER — LIDOCAINE HYDROCHLORIDE 20 MG/ML
INJECTION, SOLUTION EPIDURAL; INFILTRATION; INTRACAUDAL; PERINEURAL AS NEEDED
Status: DISCONTINUED | OUTPATIENT
Start: 2024-05-22 | End: 2024-05-22

## 2024-05-22 RX ORDER — PROPOFOL 10 MG/ML
INJECTION, EMULSION INTRAVENOUS AS NEEDED
Status: DISCONTINUED | OUTPATIENT
Start: 2024-05-22 | End: 2024-05-22

## 2024-05-22 RX ORDER — SODIUM CHLORIDE, SODIUM LACTATE, POTASSIUM CHLORIDE, CALCIUM CHLORIDE 600; 310; 30; 20 MG/100ML; MG/100ML; MG/100ML; MG/100ML
INJECTION, SOLUTION INTRAVENOUS CONTINUOUS PRN
Status: DISCONTINUED | OUTPATIENT
Start: 2024-05-22 | End: 2024-05-22

## 2024-05-22 RX ADMIN — PANTOPRAZOLE SODIUM 40 MG: 40 TABLET, DELAYED RELEASE ORAL at 05:50

## 2024-05-22 RX ADMIN — CEFTRIAXONE SODIUM 2000 MG: 10 INJECTION, POWDER, FOR SOLUTION INTRAVENOUS at 05:50

## 2024-05-22 RX ADMIN — LIDOCAINE HYDROCHLORIDE 100 MG: 20 INJECTION, SOLUTION EPIDURAL; INFILTRATION; INTRACAUDAL; PERINEURAL at 15:16

## 2024-05-22 RX ADMIN — PROPOFOL 30 MG: 10 INJECTION, EMULSION INTRAVENOUS at 15:23

## 2024-05-22 RX ADMIN — MIRTAZAPINE 30 MG: 15 TABLET, FILM COATED ORAL at 20:45

## 2024-05-22 RX ADMIN — PROPOFOL 30 MG: 10 INJECTION, EMULSION INTRAVENOUS at 15:35

## 2024-05-22 RX ADMIN — PROPOFOL 50 MG: 10 INJECTION, EMULSION INTRAVENOUS at 15:18

## 2024-05-22 RX ADMIN — PROPOFOL 30 MG: 10 INJECTION, EMULSION INTRAVENOUS at 15:52

## 2024-05-22 RX ADMIN — ATORVASTATIN CALCIUM 80 MG: 40 TABLET, FILM COATED ORAL at 18:55

## 2024-05-22 RX ADMIN — PROPOFOL 20 MG: 10 INJECTION, EMULSION INTRAVENOUS at 15:45

## 2024-05-22 RX ADMIN — PROPOFOL 30 MG: 10 INJECTION, EMULSION INTRAVENOUS at 15:38

## 2024-05-22 RX ADMIN — PROPOFOL 20 MG: 10 INJECTION, EMULSION INTRAVENOUS at 15:32

## 2024-05-22 RX ADMIN — PROPOFOL 20 MG: 10 INJECTION, EMULSION INTRAVENOUS at 15:42

## 2024-05-22 RX ADMIN — PROPOFOL 130 MG: 10 INJECTION, EMULSION INTRAVENOUS at 15:16

## 2024-05-22 RX ADMIN — SODIUM CHLORIDE, SODIUM LACTATE, POTASSIUM CHLORIDE, AND CALCIUM CHLORIDE: .6; .31; .03; .02 INJECTION, SOLUTION INTRAVENOUS at 15:12

## 2024-05-22 RX ADMIN — PROPOFOL 20 MG: 10 INJECTION, EMULSION INTRAVENOUS at 15:29

## 2024-05-22 RX ADMIN — AMIODARONE HYDROCHLORIDE 200 MG: 200 TABLET ORAL at 08:36

## 2024-05-22 RX ADMIN — PROPOFOL 30 MG: 10 INJECTION, EMULSION INTRAVENOUS at 15:26

## 2024-05-22 RX ADMIN — PROPOFOL 30 MG: 10 INJECTION, EMULSION INTRAVENOUS at 15:48

## 2024-05-22 RX ADMIN — TEMAZEPAM 15 MG: 15 CAPSULE ORAL at 20:45

## 2024-05-22 RX ADMIN — PROPOFOL 30 MG: 10 INJECTION, EMULSION INTRAVENOUS at 15:56

## 2024-05-22 RX ADMIN — PROPOFOL 50 MG: 10 INJECTION, EMULSION INTRAVENOUS at 15:20

## 2024-05-22 NOTE — ANESTHESIA PREPROCEDURE EVALUATION
Procedure:  EGD  COLONOSCOPY    Relevant Problems   ANESTHESIA (within normal limits)      CARDIO   (+) Atrial fibrillation (HCC)   (+) Hypertension      GI/HEPATIC  NPO confirmed  BMI 25.3   (+) Liver lesion      HEMATOLOGY (within normal limits)      PULMONARY (within normal limits)  Denies respiratory symptoms   (-) URI (upper respiratory infection)     Allergies   Allergen Reactions    Shellfish-Derived Products - Food Allergy Hives     Social History     Tobacco Use    Smoking status: Former     Current packs/day: 0.00     Types: Cigarettes     Quit date: 1970     Years since quittin.4    Smokeless tobacco: Never   Vaping Use    Vaping status: Never Used   Substance Use Topics    Alcohol use: Never    Drug use: Never     Current Outpatient Medications   Medication Instructions    amiodarone 200 mg, Oral, Daily    aspirin (ECOTRIN LOW STRENGTH) 81 mg, Oral, Daily    atorvastatin (LIPITOR) 80 mg tablet No dose, route, or frequency recorded.    carvedilol (COREG) 25 mg tablet No dose, route, or frequency recorded.    Eliquis 5 MG No dose, route, or frequency recorded.    Entresto 49-51 MG TABS No dose, route, or frequency recorded.    furosemide (LASIX) 40 mg tablet No dose, route, or frequency recorded.    Klor-Con M20 20 MEQ tablet No dose, route, or frequency recorded.    mirtazapine (REMERON) 30 mg tablet No dose, route, or frequency recorded.    pantoprazole (PROTONIX) 40 mg tablet No dose, route, or frequency recorded.    temazepam (RESTORIL) 30 mg capsule TAKE 1 TABLET AT BEDTIME WHEN NECESSARY FOR SLEEP     Lab Results   Component Value Date    WBC 6.99 2024    HGB 12.5 2024    HCT 38.4 2024     2024    SODIUM 133 (L) 2024    K 3.7 2024     2024    CO2 22 2024    BUN 12 2024    CREATININE 0.67 2024    GLUC 72 2024    AST 68 (H) 2024    ALT 19 2024    ALKPHOS 292 (H) 2024    TBILI 0.65 2024    ALB 2.6  (L) 05/22/2024    PROTIME 18.5 (H) 05/22/2024    PTT 66 (H) 05/22/2024    INR 1.46 (H) 05/22/2024     Vitals:    05/22/24 1404   BP: 140/80   Pulse: 90   Resp: 18   Temp: 98.1 °F (36.7 °C)   SpO2: 97%     Liver lesion  Assessment & Plan  Multiple hepatic metastases, developing since 11/30/2023. Retroperitoneal, peripancreatic and vinicius hepatis lymphadenopathy, also new since 11/30/2023.Site of primary malignancy unknown although there is subtle suggestion of a 0.7 x 1.1 cm mass at the junction of the pancreatic head and uncinate process. This can be better evaluated with MRI of the abdomen without and with IV contrast, with MRCP  MRI ordered but unclear if patients endovascular prosthesis is compatible with it - apparently this was done by Myron Cho MD in [facility]   GI consulted, appreciate recommendations - plan for EGD and colonoscopy today  IR input noted and appreciated - they think lesion is potentially amenable for biopsy but Aspirin needs a 5 day hold - last dose 05/21  Given that patient is chronically on aspirin and has cardiovascular issues, also chronically on Eliquis previously (now on heparin gtt) and with fluctuating INR it would be hard to arrange his biopsy OP logistically, furthermore we are also waiting for repeat Bcx. Would rather pursue this inpatient given the same.  [facility] GI scope reports.    Severe sepsis (HCC)  Assessment & Plan  As evidenced by tachycardia and leukocytosis, Chest CT negative for acute infectious etiology. Family reports diarrhea, patient has had no BM since admission.   Patient received IV ceftriaxone  S/p 30 cc/kg Iv fluids, continue with IV fluids  1/2 blood cultures + gram positive cocci in chains  Given positive Bcx and elevated procal will keep on high dose ceftriaxone for now at least until repeat Bcx done - collected 5/22 and I expect to follow these results until finalized     Positive blood culture  Assessment & Plan  1/2 blood culture + gram positive cocci,  unclear if this is true but given concern for severe sepsis on admission and elevated procalcitonin will continue antibiotic  Follow repeat CX     Atrial fibrillation (HCC)  Assessment & Plan  Hx of a fib on eliquis and amiodarone daily  EKG with NSR and PAC  Given that blood pressure is stable  Continue PO amiodarone  Continue IV heparin instead of Eliquis for now given need for procedures    Chronic systolic heart failure (HCC)  Assessment & Plan  Holding home dose Lasix and antihypertensive regimen in setting of hypotension  Hold off on additional IV fluids  Monitor intake and output closely     Hypertension  Assessment & Plan  Holding home dose Lasix 40 mg daily and Coreg 25 mg twice daily entreso in setting of hypotension as above     Status post femorofemoral bypass surgery  Assessment & Plan  Follows with vascular surgery outpatient, Ct showing Aortobifemoral stent graft present with probable complete occlusion of the left iliac limb and probable occlusion of the femoral-femoral bypass graft. Per chart review and family, this is known. Peripheral pulses present.   Patient denies claudication  Close monitoring  OP vascular follow up     Acute kidney injury (HCC)-resolved as of 2024  Assessment & Plan  Creatinine 1.59 initially, baseline appears to be around 1.  Suspect prerenal in setting of poor p.o. intake. Improved to 0.7  Avoid nephrotoxic agents  S/p IV fluids  Encouraged increase po intake  Monitor creatinine with a.m. BMP    Allergies   Allergen Reactions    Shellfish-Derived Products - Food Allergy Hives     Social History     Tobacco Use    Smoking status: Former     Current packs/day: 0.00     Types: Cigarettes     Quit date: 1970     Years since quittin.4    Smokeless tobacco: Never   Vaping Use    Vaping status: Never Used   Substance Use Topics    Alcohol use: Never    Drug use: Never     Current Outpatient Medications   Medication Instructions    amiodarone 200 mg, Oral, Daily     aspirin (ECOTRIN LOW STRENGTH) 81 mg, Oral, Daily    atorvastatin (LIPITOR) 80 mg tablet No dose, route, or frequency recorded.    carvedilol (COREG) 25 mg tablet No dose, route, or frequency recorded.    Eliquis 5 MG No dose, route, or frequency recorded.    Entresto 49-51 MG TABS No dose, route, or frequency recorded.    furosemide (LASIX) 40 mg tablet No dose, route, or frequency recorded.    Klor-Con M20 20 MEQ tablet No dose, route, or frequency recorded.    mirtazapine (REMERON) 30 mg tablet No dose, route, or frequency recorded.    pantoprazole (PROTONIX) 40 mg tablet No dose, route, or frequency recorded.    temazepam (RESTORIL) 30 mg capsule TAKE 1 TABLET AT BEDTIME WHEN NECESSARY FOR SLEEP     Lab Results   Component Value Date    WBC 6.99 05/22/2024    HGB 12.5 05/22/2024    HCT 38.4 05/22/2024     05/22/2024    SODIUM 133 (L) 05/22/2024    K 3.7 05/22/2024     05/22/2024    CO2 22 05/22/2024    BUN 12 05/22/2024    CREATININE 0.67 05/22/2024    GLUC 72 05/22/2024    AST 68 (H) 05/22/2024    ALT 19 05/22/2024    ALKPHOS 292 (H) 05/22/2024    TBILI 0.65 05/22/2024    ALB 2.6 (L) 05/22/2024    PROTIME 18.5 (H) 05/22/2024    PTT 66 (H) 05/22/2024    INR 1.46 (H) 05/22/2024     Vitals:    05/22/24 1404   BP: 140/80   Pulse: 90   Resp: 18   Temp: 98.1 °F (36.7 °C)   SpO2: 97%     Echo 12/4/23    Left Ventricle: Left ventricular cavity size is normal. Wall thickness is mildly increased. There is mild concentric hypertrophy. The left ventricular ejection fraction is 60%. Systolic function is normal. Although no diagnostic regional wall motion abnormality was identified, this possibility cannot be completely excluded on the basis of this study. Diastolic function is normal.    Left Atrium: The atrium is mildly dilated.    Right Atrium: The atrium is mildly dilated.    Tricuspid Valve: There is mild regurgitation. Pacer lead noted.    Aorta: The aortic root is mildly dilated 4.1 cm. The ascending  aorta is mildly dilated.4.2cm.    CT C/A/P 5/18/24  IMPRESSION:     1.  Multiple hepatic metastases, developing since 11/30/2023.     2.  Retroperitoneal, peripancreatic and vinicius hepatis lymphadenopathy, also new since 11/30/2023.     3.  Site of primary malignancy unknown although there is subtle suggestion of a 0.7 x 1.1 cm mass at the junction of the pancreatic head and uncinate process. This can be better evaluated with MRI of the abdomen without and with IV contrast, with MRCP.     4.  Aortobifemoral stent graft present with probable complete occlusion of the left iliac limb and probable occlusion of the femoral-femoral bypass graft.     5.  Severe emphysema.     6.  Other than subsegmental atelectasis in the bases of the both lower lobes, no evidence of acute abnormality in the chest.    Physical Exam    Airway    Mallampati score: III  TM Distance: >3 FB  Neck ROM: full     Dental   Comment: Denies loose/chipped teeth, No notable dental hx     Cardiovascular  Rhythm: regular, Rate: normal, Cardiovascular exam normal    Pulmonary  Pulmonary exam normal Breath sounds clear to auscultation    Other Findings        Anesthesia Plan  ASA Score- 3     Anesthesia Type- IV sedation with anesthesia with ASA Monitors.         Additional Monitors:     Airway Plan:     Comment: O2 mask, natural airway, EtCO2 monitor. Risks discussed including awareness, aspiration, drug reactions and conversion to GA..       Plan Factors-Exercise tolerance (METS): <4 METS.    Chart reviewed.  Imaging results reviewed. Existing labs reviewed. Patient summary reviewed.    Patient is not a current smoker.              Induction- intravenous.    Postoperative Plan-     Perioperative Resuscitation Plan - Level 1 - Full Code.       Informed Consent- Anesthetic plan and risks discussed with patient and sibling.  I personally reviewed this patient with the CRNA. Discussed and agreed on the Anesthesia Plan with the CRNA..    Medication  Administration - last 24 hours from 05/21/2024 1415 to 05/22/2024 1415         Date/Time Order Dose Route Action Action by     05/21/2024 1622 EDT atorvastatin (LIPITOR) tablet 80 mg 80 mg Oral Given Gideon Flores RN     05/21/2024 2253 EDT mirtazapine (REMERON) tablet 30 mg 30 mg Oral Given Bakari Suggs RN     05/22/2024 0550 EDT pantoprazole (PROTONIX) EC tablet 40 mg 40 mg Oral Given Bakari Suggs RN     05/22/2024 0836 EDT amiodarone tablet 200 mg 200 mg Oral Given Gideon Flores RN     05/22/2024 0554 EDT heparin (porcine) 25,000 units in 0.45% NaCl 250 mL infusion (premix) 0 Units/kg/hr Intravenous Stopped Bakari Suggs RN     05/21/2024 1831 EDT heparin (porcine) 25,000 units in 0.45% NaCl 250 mL infusion (premix) 10 Units/kg/hr Intravenous New Bag Gideon Flores RN     05/21/2024 1622 EDT polyethylene glycol (GOLYTELY) bowel prep 4,000 mL 4,000 mL Oral Given Gideon Flores RN     05/22/2024 0550 EDT cefTRIAXone (ROCEPHIN) 2,000 mg in dextrose 5 % 50 mL IVPB 2,000 mg Intravenous New Bag Bakari Suggs RN

## 2024-05-22 NOTE — ASSESSMENT & PLAN NOTE
As evidenced by tachycardia and leukocytosis, Chest CT negative for acute infectious etiology. Family reports diarrhea, patient has had no BM since admission.   Patient received IV ceftriaxone  S/p 30 cc/kg Iv fluids, continue with IV fluids  1/2 blood cultures + gram positive cocci in chains    Given positive Bcx and elevated procal will keep on high dose ceftriaxone for now at least until repeat Bcx done - collected 5/22 and I expect to follow these results until finalized

## 2024-05-22 NOTE — CASE MANAGEMENT
Case Management Assessment & Discharge Planning Note    Patient name Tom Pisano  Location /-01 MRN [id number]  : 1955 Date 2024       Current Admission Date: 2024  Current Admission Diagnosis:Liver lesion   Patient Active Problem List    Diagnosis Date Noted Date Diagnosed    Liver lesion 2024     Positive blood culture 2024     Abnormal abdominal CT scan 2024     Severe sepsis (HCC) 2024     Atrial fibrillation (HCC) 2024     PAD (peripheral artery disease) (HCC) 2023     RSV infection 2023     Chronic systolic heart failure (HCC) 2023     Status post endovascular aneurysm repair (EVAR) 2023     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) 2023     Status post femorofemoral bypass surgery 2023     Cardiomyopathy ([facility]) 2023     Hypertension 2023     Bilateral leg edema 2023       LOS (days): 4  Geometric Mean LOS (GMLOS) (days): 5.1  Days to GMLOS:1     OBJECTIVE:    Risk of Unplanned Readmission Score: 16.66         Current admission status: Inpatient       Preferred Pharmacy:   [facility]/pharmacy #[id number] - [place] - [address]  [address]  [place] [address]  Phone: [phone] Fax: [phone]    [facility] [address] [address]  Phone: [phone] Fax: [phone]    Primary Care Provider: Benedicto Mello MD    Primary Insurance: [id number]  Secondary Insurance: [facility]    ASSESSMENT:  Active Health Care Proxies       Nayla Bolton Health Care Agent - Sister   Primary Phone: [phone] (Home)                 Advance Directives  Does patient have a Health Care POA?: Yes  Does patient have Advance Directives?: Yes  Advance Directives: Power of  for health care, Power of  for finance, Living will  Primary Contact: Patient's  Sister (Nayla)    Readmission Root Cause  30 Day Readmission: No    Patient Information  Admitted from:: Home  Mental Status: Alert  During Assessment patient was accompanied by: Sister  Assessment information provided by:: Patient, Sister  Primary Caregiver: Other (Comment)  Caregiver's Name:: Caregivers through [facility]  Caregiver's Relationship to Patient:: Other (Specify)  Support Systems: Self, Family members, Home care staff  County of Residence: [place]  What [place] you [place] in[place]  Home entry access options. Select all that apply.: Elevator  Type of Current Residence: Apartment  Floor Level: 3  Upon entering residence, is there a bedroom on the main floor (no further steps)?: Yes  Upon entering residence, is there a bathroom on the main floor (no further steps)?: Yes  Living Arrangements: Lives Alone  Is patient a ?: No    Activities of Daily Living Prior to Admission  Functional Status: Independent  Completes ADLs independently?: Yes (Patient does have an aide that comes in Monday through Friday from 8 am to 5 pm to assist with some things around the home.)  Ambulates independently?: Yes  Does patient use assisted devices?: No  Does patient currently own DME?: No  Does patient have a history of Outpatient Therapy (PT/OT)?: No  Does the patient have a history of Short-Term Rehab?: No  Does patient have a history of HHC?: No  Does patient currently have HHC?: Yes    Current Home Health Care  Type of Current Home Care Services: Home health aide  Current Home Health Agency:: Other (please enter name in comment) ([facility])  Current Home Health Follow-Up Provider:: PCP    Patient Information Continued  Income Source: Pension/senior living  Does patient have prescription coverage?: Yes (Patient confirmed that he uses [facility] in [place], and he denied any barriers to obtaining or affording prescriptions.)  Does patient receive dialysis treatments?: No  Does patient have a history of  substance abuse?: No  Does patient have a history of Mental Health Diagnosis?: No    Means of Transportation  Means of Transport to Appts:: Family transport    Social Determinants of Health (SDOH)      Flowsheet Row Most Recent Value   Housing Stability    In the last 12 months, was there a time when you were not able to pay the mortgage or rent on time? N   In the past 12 months, how many times have you moved where you were living? 0   At any time in the past 12 months, were you homeless or living in a shelter (including now)? N   Transportation Needs    In the past 12 months, has lack of transportation kept you from medical appointments or from getting medications? no   In the past 12 months, has lack of transportation kept you from meetings, work, or from getting things needed for daily living? No   Food Insecurity    Within the past 12 months, you worried that your food would run out before you got the money to buy more. Never true   Within the past 12 months, the food you bought just didn't last and you didn't have money to get more. Never true   Utilities    In the past 12 months has the electric, gas, oil, or water company threatened to shut off services in your home? No          DISCHARGE DETAILS:    Discharge planning discussed with:: Patient and Patient's Two Sisters  [place] of Choice: Yes  Comments - Freedom of Choice: CM discussed FOC as it pertains to discharge planning. CM reviewed PT/OT Recommendation of Level II, Moderate Resources. Patient agreeable and he denied any preferences as to where he wishes to go. Patient's sisters reported that patient would likely do better in an ARC, because he was IPTA and has several services in place already at home. They also denied any other preferences, aside from ARC if appropriate.  CM contacted family/caregiver?: Yes  Were Treatment Team discharge recommendations reviewed with patient/caregiver?: Yes  Did patient/caregiver verbalize understanding of patient  care needs?: N/A- going to facility  Were patient/caregiver advised of the risks associated with not following Treatment Team discharge recommendations?: Yes    Contacts  Patient Contacts: Nayla  Relationship to Patient:: Family  Contact Method: In Person  Reason/Outcome: Continuity of Care, Discharge Planning    Requested Home Health Care         Is the patient interested in HHC at discharge?: No    DME Referral Provided  Referral made for DME?: No    Other Referral/Resources/Interventions Provided:  Interventions: Acute Rehab, Short Term Rehab  Referral Comments: CM sent a blanket referral to both ARCs and SNFs to determine who can accept. Responses pending in [facility].    Would you like to participate in our [facility] service program?  : No - Declined    Treatment Team Recommendation: Short Term Rehab  Discharge Destination Plan:: Short Term Rehab, Acute Rehab  Transport at Discharge : [facility]  Dispatcher Contacted: No

## 2024-05-22 NOTE — QUICK NOTE
Interventional Radiology Quick Note     Received TT message from [place] requesting to cancel Liver biopsy request scheduled for Tuesday 5/28 as there are new findings from EGD procedure today. Will cancel request for Liver biopsy.     Please do not hesitate to reach out to IR for concerns/questions or needs. Thank you.    GRETEL Driver

## 2024-05-22 NOTE — ASSESSMENT & PLAN NOTE
Follows with vascular surgery outpatient, Ct showing Aortobifemoral stent graft present with probable complete occlusion of the left iliac limb and probable occlusion of the femoral-femoral bypass graft. Per chart review and family, this is known. Peripheral pulses present.   Patient denies claudication  Close monitoring  OP vascular follow up

## 2024-05-22 NOTE — TELEPHONE ENCOUNTER
RvB done with Dr. Mayo. Unknown stent graft. OK for MRI however Radiologist is recommending CT pending results of the scope and biopsy. Message relayed directly to Dr. Oliva.

## 2024-05-22 NOTE — ASSESSMENT & PLAN NOTE
1/2 blood culture + gram positive cocci, unclear if this is true but given concern for severe sepsis on admission and elevated procalcitonin will continue antibiotic    Follow repeat CX

## 2024-05-22 NOTE — ASSESSMENT & PLAN NOTE
Multiple hepatic metastases, developing since 11/30/2023. Retroperitoneal, peripancreatic and vinicius hepatis lymphadenopathy, also new since 11/30/2023.Site of primary malignancy unknown although there is subtle suggestion of a 0.7 x 1.1 cm mass at the junction of the pancreatic head and uncinate process. This can be better evaluated with MRI of the abdomen without and with IV contrast, with MRCP  MRI ordered but unclear if patients endovascular prosthesis is compatible with it - apparently this was done by Myron Cho MD in [facility]     GI consulted, appreciate recommendations - plan for EGD and colonoscopy today  IR input noted and appreciated - they think lesion is potentially amenable for biopsy but Aspirin needs a 5 day hold - last dose 05/21  Given that patient is chronically on aspirin and has cardiovascular issues, also chronically on Eliquis previously (now on heparin gtt) and with fluctuating INR it would be hard to arrange his biopsy OP logistically, furthermore we are also waiting for repeat Bcx. Would rather pursue this inpatient given the same.  [facility] scope reports.

## 2024-05-22 NOTE — PLAN OF CARE
Problem: CARDIOVASCULAR - ADULT  Goal: Maintains optimal cardiac output and hemodynamic stability  Description: INTERVENTIONS:  - Monitor I/O, vital signs and rhythm  - Monitor for S/S and trends of decreased cardiac output  - Administer and titrate ordered vasoactive medications to optimize hemodynamic stability  - Assess quality of pulses, skin color and temperature  - Assess for signs of decreased coronary artery perfusion  - Instruct patient to report change in severity of symptoms  Outcome: Progressing     Problem: CARDIOVASCULAR - ADULT  Goal: Absence of cardiac dysrhythmias or at baseline rhythm  Description: INTERVENTIONS:  - Continuous cardiac monitoring, vital signs, obtain 12 lead EKG if ordered  - Administer antiarrhythmic and heart rate control medications as ordered  - Monitor electrolytes and administer replacement therapy as ordered  5/22/2024 0036 by Bakari Suggs RN  Outcome: Progressing  5/22/2024 0036 by Bakari Suggs RN  Outcome: Progressing     Problem: PAIN - ADULT  Goal: Verbalizes/displays adequate comfort level or baseline comfort level  Description: Interventions:  - Encourage patient to monitor pain and request assistance  - Assess pain using appropriate pain scale  - Administer analgesics based on type and severity of pain and evaluate response  - Implement non-pharmacological measures as appropriate and evaluate response  - Consider cultural and social influences on pain and pain management  - Notify physician/advanced practitioner if interventions unsuccessful or patient reports new pain  5/22/2024 0036 by Bakari Suggs RN  Outcome: Progressing  5/22/2024 0036 by Bakari Suggs RN  Outcome: Progressing     Problem: INFECTION - ADULT  Goal: Absence or prevention of progression during hospitalization  Description: INTERVENTIONS:  - Assess and monitor for signs and symptoms of infection  - Monitor lab/diagnostic results  - Monitor all insertion sites, i.e.  indwelling lines, tubes, and drains  - Monitor endotracheal if appropriate and nasal secretions for changes in amount and color  - [place] appropriate cooling/warming therapies per order  - Administer medications as ordered  - Instruct and encourage patient and family to use good hand hygiene technique  - Identify and instruct in appropriate isolation precautions for identified infection/condition  Outcome: Progressing

## 2024-05-22 NOTE — ANESTHESIA POSTPROCEDURE EVALUATION
Post-Op Assessment Note    CV Status:  Stable  Pain Score: 0    Pain management: adequate       Mental Status:  Alert and awake   Hydration Status:  Euvolemic   PONV Controlled:  Controlled   Airway Patency:  Patent and adequate  Two or more mitigation strategies used for obstructive sleep apnea   Post Op Vitals Reviewed: Yes    No anethesia notable event occurred.    Staff: CRNA               BP   104/61   Temp      Pulse 78   Resp 20   SpO2 95

## 2024-05-22 NOTE — PROGRESS NOTES
[facility]  Progress Note  Name: Tom Pisano I  MRN: [id number]  Unit/Bed#: -01 I Date of Admission: 5/18/2024   Date of Service: 5/22/2024 I Hospital Day: 4    Assessment & Plan   * Liver lesion  Assessment & Plan  Multiple hepatic metastases, developing since 11/30/2023. Retroperitoneal, peripancreatic and vinicius hepatis lymphadenopathy, also new since 11/30/2023.Site of primary malignancy unknown although there is subtle suggestion of a 0.7 x 1.1 cm mass at the junction of the pancreatic head and uncinate process. This can be better evaluated with MRI of the abdomen without and with IV contrast, with MRCP  MRI ordered but unclear if patients endovascular prosthesis is compatible with it - apparently this was done by Myron Cho MD in [facility]     GI consulted, appreciate recommendations - plan for EGD and colonoscopy today  IR input noted and appreciated - they think lesion is potentially amenable for biopsy but Aspirin needs a 5 day hold - last dose 05/21  Given that patient is chronically on aspirin and has cardiovascular issues, also chronically on Eliquis previously (now on heparin gtt) and with fluctuating INR it would be hard to arrange his biopsy OP logistically, furthermore we are also waiting for repeat Bcx. Would rather pursue this inpatient given the same.  [facility] scope reports.      Severe sepsis (HCC)  Assessment & Plan  As evidenced by tachycardia and leukocytosis, Chest CT negative for acute infectious etiology. Family reports diarrhea, patient has had no BM since admission.   Patient received IV ceftriaxone  S/p 30 cc/kg Iv fluids, continue with IV fluids  1/2 blood cultures + gram positive cocci in chains    Given positive Bcx and elevated procal will keep on high dose ceftriaxone for now at least until repeat Bcx done - collected 5/22 and I expect to follow these results until finalized    Positive blood culture  Assessment & Plan  1/2 blood culture +  gram positive cocci, unclear if this is true but given concern for severe sepsis on admission and elevated procalcitonin will continue antibiotic    Follow repeat CX      Atrial fibrillation (HCC)  Assessment & Plan  Hx of a fib on eliquis and amiodarone daily  EKG with NSR and PAC  Given that blood pressure is stable    Continue PO amiodarone  Continue IV heparin instead of Eliquis for now given need for procedures      Chronic systolic heart failure (HCC)  Assessment & Plan  Wt Readings from Last 3 Encounters:   05/18/24 84.5 kg (186 lb 4.6 oz)   12/04/23 83.5 kg (184 lb)   08/29/23 87.1 kg (192 lb)   Holding home dose Lasix and antihypertensive regimen in setting of hypotension  Hold off on additional IV fluids  Monitor intake and output closely            Hypertension  Assessment & Plan  Holding home dose Lasix 40 mg daily and Coreg 25 mg twice daily entreso in setting of hypotension as above    Status post femorofemoral bypass surgery  Assessment & Plan  Follows with vascular surgery outpatient, Ct showing Aortobifemoral stent graft present with probable complete occlusion of the left iliac limb and probable occlusion of the femoral-femoral bypass graft. Per chart review and family, this is known. Peripheral pulses present.   Patient denies claudication  Close monitoring  OP vascular follow up    Acute kidney injury (HCC)-resolved as of 5/22/2024  Assessment & Plan  Creatinine 1.59 initially, baseline appears to be around 1.  Suspect prerenal in setting of poor p.o. intake. Improved to 0.7  Avoid nephrotoxic agents  S/p IV fluids  Encouraged increase po intake  Monitor creatinine with a.m. BMP           VTE Pharmacologic Prophylaxis:   Pharmacologic: Heparin Drip  Mechanical VTE Prophylaxis in Place: Yes    Patient Centered Rounds: I have performed bedside rounds with nursing staff today.    Discussions with Specialists or Other Care Team Provider: Discussed with care management team    Education and  Discussions with Family / Patient: Discussed with wife    Time Spent for Care: 1 hour.  More than 50% of total time spent on counseling and coordination of care as described above.    Current Length of Stay: 4 day(s)    Current Patient Status: Inpatient   Certification Statement: The patient will continue to require additional inpatient hospital stay due to need for IV therapy    Discharge Plan: 72h +    Code Status: Level 1 - Full Code      Subjective:     Patient evaluated this morning  Denies any CP, nausea, vomiting      Objective:     Vitals:   Temp (24hrs), Av.8 °F (36.6 °C), Min:97.2 °F (36.2 °C), Max:98.3 °F (36.8 °C)    Temp:  [97.2 °F (36.2 °C)-98.3 °F (36.8 °C)] 97.2 °F (36.2 °C)  HR:  [74-88] 77  Resp:  [20-26] 26  BP: (104-127)/(68-77) 111/69  SpO2:  [92 %-94 %] 93 %  Body mass index is 25.27 kg/m².     Input and Output Summary (last 24 hours):       Intake/Output Summary (Last 24 hours) at 2024 1037  Last data filed at 2024 0819  Gross per 24 hour   Intake 0 ml   Output 200 ml   Net -200 ml       Physical Exam:     Physical Exam  Vitals and nursing note reviewed.   Constitutional:       Appearance: Normal appearance.      Comments: Male patient in bed, awake   HENT:      Head: Normocephalic and atraumatic.      Right Ear: External ear normal.      Left Ear: External ear normal.      Nose: Nose normal. No congestion or rhinorrhea.      Mouth/Throat:      Mouth: Mucous membranes are moist.      Pharynx: Oropharynx is clear. No oropharyngeal exudate or posterior oropharyngeal erythema.   Eyes:      General: No scleral icterus.        Right eye: No discharge.         Left eye: No discharge.      Pupils: Pupils are equal, round, and reactive to light.   Neck:      Vascular: No carotid bruit.   Cardiovascular:      Rate and Rhythm: Normal rate and regular rhythm.      Pulses: Normal pulses.      Heart sounds: No murmur heard.     No friction rub. No gallop.   Pulmonary:      Effort: Pulmonary  effort is normal. No respiratory distress.      Breath sounds: Normal breath sounds. No stridor. No wheezing, rhonchi or rales.   Abdominal:      General: Abdomen is flat. Bowel sounds are normal. There is no distension.      Palpations: Abdomen is soft. There is no mass.      Tenderness: There is no abdominal tenderness. There is no guarding or rebound.      Hernia: No hernia is present.   Musculoskeletal:         General: No swelling, tenderness, deformity or signs of injury. Normal range of motion.      Cervical back: Normal range of motion. No rigidity. No muscular tenderness.   Lymphadenopathy:      Cervical: No cervical adenopathy.   Skin:     General: Skin is warm and dry.      Capillary Refill: Capillary refill takes less than 2 seconds.      Coloration: Skin is not jaundiced or pale.      Findings: No bruising or erythema.   Neurological:      General: No focal deficit present.      Mental Status: He is alert and oriented to person, place, and time. Mental status is at baseline.      Cranial Nerves: No cranial nerve deficit.      Sensory: No sensory deficit.      Motor: No weakness.      Coordination: Coordination normal.      Deep Tendon Reflexes: Reflexes normal.   Psychiatric:         Mood and Affect: Mood normal.         Behavior: Behavior normal.         Thought Content: Thought content normal.         Judgment: Judgment normal.           Additional Data:     Labs:    Results from last 7 days   Lab Units 05/22/24  0357   WBC Thousand/uL 6.99   HEMOGLOBIN g/dL 12.5   HEMATOCRIT % 38.4   PLATELETS Thousands/uL 172   SEGS PCT % 74   LYMPHO PCT % 12*   MONO PCT % 13*   EOS PCT % 0     Results from last 7 days   Lab Units 05/22/24  0357   SODIUM mmol/L 133*   POTASSIUM mmol/L 3.7   CHLORIDE mmol/L 103   CO2 mmol/L 22   BUN mg/dL 12   CREATININE mg/dL 0.67   ANION GAP mmol/L 8   CALCIUM mg/dL 7.6*   ALBUMIN g/dL 2.6*   TOTAL BILIRUBIN mg/dL 0.65   ALK PHOS U/L 292*   ALT U/L 19   AST U/L 68*   GLUCOSE RANDOM  mg/dL 72     Results from last 7 days   Lab Units 05/22/24  0357   INR  1.46*     Results from last 7 days   Lab Units 05/18/24  0941   POC GLUCOSE mg/dl 108         Results from last 7 days   Lab Units 05/18/24  0954   LACTIC ACID mmol/L 1.9   PROCALCITONIN ng/ml 1.26*           * I Have Reviewed All Lab Data Listed Above.  * Additional Pertinent Lab Tests Reviewed: All Labs For Current Hospital Admission Reviewed        Recent Cultures (last 7 days):     Results from last 7 days   Lab Units 05/22/24  0416 05/18/24  0955 05/18/24  0954   BLOOD CULTURE  Received in Microbiology Lab. Culture in Progress.  Received in Microbiology Lab. Culture in Progress. No Growth at 72 hrs. Streptococcus agalactiae (Group B)*   GRAM STAIN RESULT   --   --  Gram positive cocci in chains*       Last 24 Hours Medication List:   Current Facility-Administered Medications   Medication Dose Route Frequency Provider Last Rate    acetaminophen  650 mg Oral Q6H PRN Marge Echo Saraiya, DO      amiodarone  200 mg Oral Daily With Breakfast Marge Echo Saraiya, DO      atorvastatin  80 mg Oral Daily With Dinner Marge Echo Saraiya, DO      cefTRIAXone  2,000 mg Intravenous Q24H Toby Paz MD 2,000 mg (05/22/24 0550)    heparin (porcine)  3-20 Units/kg/hr (Order-Specific) Intravenous Titrated Marge Echo Saraiya, DO Stopped (05/22/24 0554)    mirtazapine  30 mg Oral HS Marge Echo Saraiya, DO      pantoprazole  40 mg Oral Early Morning Marge Echo Saraiya, DO      temazepam  15 mg Oral HS PRN Marge Echo Saraiya, DO          Today, Patient Was Seen By: Toby Paz MD    ** Please Note: Dictation voice to text software may have been used in the creation of this document. **

## 2024-05-22 NOTE — ASSESSMENT & PLAN NOTE
Hx of a fib on eliquis and amiodarone daily  EKG with NSR and PAC  Given that blood pressure is stable    Continue PO amiodarone  Continue IV heparin instead of Eliquis for now given need for procedures

## 2024-05-23 LAB
ALBUMIN SERPL BCP-MCNC: 2.6 G/DL (ref 3.5–5)
ALP SERPL-CCNC: 279 U/L (ref 34–104)
ALT SERPL W P-5'-P-CCNC: 20 U/L (ref 7–52)
ANION GAP SERPL CALCULATED.3IONS-SCNC: 9 MMOL/L (ref 4–13)
APTT PPP: 50 SECONDS (ref 23–37)
AST SERPL W P-5'-P-CCNC: 91 U/L (ref 13–39)
BASOPHILS # BLD AUTO: 0.02 THOUSANDS/ÂΜL (ref 0–0.1)
BASOPHILS NFR BLD AUTO: 0 % (ref 0–1)
BILIRUB SERPL-MCNC: 0.58 MG/DL (ref 0.2–1)
BUN SERPL-MCNC: 13 MG/DL (ref 5–25)
CALCIUM ALBUM COR SERPL-MCNC: 8.7 MG/DL (ref 8.3–10.1)
CALCIUM SERPL-MCNC: 7.6 MG/DL (ref 8.4–10.2)
CHLORIDE SERPL-SCNC: 103 MMOL/L (ref 96–108)
CO2 SERPL-SCNC: 21 MMOL/L (ref 21–32)
CREAT SERPL-MCNC: 0.78 MG/DL (ref 0.6–1.3)
EOSINOPHIL # BLD AUTO: 0.01 THOUSAND/ÂΜL (ref 0–0.61)
EOSINOPHIL NFR BLD AUTO: 0 % (ref 0–6)
ERYTHROCYTE [DISTWIDTH] IN BLOOD BY AUTOMATED COUNT: 18.1 % (ref 11.6–15.1)
GFR SERPL CREATININE-BSD FRML MDRD: 92 ML/MIN/1.73SQ M
GLUCOSE SERPL-MCNC: 84 MG/DL (ref 65–140)
HCT VFR BLD AUTO: 38.2 % (ref 36.5–49.3)
HGB BLD-MCNC: 12.2 G/DL (ref 12–17)
IMM GRANULOCYTES # BLD AUTO: 0.04 THOUSAND/UL (ref 0–0.2)
IMM GRANULOCYTES NFR BLD AUTO: 1 % (ref 0–2)
LYMPHOCYTES # BLD AUTO: 0.7 THOUSANDS/ÂΜL (ref 0.6–4.47)
LYMPHOCYTES NFR BLD AUTO: 10 % (ref 14–44)
MCH RBC QN AUTO: 26.5 PG (ref 26.8–34.3)
MCHC RBC AUTO-ENTMCNC: 31.9 G/DL (ref 31.4–37.4)
MCV RBC AUTO: 83 FL (ref 82–98)
MONOCYTES # BLD AUTO: 0.84 THOUSAND/ÂΜL (ref 0.17–1.22)
MONOCYTES NFR BLD AUTO: 12 % (ref 4–12)
NEUTROPHILS # BLD AUTO: 5.32 THOUSANDS/ÂΜL (ref 1.85–7.62)
NEUTS SEG NFR BLD AUTO: 77 % (ref 43–75)
NRBC BLD AUTO-RTO: 0 /100 WBCS
PLATELET # BLD AUTO: 155 THOUSANDS/UL (ref 149–390)
PMV BLD AUTO: 10.6 FL (ref 8.9–12.7)
POTASSIUM SERPL-SCNC: 3.7 MMOL/L (ref 3.5–5.3)
PROT SERPL-MCNC: 5.4 G/DL (ref 6.4–8.4)
RBC # BLD AUTO: 4.61 MILLION/UL (ref 3.88–5.62)
SODIUM SERPL-SCNC: 133 MMOL/L (ref 135–147)
WBC # BLD AUTO: 6.93 THOUSAND/UL (ref 4.31–10.16)

## 2024-05-23 PROCEDURE — 85025 COMPLETE CBC W/AUTO DIFF WBC: CPT | Performed by: INTERNAL MEDICINE

## 2024-05-23 PROCEDURE — 99223 1ST HOSP IP/OBS HIGH 75: CPT | Performed by: INTERNAL MEDICINE

## 2024-05-23 PROCEDURE — 80053 COMPREHEN METABOLIC PANEL: CPT | Performed by: INTERNAL MEDICINE

## 2024-05-23 PROCEDURE — 99233 SBSQ HOSP IP/OBS HIGH 50: CPT | Performed by: INTERNAL MEDICINE

## 2024-05-23 PROCEDURE — 85730 THROMBOPLASTIN TIME PARTIAL: CPT | Performed by: INTERNAL MEDICINE

## 2024-05-23 PROCEDURE — 99232 SBSQ HOSP IP/OBS MODERATE 35: CPT | Performed by: INTERNAL MEDICINE

## 2024-05-23 RX ADMIN — PANTOPRAZOLE SODIUM 40 MG: 40 TABLET, DELAYED RELEASE ORAL at 05:16

## 2024-05-23 RX ADMIN — MIRTAZAPINE 30 MG: 15 TABLET, FILM COATED ORAL at 21:11

## 2024-05-23 RX ADMIN — APIXABAN 5 MG: 5 TABLET, FILM COATED ORAL at 17:28

## 2024-05-23 RX ADMIN — AMIODARONE HYDROCHLORIDE 200 MG: 200 TABLET ORAL at 07:53

## 2024-05-23 RX ADMIN — ATORVASTATIN CALCIUM 80 MG: 40 TABLET, FILM COATED ORAL at 17:27

## 2024-05-23 RX ADMIN — CEFTRIAXONE SODIUM 2000 MG: 10 INJECTION, POWDER, FOR SOLUTION INTRAVENOUS at 05:19

## 2024-05-23 NOTE — PLAN OF CARE
Problem: SAFETY ADULT  Goal: Patient will remain free of falls  Description: INTERVENTIONS:  - Educate patient/family on patient safety including physical limitations  - Instruct patient to call for assistance with activity   - Consult OT/PT to assist with strengthening/mobility   - Keep Call bell within reach  - Keep bed low and locked with side rails adjusted as appropriate  - Keep care items and personal belongings within reach  - Initiate and maintain comfort rounds  - Make Fall Risk Sign visible to staff  - Offer Toileting every 2 Hours, in advance of need  - Initiate/Maintain bed alarm  - Obtain necessary fall risk management equipment: NA  - Apply yellow socks and bracelet for high fall risk patients  - Consider moving patient to room near nurses station  Outcome: Progressing

## 2024-05-23 NOTE — CONSULTS
Oncology Consult Note  Tom Pisano 69 y.o. male MRN: [id number]  Unit/Bed#: -01 Encounter: [id number]      Presenting Complaint: Came with tachycardia and leukocytosis.  Found to have liver lesions and a lesion in the lower esophagus.    History of Presenting Illness: Tom Pisano is seen for initial consultation 5/18/2024 at the referral of hospitalist service.  69-year-old gentleman was seen with onset of diarrhea weakness fatigue slight confusion.  He has underlying history of dementia.  He is pleasant he can speak but is easily confused.  He has a history of peripheral vascular disease permanent pacemaker atrial fibrillation.  He is on blood thinners as well.  He lives alone but has home health people coming in to assist him with care.  Is a past history state of dementia.  He has coronary artery disease.  He has permanent pacemaker.  He has atrial fibrillation.  He was hospitalized November 2023 with double pneumonia with RSV.  He has lost 10 pounds in the last month.  He is dehydrated was not eating or drinking very well.  He is actually eating and swallowing can swallow solids presently.  He is undergoing workup which shows multiple filling defects in the liver consistent with metastatic disease.  He also has an esophagoscopy a lesion in the bottom one third of the esophagus biopsies of which are pending.  His EGD showed a malignant appearing fungating mass transverse a bowl in the cardia and fundus of the stomach covering one third of the circumference bleeding occurred before intervention cold forceps biopsy was done.  He also had a malignant appearing fungating ulcerated mass in the lower third esophagus 35 cm from the incisors which was also bleeding.  Both were biopsied and biopsies are pending.    Review of Systems - As stated in the HPI otherwise the fourteen point review of systems was negative.    Past Medical History:   Diagnosis Date    CHF (congestive heart failure) (HCC)      Dementia (HCC)     DVT (deep venous thrombosis) (HCC)     lt leg    Hypertension     Hypotension 2024       Social History     Socioeconomic History    Marital status:      Spouse name: None    Number of children: None    Years of education: None    Highest education level: None   Occupational History    None   Tobacco Use    Smoking status: Former     Current packs/day: 0.00     Types: Cigarettes     Quit date: 1970     Years since quittin.4    Smokeless tobacco: Never   Vaping Use    Vaping status: Never Used   Substance and Sexual Activity    Alcohol use: Never    Drug use: Never    Sexual activity: Never   Other Topics Concern    None   Social History Narrative    None     Social Determinants of Health     Financial Resource Strain: Not on file   Food Insecurity: No Food Insecurity (2024)    Hunger Vital Sign     Worried About Running Out of Food in the Last Year: Never true     Ran Out of Food in the Last Year: Never true   Transportation Needs: No Transportation Needs (2024)    PRAPARE - Transportation     Lack of Transportation (Medical): No     Lack of Transportation (Non-Medical): No   Physical Activity: Not on file   Stress: Not on file   Social Connections: Not on file   Intimate Partner Violence: Not on file   Housing Stability: Low Risk  (2024)    Housing Stability Vital Sign     Unable to Pay for Housing in the Last Year: No     Number of Times Moved in the Last Year: 0     Homeless in the Last Year: No       Family History   Problem Relation Age of Onset    Heart disease Mother     Cancer Father     Heart disease Father     Heart disease Brother        Allergies   Allergen Reactions    Shellfish-Derived Products - Food Allergy Hives         Current Facility-Administered Medications:     acetaminophen (TYLENOL) tablet 650 mg, 650 mg, Oral, Q6H PRN, Marge Echo Kranthiaiya, DO    amiodarone tablet 200 mg, 200 mg, Oral, Daily With Breakfast, Marge Echo Saraiya, DO, 200 mg  at 05/23/24 0753    apixaban (ELIQUIS) tablet 5 mg, 5 mg, Oral, BID, Toby Olivera MD    atorvastatin (LIPITOR) tablet 80 mg, 80 mg, Oral, Daily With Dinner, Marge Echo Saraiya, DO, 80 mg at 05/22/24 1855    cefTRIAXone (ROCEPHIN) 2,000 mg in dextrose 5 % 50 mL IVPB, 2,000 mg, Intravenous, Q24H, Toby Olivera MD, Last Rate: 100 mL/hr at 05/23/24 0519, 2,000 mg at 05/23/24 0519    mirtazapine (REMERON) tablet 30 mg, 30 mg, Oral, HS, Marge Echo Saraiya, DO, 30 mg at 05/22/24 2045    pantoprazole (PROTONIX) EC tablet 40 mg, 40 mg, Oral, Early Morning, Marge Echo Saraiya, DO, 40 mg at 05/23/24 0516    temazepam (RESTORIL) capsule 15 mg, 15 mg, Oral, HS PRN, Marge Echo Saraiya, DO, 15 mg at 05/22/24 2045      /82   Pulse 73   Temp 97.5 °F (36.4 °C) (Temporal)   Resp 18   Ht 6' (1.829 m)   Wt 84.5 kg (186 lb 4.6 oz)   SpO2 96%   BMI 25.27 kg/m²       General Appearance:    Alert, oriented.  He can answer questions he was easily confused.       Eyes:    PERRL   Ears:    Normal external ear canals, both ears   Nose:   Nares normal, septum midline   Throat:   Mucosa moist. Pharynx without injection.    Neck:   Supple       Lungs:     Clear to auscultation bilaterally   Chest Wall:    No tenderness or deformity    Heart:    Regular rate and rhythm       Abdomen:     Soft, non-tender, bowel sounds +, no organomegaly    No tenderness no major masses       Extremities:   Extremities no cyanosis or edema       Skin:   no rash or icterus.    Lymph nodes:   Cervical, supraclavicular, and axillary nodes normal   Neurologic:   CNII-XII intact, normal strength, sensation and reflexes     Throughout               Recent Results (from the past 48 hour(s))   APTT    Collection Time: 05/22/24  3:57 AM   Result Value Ref Range    PTT 66 (H) 23 - 37 seconds   CBC and differential    Collection Time: 05/22/24  3:57 AM   Result Value Ref Range    WBC 6.99 4.31 - 10.16 Thousand/uL    RBC 4.71 3.88 - 5.62  Million/uL    Hemoglobin 12.5 12.0 - 17.0 g/dL    Hematocrit 38.4 36.5 - 49.3 %    MCV 82 82 - 98 fL    MCH 26.5 (L) 26.8 - 34.3 pg    MCHC 32.6 31.4 - 37.4 g/dL    RDW 18.1 (H) 11.6 - 15.1 %    MPV 10.8 8.9 - 12.7 fL    Platelets 172 149 - 390 Thousands/uL    nRBC 0 /100 WBCs    Segmented % 74 43 - 75 %    Immature Grans % 1 0 - 2 %    Lymphocytes % 12 (L) 14 - 44 %    Monocytes % 13 (H) 4 - 12 %    Eosinophils Relative 0 0 - 6 %    Basophils Relative 0 0 - 1 %    Absolute Neutrophils 5.21 1.85 - 7.62 Thousands/µL    Absolute Immature Grans 0.05 0.00 - 0.20 Thousand/uL    Absolute Lymphocytes 0.81 0.60 - 4.47 Thousands/µL    Absolute Monocytes 0.89 0.17 - 1.22 Thousand/µL    Eosinophils Absolute 0.01 0.00 - 0.61 Thousand/µL    Basophils Absolute 0.02 0.00 - 0.10 Thousands/µL   Comprehensive metabolic panel    Collection Time: 05/22/24  3:57 AM   Result Value Ref Range    Sodium 133 (L) 135 - 147 mmol/L    Potassium 3.7 3.5 - 5.3 mmol/L    Chloride 103 96 - 108 mmol/L    CO2 22 21 - 32 mmol/L    ANION GAP 8 4 - 13 mmol/L    BUN 12 5 - 25 mg/dL    Creatinine 0.67 0.60 - 1.30 mg/dL    Glucose 72 65 - 140 mg/dL    Calcium 7.6 (L) 8.4 - 10.2 mg/dL    Corrected Calcium 8.7 8.3 - 10.1 mg/dL    AST 68 (H) 13 - 39 U/L    ALT 19 7 - 52 U/L    Alkaline Phosphatase 292 (H) 34 - 104 U/L    Total Protein 5.5 (L) 6.4 - 8.4 g/dL    Albumin 2.6 (L) 3.5 - 5.0 g/dL    Total Bilirubin 0.65 0.20 - 1.00 mg/dL    eGFR 98 ml/min/1.73sq m   Protime-INR    Collection Time: 05/22/24  3:57 AM   Result Value Ref Range    Protime 18.5 (H) 11.6 - 14.5 seconds    INR 1.46 (H) 0.84 - 1.19   Blood culture    Collection Time: 05/22/24  4:16 AM    Specimen: Arm, Left; Blood   Result Value Ref Range    Blood Culture No Growth at 24 hrs.    Blood culture    Collection Time: 05/22/24  4:16 AM    Specimen: Arm, Left; Blood   Result Value Ref Range    Blood Culture No Growth at 24 hrs.    CBC and differential    Collection Time: 05/23/24  6:29 AM   Result  Value Ref Range    WBC 6.93 4.31 - 10.16 Thousand/uL    RBC 4.61 3.88 - 5.62 Million/uL    Hemoglobin 12.2 12.0 - 17.0 g/dL    Hematocrit 38.2 36.5 - 49.3 %    MCV 83 82 - 98 fL    MCH 26.5 (L) 26.8 - 34.3 pg    MCHC 31.9 31.4 - 37.4 g/dL    RDW 18.1 (H) 11.6 - 15.1 %    MPV 10.6 8.9 - 12.7 fL    Platelets 155 149 - 390 Thousands/uL    nRBC 0 /100 WBCs    Segmented % 77 (H) 43 - 75 %    Immature Grans % 1 0 - 2 %    Lymphocytes % 10 (L) 14 - 44 %    Monocytes % 12 4 - 12 %    Eosinophils Relative 0 0 - 6 %    Basophils Relative 0 0 - 1 %    Absolute Neutrophils 5.32 1.85 - 7.62 Thousands/µL    Absolute Immature Grans 0.04 0.00 - 0.20 Thousand/uL    Absolute Lymphocytes 0.70 0.60 - 4.47 Thousands/µL    Absolute Monocytes 0.84 0.17 - 1.22 Thousand/µL    Eosinophils Absolute 0.01 0.00 - 0.61 Thousand/µL    Basophils Absolute 0.02 0.00 - 0.10 Thousands/µL   Comprehensive metabolic panel    Collection Time: 05/23/24  6:29 AM   Result Value Ref Range    Sodium 133 (L) 135 - 147 mmol/L    Potassium 3.7 3.5 - 5.3 mmol/L    Chloride 103 96 - 108 mmol/L    CO2 21 21 - 32 mmol/L    ANION GAP 9 4 - 13 mmol/L    BUN 13 5 - 25 mg/dL    Creatinine 0.78 0.60 - 1.30 mg/dL    Glucose 84 65 - 140 mg/dL    Calcium 7.6 (L) 8.4 - 10.2 mg/dL    Corrected Calcium 8.7 8.3 - 10.1 mg/dL    AST 91 (H) 13 - 39 U/L    ALT 20 7 - 52 U/L    Alkaline Phosphatase 279 (H) 34 - 104 U/L    Total Protein 5.4 (L) 6.4 - 8.4 g/dL    Albumin 2.6 (L) 3.5 - 5.0 g/dL    Total Bilirubin 0.58 0.20 - 1.00 mg/dL    eGFR 92 ml/min/1.73sq m   APTT    Collection Time: 05/23/24  6:29 AM   Result Value Ref Range    PTT 50 (H) 23 - 37 seconds         Colonoscopy    Result Date: 5/22/2024  Narrative: Table formatting from the original result was not included.  Formerly [facility] Endoscopy [address] [id number] [phone] DATE OF SERVICE: 5/22/24 PHYSICIAN(S): Attending: Bradford Tam DO Fellow: No Staff Documented INDICATION: Abnormal  abdominal CT scan, Liver lesion POST-OP DIAGNOSIS: See the impression below. HISTORY: Prior colonoscopy: 3 years ago. BOWEL PREPARATION: Miralax/Dulcolax; Golytely/Colyte/Trilyte PREPROCEDURE: Informed consent was obtained for the procedure, including sedation. Risks including but not limited to bleeding, infection, perforation, adverse drug reaction and aspiration were explained in detail. Also explained about less than 100% sensitivity with the exam and other alternatives. The patient was placed in the left lateral decubitus position. Procedure: Colonoscopy DETAILS OF PROCEDURE: Patient was taken to the procedure room where a time out was performed to confirm correct patient and correct procedure. The patient underwent monitored anesthesia care, which was administered by an anesthesia professional. The patient's blood pressure, heart rate, level of consciousness, oxygen, respirations, ECG and ETCO2 were monitored throughout the procedure. A digital rectal exam was performed. A perianal exam was performed. The scope was introduced through the anus and advanced to the cecum. Photodocumentation was obtained at the ileocecal valve, appendiceal orifice and retroflexed view of the rectum. Retroflexion was performed in the rectum. The quality of bowel preparation was evaluated using the [place] Bowel Preparation Scale with scores of: right colon = 2, transverse colon = 2, left colon = 2. The total BBPS score was 6. Bowel prep was adequate. The patient's estimated blood loss was minimal (<5 mL). The procedure was not difficult. The patient tolerated the procedure well. There were no apparent adverse events. ANESTHESIA INFORMATION: ASA: III Anesthesia Type: Anesthesia type not filed in the log. MEDICATIONS: No administrations occurring from 1512 to 1602 on 05/22/24 FINDINGS: Diverticula in the mid sigmoid colon and distal sigmoid colon Four sessile polyps measuring smaller than 5 mm in the proximal ascending colon; bleeding  occurred after intervention; performed cold snare with complete en bloc removal and retrieved specimen Two sessile polyps measuring smaller than 5 mm in the proximal transverse colon; bleeding occurred after intervention; performed cold snare with complete en bloc removal and retrieved specimen Three sessile polyps measuring smaller than 5 mm in the mid transverse colon; bleeding occurred after intervention; performed cold snare with complete en bloc removal and retrieved specimen Three sessile polyps measuring smaller than 5 mm in the sigmoid colon; bleeding occurred after intervention; performed cold snare with complete en bloc removal and retrieved specimen The cecum, hepatic flexure, splenic flexure, descending colon, rectosigmoid and rectum appeared normal. EVENTS: Procedure Events Event Event Time ENDO CECUM REACHED 5/22/2024  3:40 PM ENDO SCOPE OUT TIME 5/22/2024  4:01 PM SPECIMENS: ID Type Source Tests Collected by Time Destination 1 : stomach mass Tissue Stomach TISSUE EXAM Bradford Tam, DO 5/22/2024  3:26 PM  2 : ESOPHAGEAL mass Tissue Esophagus TISSUE EXAM Bradford Tam, DO 5/22/2024  3:27 PM  3 : proximal ascending x4 Tissue Polyp, Colorectal TISSUE EXAM Bradford Tam, DO 5/22/2024  3:50 PM  4 : proximal transversex 2 Tissue Polyp, Colorectal TISSUE EXAM Bradford Tam, DO 5/22/2024  3:50 PM  5 : mid transverse x3 Tissue Polyp, Colorectal TISSUE EXAM Bradford Tam, DO 5/22/2024  3:51 PM  6 : mid signmoid x3 Tissue Polyp, Colorectal TISSUE EXAM Bradford Tam, DO 5/22/2024  3:58 PM  EQUIPMENT: Colonoscope -[id number] ENDOCUFF VISION LRG GREEN ID [id number]     Impression: Diverticulosis in the mid sigmoid colon and distal sigmoid colon Four polyps measuring smaller than 5 mm in the proximal ascending colon; bleeding occurred after intervention; performed cold snare removal Two polyps measuring smaller than 5 mm in the proximal transverse colon; bleeding occurred after intervention; performed cold  snare removal Three polyps measuring smaller than 5 mm in the mid transverse colon; bleeding occurred after intervention; performed cold snare removal Three polyps measuring smaller than 5 mm in the sigmoid colon; bleeding occurred after intervention; performed cold snare removal The cecum, hepatic flexure, splenic flexure, descending colon, rectosigmoid and rectum appeared normal. RECOMMENDATION: Repeat colonoscopy in 3 years, due: 5/22/2027 Personal history of colon polyps   High-fiber diet to include, vegetables, whole-grain foods, daily Will call the patient in 1 to 2 weeks with results of the polyps  Bradford Tam DO FACG, FACP    EGD    Result Date: 5/22/2024  Narrative: Table formatting from the original result was not included.  [facility] Endoscopy [address] [facility] [id number] [phone] DATE OF SERVICE: 5/22/24 PHYSICIAN(S): Attending: Bradford Tam DO Fellow: No Staff Documented INDICATION: Abnormal abdominal CT scan, Liver lesion POST-OP DIAGNOSIS: See the impression below. PREPROCEDURE: Informed consent was obtained for the procedure, including sedation.  Risks of perforation, hemorrhage, adverse drug reaction and aspiration were discussed. The patient was placed in the left lateral decubitus position. Patient was explained about the risks and benefits of the procedure. Risks including but not limited to bleeding, infection, and perforation were explained in detail. Also explained about less than 100% sensitivity with the exam and other alternatives. PROCEDURE: EGD DETAILS OF PROCEDURE: Patient was taken to the procedure room where a time out was performed to confirm correct patient and correct procedure. The patient underwent monitored anesthesia care, which was administered by an anesthesia professional. The patient's blood pressure, heart rate, level of consciousness, respirations, oxygen, ECG and ETCO2 were monitored throughout the procedure. The scope was  introduced through the mouth and advanced to the second part of the duodenum. Retroflexion was performed in the fundus. The patient's estimated blood loss was minimal (<5 mL). The procedure was not difficult. The patient tolerated the procedure well. There were no apparent adverse events. ANESTHESIA INFORMATION: ASA: ASA status not filed in the log. Anesthesia Type: Anesthesia type not filed in the log. MEDICATIONS: No administrations occurring from 1512 to 1528 on 05/22/24 FINDINGS: The cricopharynx, upper third of the esophagus and middle third of the esophagus appeared normal. Z-line is 38 cm from the incisors. Malignant-appearing, fungating and ulcerated mass (traversable) in the lower third of the esophagus (35 cm from the incisors), covering the whole circumference; bleeding occurred after intervention; performed cold forceps biopsy Malignant-appearing and fungating mass (traversable) in the cardia and fundus of the stomach, covering one third of the circumference; bleeding occurred before intervention; performed cold forceps biopsy The body of the stomach, incisura, antrum, prepyloric region and pylorus appeared normal. The duodenal bulb and 2nd part of the duodenum appeared normal. SPECIMENS: ID Type Source Tests Collected by Time Destination 1 : stomach mass Tissue Stomach TISSUE EXAM Bradford Padillaan,  5/22/2024  3:26 PM  2 : ESOPHAGEAL mass Tissue Esophagus TISSUE EXAM Bradford Padillaan, DO 5/22/2024  3:27 PM      Impression: The cricopharynx, upper third of the esophagus and middle third of the esophagus appeared normal. Malignant-appearing, fungating and ulcerated mass in the lower third of the esophagus, covering the whole circumference; bleeding occurred after intervention; performed cold forceps biopsy Malignant-appearing and fungating mass in the cardia and fundus of the stomach, covering one third of the circumference; bleeding occurred before intervention; performed cold forceps biopsy The body of  the stomach, incisura, antrum, prepyloric region and pylorus appeared normal. The duodenal bulb and 2nd part of the duodenum appeared normal. RECOMMENDATION: 1.  I have sent the Specimens to the pathology section with a stat request 2.  No indication for percutaneous biopsy of the liver   Bradford MATHEW Anel, DO FACG, FACP     XR chest 1 view portable    Result Date: 5/18/2024  Narrative: XR CHEST PORTABLE INDICATION: hypotension. COMPARISON: Chest CT 5/18/2024, CXR 11/30/2023. FINDINGS: Emphysema with no acute disease. Surgical changes in the left lung. No pneumothorax or pleural effusion. Heart upper limit of normal in size with left subclavian ICD leads in right atrium and right ventricle. Bones are unremarkable for age. Old right clavicle fracture. Normal upper abdomen. Abdominal aortic stent graft.     Impression: No acute cardiopulmonary disease. Workstation performed: [id number]     CT chest abdomen pelvis w contrast    Result Date: 5/18/2024  Narrative: CT CHEST, ABDOMEN AND PELVIS WITH IV CONTRAST INDICATION: hypotension, hypothermic, concern for infx, diarrhea with blood today. 69-year-old male. COMPARISON: CT chest abdomen pelvis from November 30, 2023. TECHNIQUE: CT examination of the chest, abdomen and pelvis was performed. Multiplanar 2D reformatted images were created from the source data. This examination, like all CT scans performed in the [facility], was performed utilizing techniques to minimize radiation dose exposure, including the use of iterative reconstruction and automated exposure control. Radiation dose length product (DLP) for this visit: 724 mGy-cm IV Contrast: 100 mL of iohexol (OMNIPAQUE) Enteric Contrast: Not administered. FINDINGS: CHEST LUNGS: Advanced emphysema, most severe in the upper lobes with large left apical bulla, unchanged since 11/30/2023. Postoperative changes in the left upper lobe. Resolution of bilateral lower lobe consolidation since the CT from  11/30/2023. Subsegmental atelectasis in the bases of both lower lobes. No mass, consolidation or suspicious nodule. PLEURA: Unremarkable. HEART/GREAT VESSELS: Coronary artery calcifications. Mild cardiomegaly. Heart otherwise unremarkable. ICD present.. Fusiform ectasia of the ascending thoracic aorta measuring up to 4.1 cm. Recommendation is for follow-up low radiation dose chest CT in one year. Mild dilatation of the central pulmonary arteries with main pulmonary artery diameter of 3.2 cm. MEDIASTINUM AND MAVIS: Several prominent mediastinal lymph nodes with normal morphology, the largest a subcarinal node with a short axis diameter of 1.4 cm these lymph nodes have decreased in size since 11/30/2030. No new lymphadenopathy or mass. Moderate  size hiatal hernia. Esophagus unremarkable. Trachea and main stem bronchi unremarkable. CHEST WALL AND LOWER NECK: Unremarkable. ABDOMEN LIVER/BILIARY TREE: Liver enlarged, 18.5 cm in length, increased in size since 11/30/2023. Interval development of numerous hypoenhancing masses throughout the liver, the largest including: 3.0 x 5.5 cm, segment 2; 3.0 x 4.3 cm; segments 2/4A; 2.7 x 4.1 cm mass, segment 5;; 2.5 x 3.2 cm, segment 6; 2.5 x 3.4 cm, segment 8. Bile ducts normal in caliber. GALLBLADDER: No calcified gallstones. No pericholecystic inflammatory change. SPLEEN: Unremarkable. PANCREAS: Several enlarged low-attenuation peripancreatic lymph nodes but no convincing evidence of intrapancreatic tumor questionable 0.7 x 1.1 cm hypoenhancing structure at the junction of the pancreatic head and uncinate process (series 2, image 145). ADRENAL GLANDS: Unremarkable. KIDNEYS/URETERS: 1.4 x 1.6 cm fluid attenuation structure in the lateral mid left kidney, most likely a simple cortical cyst. No suspicious renal masses. Cortical scarring in the lower pole of the left kidney. No calculus or hydronephrosis. STOMACH AND BOWEL: Gastric fundus located within a hiatal hernia. Stomach  otherwise unremarkable. Small intestine unremarkable. Diffuse colonic diverticulosis without evidence of diverticulitis. Colon otherwise unremarkable. APPENDIX: Normal. ABDOMINOPELVIC CAVITY: Multiple enlarged lymph nodes developing since 11/30/2023. These include a 1.5 x 3.7 cm portacaval node, several vinicius hepatis nodes, the largest measuring 1.3 x 1.8 cm and 1.1 x 1.6 cm, a 1.9 x 2.2 cm node superior to the proximal  pancreatic body, 1.3 x 2.0 cm left para-aortic node at the level of the origin of the SMA, an additional 1.0 x 1.5 cm node adjacent to the origin of the SMA. Trace amount of pelvic ascites. No extraluminal gas. VESSELS: Extensive aortic and iliac atherosclerosis. Aortoiliac stent grafts present. Stented left common iliac artery appears to be occluded. Femorofemoral bypass graft present, but also unenhanced and likely occluded. Splenic, portal and superior mesenteric veins patent. Splenic artery and SMA patent. PELVIS REPRODUCTIVE ORGANS: Enlarged prostate. URINARY BLADDER: Unremarkable. ABDOMINAL WALL/INGUINAL REGIONS: Unremarkable. BONES: Deformity of the right clavicle, consistent with old healed, healed fracture. No acute fracture or destructive lesion.     Impression: 1.  Multiple hepatic metastases, developing since 11/30/2023. 2.  Retroperitoneal, peripancreatic and vinicius hepatis lymphadenopathy, also new since 11/30/2023. 3.  Site of primary malignancy unknown although there is subtle suggestion of a 0.7 x 1.1 cm mass at the junction of the pancreatic head and uncinate process. This can be better evaluated with MRI of the abdomen without and with IV contrast, with MRCP. 4.  Aortobifemoral stent graft present with probable complete occlusion of the left iliac limb and probable occlusion of the femoral-femoral bypass graft. 5.  Severe emphysema. 6.  Other than subsegmental atelectasis in the bases of the both lower lobes, no evidence of acute abnormality in the chest. I personally discussed  this study with MAUREEN AMARO on 5/18/2024 12:27 PM. Workstation performed: [id number]     CT head without contrast    Result Date: 5/18/2024  Narrative: CT BRAIN - WITHOUT CONTRAST INDICATION:   worsening mental status (h/o dementia). 69-year-old male. COMPARISON: CT from November 30, 2023. TECHNIQUE:  CT examination of the brain was performed.  Multiplanar 2D reformatted images were created from the source data. Radiation dose length product (DLP) for this visit:  882 mGy-cm .  This examination, like all CT scans performed in the [facility], was performed utilizing techniques to minimize radiation dose exposure, including the use of iterative reconstruction and automated exposure control. IMAGE QUALITY:  Diagnostic. FINDINGS: PARENCHYMA:  No intracranial mass, mass effect or midline shift. No CT signs of acute infarction.  No acute parenchymal hemorrhage. Decreased white matter attenuation, most consistent with chronic microangiopathic changes. VENTRICLES AND EXTRA-AXIAL SPACES: Ventricles and extra-axial CSF spaces are prominent commensurate with the degree of volume loss.  No hydrocephalus.  No acute extra-axial hemorrhage. VISUALIZED ORBITS: Normal visualized orbits. PARANASAL SINUSES: Normal visualized paranasal sinuses. CALVARIUM AND EXTRACRANIAL SOFT TISSUES:  Normal.     Impression: No acute intracranial abnormality. Workstation performed: [id number]     ECOG PS: 1-2      Assessment and plan:  1 stage IV disease presumed gastroesophageal origin biopsies pending  2 diffuse liver metastasis  3 history dimension  4 permanent pacemaker with coronary artery disease  5 atrial fibrillation  Plan: We will await results of the biopsy.  He may be able to go home later this week on or on for Saturday.  We will probably offer him an appointment to follow-up here in the office at the end of next week and then hopefully will have pathology back.  Unfortunately he has systemic disease and we  will be offering him.  Systemic chemotherapy probably FOLFOX based.  We would wait for results of NGS testing particularly H ER 2 as that could be added along with the possibility of immunotherapy.  His sister was present during the discussion.

## 2024-05-23 NOTE — ASSESSMENT & PLAN NOTE
Hx of a fib on eliquis and amiodarone daily  EKG with NSR and PAC  Given that blood pressure is stable    Continue PO amiodarone  With transition back from heparin infusion to p.o. Eliquis and monitor response.

## 2024-05-23 NOTE — PROGRESS NOTES
[facility]  Progress Note  Name: Tom Pisano I  MRN: [id number]  Unit/Bed#: -01 I Date of Admission: 5/18/2024   Date of Service: 5/23/2024 I Hospital Day: 5    Assessment & Plan   * Liver lesion  Assessment & Plan  Multiple hepatic metastases, developing since 11/30/2023. Retroperitoneal, peripancreatic and vinicius hepatis lymphadenopathy, also new since 11/30/2023.Site of primary malignancy unknown although there is subtle suggestion of a 0.7 x 1.1 cm mass at the junction of the pancreatic head and uncinate process. This can be better evaluated with MRI of the abdomen without and with IV contrast, with MRCP  MRI ordered but unclear if patients endovascular prosthesis is compatible with it - apparently this was done by Myron Cho MD in [facility]     GI consulted, appreciate recommendations -status post EGD and colonoscopy, EGD does reveal an esophageal mass which could be primary.  Biopsies taken, pending.  Oncology consulted      Severe sepsis (HCC)  Assessment & Plan  As evidenced by tachycardia and leukocytosis, Chest CT negative for acute infectious etiology. Family reports diarrhea, patient has had no BM since admission.   Patient received IV ceftriaxone  S/p 30 cc/kg Iv fluids, continue with IV fluids  1/2 blood cultures + gram positive cocci in chains    Given positive Bcx and elevated procal will keep on high dose ceftriaxone for now at least until repeat Bcx done - collected 5/22 and I expect to follow these results until finalized -so far negative    Positive blood culture  Assessment & Plan  1/2 blood culture + gram positive cocci, unclear if this is true but given concern for severe sepsis on admission and elevated procalcitonin will continue antibiotic    Repeat cultures so far negative.  Monitor      Atrial fibrillation (HCC)  Assessment & Plan  Hx of a fib on eliquis and amiodarone daily  EKG with NSR and PAC  Given that blood pressure is stable    Continue  PO amiodarone  With transition back from heparin infusion to p.o. Eliquis and monitor response.    Chronic systolic heart failure (HCC)  Assessment & Plan  Wt Readings from Last 3 Encounters:   24 84.5 kg (186 lb 4.6 oz)   23 83.5 kg (184 lb)   23 87.1 kg (192 lb)     Holding home dose Lasix and antihypertensive regimen in setting of hypotension  Hold off on additional IV fluids for now, appears euvolemic  Monitor intake and output closely            Hypertension  Assessment & Plan  Holding home dose Lasix 40 mg daily and Coreg 25 mg twice daily entreso in setting of hypotension as above    Status post femorofemoral bypass surgery  Assessment & Plan  Follows with vascular surgery outpatient, Ct showing Aortobifemoral stent graft present with probable complete occlusion of the left iliac limb and probable occlusion of the femoral-femoral bypass graft. Per chart review and family, this is known. Peripheral pulses present.   Patient denies claudication  Overall stable  OP vascular follow up           VTE Pharmacologic Prophylaxis:   Pharmacologic: Apixaban (Eliquis)  Mechanical VTE Prophylaxis in Place: Yes    Patient Centered Rounds: I have performed bedside rounds with nursing staff today.    Discussions with Specialists or Other Care Team Provider:   Discussed with care management team    Education and Discussions with Family / Patient: Discussed with family at the bedside    Time Spent for Care: 1 hour.  More than 50% of total time spent on counseling and coordination of care as described above.    Current Length of Stay: 5 day(s)    Current Patient Status: Inpatient   Certification Statement: The patient will continue to require additional inpatient hospital stay due to need for monitoring    Discharge Plan: 24-48h    Code Status: Level 1 - Full Code      Subjective:     Patient evaluated this morning.  Denies any chest pain nausea vomiting.      Objective:     Vitals:   Temp (24hrs), Av.7 °F  (36.5 °C), Min:97.5 °F (36.4 °C), Max:98.1 °F (36.7 °C)    Temp:  [97.5 °F (36.4 °C)-98.1 °F (36.7 °C)] 97.5 °F (36.4 °C)  HR:  [69-92] 73  Resp:  [16-18] 18  BP: ()/(61-87) 130/82  SpO2:  [93 %-99 %] 96 %  Body mass index is 25.27 kg/m².     Input and Output Summary (last 24 hours):       Intake/Output Summary (Last 24 hours) at 5/23/2024 1342  Last data filed at 5/23/2024 0900  Gross per 24 hour   Intake 980 ml   Output --   Net 980 ml       Physical Exam:     Physical Exam  Vitals and nursing note reviewed.   Constitutional:       Appearance: Normal appearance.      Comments: Male patient in bed, awake   HENT:      Head: Normocephalic and atraumatic.      Right Ear: External ear normal.      Left Ear: External ear normal.      Nose: Nose normal. No congestion or rhinorrhea.      Mouth/Throat:      Mouth: Mucous membranes are moist.      Pharynx: Oropharynx is clear. No oropharyngeal exudate or posterior oropharyngeal erythema.   Eyes:      General: No scleral icterus.        Right eye: No discharge.         Left eye: No discharge.      Pupils: Pupils are equal, round, and reactive to light.   Neck:      Vascular: No carotid bruit.   Cardiovascular:      Rate and Rhythm: Normal rate and regular rhythm.      Pulses: Normal pulses.      Heart sounds: No murmur heard.     No friction rub. No gallop.   Pulmonary:      Effort: Pulmonary effort is normal. No respiratory distress.      Breath sounds: Normal breath sounds. No stridor. No wheezing, rhonchi or rales.   Abdominal:      General: Abdomen is flat. Bowel sounds are normal. There is no distension.      Palpations: Abdomen is soft. There is no mass.      Tenderness: There is no abdominal tenderness. There is no guarding or rebound.      Hernia: No hernia is present.   Musculoskeletal:         General: No swelling, tenderness, deformity or signs of injury. Normal range of motion.      Cervical back: Normal range of motion. No rigidity. No muscular tenderness.    Lymphadenopathy:      Cervical: No cervical adenopathy.   Skin:     General: Skin is warm and dry.      Capillary Refill: Capillary refill takes less than 2 seconds.      Coloration: Skin is not jaundiced or pale.      Findings: No bruising or erythema.   Neurological:      General: No focal deficit present.      Mental Status: He is alert and oriented to person, place, and time. Mental status is at baseline.      Cranial Nerves: No cranial nerve deficit.      Sensory: No sensory deficit.      Motor: No weakness.      Coordination: Coordination normal.      Deep Tendon Reflexes: Reflexes normal.   Psychiatric:         Mood and Affect: Mood normal.         Behavior: Behavior normal.         Thought Content: Thought content normal.         Judgment: Judgment normal.           Additional Data:     Labs:    Results from last 7 days   Lab Units 05/23/24  0629   WBC Thousand/uL 6.93   HEMOGLOBIN g/dL 12.2   HEMATOCRIT % 38.2   PLATELETS Thousands/uL 155   SEGS PCT % 77*   LYMPHO PCT % 10*   MONO PCT % 12   EOS PCT % 0     Results from last 7 days   Lab Units 05/23/24  0629   SODIUM mmol/L 133*   POTASSIUM mmol/L 3.7   CHLORIDE mmol/L 103   CO2 mmol/L 21   BUN mg/dL 13   CREATININE mg/dL 0.78   ANION GAP mmol/L 9   CALCIUM mg/dL 7.6*   ALBUMIN g/dL 2.6*   TOTAL BILIRUBIN mg/dL 0.58   ALK PHOS U/L 279*   ALT U/L 20   AST U/L 91*   GLUCOSE RANDOM mg/dL 84     Results from last 7 days   Lab Units 05/22/24  0357   INR  1.46*     Results from last 7 days   Lab Units 05/18/24  0941   POC GLUCOSE mg/dl 108         Results from last 7 days   Lab Units 05/18/24  0954   LACTIC ACID mmol/L 1.9   PROCALCITONIN ng/ml 1.26*           * I Have Reviewed All Lab Data Listed Above.  * Additional Pertinent Lab Tests Reviewed: All Labs For Current Hospital Admission Reviewed      Recent Cultures (last 7 days):     Results from last 7 days   Lab Units 05/22/24  0416 05/18/24  0955 05/18/24  0954   BLOOD CULTURE  No Growth at 24 hrs.  No  Growth at 24 hrs. No Growth After 4 Days. Streptococcus agalactiae (Group B)*   GRAM STAIN RESULT   --   --  Gram positive cocci in chains*       Last 24 Hours Medication List:   Current Facility-Administered Medications   Medication Dose Route Frequency Provider Last Rate    acetaminophen  650 mg Oral Q6H PRN Marge Echo Saraiya, DO      amiodarone  200 mg Oral Daily With Breakfast [facility]      apixaban  5 mg Oral BID Toby Paz MD      atorvastatin  80 mg Oral Daily With Dinner [facility], DO      cefTRIAXone  2,000 mg Intravenous Q24H Toby Paz MD 2,000 mg (05/23/24 0519)    mirtazapine  30 mg Oral HS Marge Echo Saraiya, DO      pantoprazole  40 mg Oral Early Morning [facility] Sar[facility], DO      temazepam  15 mg Oral HS PRN [facility], [facility]          Today, Patient Was Seen By: Toby Paz MD    ** Please Note: Dictation voice to text software may have been used in the creation of this document. **

## 2024-05-23 NOTE — ASSESSMENT & PLAN NOTE
Follows with vascular surgery outpatient, Ct showing Aortobifemoral stent graft present with probable complete occlusion of the left iliac limb and probable occlusion of the femoral-femoral bypass graft. Per chart review and family, this is known. Peripheral pulses present.   Patient denies claudication  Overall stable  OP vascular follow up

## 2024-05-23 NOTE — PROGRESS NOTES
Patient:  RENATO NIELSEN    MRN:  [id number]    [facility] Request ID:  [id number]    Level of care reserved:    Partner Reserved:    Clinical needs requested:    Geography searched:  50 miles around [id number]    Start of Service:    Request sent:  2:14pm EDT on 5/22/2024 by Clary Cline    Partner reserved:  by Dot Adames    Choice list shared:  5:25pm EDT on 5/23/2024 by Dot Adames

## 2024-05-23 NOTE — CASE MANAGEMENT
Case Management Assessment & Discharge Planning Note    Patient name Tom Pisano  Location /-01 MRN [id number]  : 1955 Date 2024       Current Admission Date: 2024  Current Admission Diagnosis:Liver lesion   Patient Active Problem List    Diagnosis Date Noted Date Diagnosed    Liver lesion 2024     Positive blood culture 2024     Abnormal abdominal CT scan 2024     Severe sepsis (HCC) 2024     Atrial fibrillation (HCC) 2024     PAD (peripheral artery disease) (HCC) 2023     RSV infection 2023     Chronic systolic heart failure (HCC) 2023     Status post endovascular aneurysm repair (EVAR) 2023     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) 2023     Status post femorofemoral bypass surgery 2023     Cardiomyopathy ([facility]) 2023     Hypertension 2023     Bilateral leg edema 2023       LOS (days): 5  Geometric Mean LOS (GMLOS) (days): 5.1  Days to GMLOS:-0.1     OBJECTIVE:    Risk of Unplanned Readmission Score: 16.6         Current admission status: Inpatient       Preferred Pharmacy:   [facility]/pharmacy #[id number] - [place] - [address]  [address]  [place] [address]  Phone: [phone] Fax: [phone]    [facility] [place] [id number]  Phone: [phone] Fax: [phone]    Primary Care Provider: Benedicto Mello MD    Primary Insurance: [employer]  Secondary Insurance: [facility]    ASSESSMENT:  Active Health Care Proxies       Nayla Bolton Health Care Agent - Sister   Primary Phone: [phone] (Home)                                                                Social Determinants of Health (SDOH)      Flowsheet Row Most Recent Value   Housing Stability    In the last 12 months, was there a time when you were not able to pay the mortgage or  rent on time? N   In the past 12 months, how many times have you moved where you were living? 0   At any time in the past 12 months, were you homeless or living in a shelter (including now)? N   Transportation Needs    In the past 12 months, has lack of transportation kept you from medical appointments or from getting medications? no   In the past 12 months, has lack of transportation kept you from meetings, work, or from getting things needed for daily living? No   Food Insecurity    Within the past 12 months, you worried that your food would run out before you got the money to buy more. Never true   Within the past 12 months, the food you bought just didn't last and you didn't have money to get more. Never true   Utilities    In the past 12 months has the electric, gas, oil, or water company threatened to shut off services in your home? No            DISCHARGE DETAILS:    Discharge planning discussed with:: Patient and Sisters  [place] of Choice: Yes  Comments - Freedom of Choice: CM met with the patient and Sisters to review accepting providers for STR and ARC.  The patient and Sister state the plan has changed and he will now d/c to his own home and his Sister is coming to stay for a period of time. Carley from the [email or web address] agency is coming to [facility] for increased Waiver serviices next Tuesday 5/28.  Presently he receives 8a-5pm Monday- Friday.  They are receptive to skilled HHC- a blanket referral made in [facility] and CM will review accepting providers tomorrow.  CM also TT therapy if walker order needed.  Sister states there are no other identified CM needs and she will transport home upon d/c  CM contacted family/caregiver?: Yes  Were Treatment Team discharge recommendations reviewed with patient/caregiver?: Yes  Did patient/caregiver verbalize understanding of patient care needs?: Yes  Were patient/caregiver advised of the risks associated with not following Treatment Team discharge recommendations?:  Yes    Contacts  Patient Contacts: Nayla  Contact Method: In Person  Reason/Outcome: Continuity of Care, Discharge Planning    Requested Home Health Care         Is the patient interested in HHC at discharge?: Yes  Home Health Discipline requested:: Nursing, Occupational Therapy, Physical Therapy  Home Health Agency Name::  ([facility] referral in [facility])  Home Health Follow-Up Provider:: JUANY  Home Health Services Needed:: Evaluate Functional Status and Safety, Strengthening/Theraputic Exercises to Improve Function  Homebound Criteria Met:: Requires the Assistance of Another Person for Safe Ambulation or to Leave the Home, Uses an Assist Device (i.e. cane, walker, etc)  Supporting Clincal Findings:: Fatigues Easliy in Short Distances, Limited Endurance    DME Referral Provided  Referral made for DME?:  (TBD- may need walker- awaiting therapy input.)    Other Referral/Resources/Interventions Provided:  Interventions: Short Term Rehab, Acute Rehab, HHC  Referral Comments: Patient now planning on d/c to home.  His Sister is going to stay for a period of time and he will resume Waiver services and be re-evaluated for increased services as well on 5/28.  [facility] referral in [facility] for HHC    Would you like to participate in our [facility] service program?  : No - Declined    Treatment Team Recommendation: Short Term Rehab, Acute Rehab  Discharge Destination Plan:: Home with Home Health Care  Transport at Discharge : Family

## 2024-05-23 NOTE — ASSESSMENT & PLAN NOTE
Multiple hepatic metastases, developing since 11/30/2023. Retroperitoneal, peripancreatic and vinicius hepatis lymphadenopathy, also new since 11/30/2023.Site of primary malignancy unknown although there is subtle suggestion of a 0.7 x 1.1 cm mass at the junction of the pancreatic head and uncinate process. This can be better evaluated with MRI of the abdomen without and with IV contrast, with MRCP  MRI ordered but unclear if patients endovascular prosthesis is compatible with it - apparently this was done by Myron Cho MD in [facility]     GI consulted, appreciate recommendations -status post EGD and colonoscopy, EGD does reveal an esophageal mass which could be primary.  Biopsies taken, pending.  Oncology consulted

## 2024-05-23 NOTE — PROGRESS NOTES
Progress Note - Tom Pisano 69 y.o. male MRN: [id number]    Unit/Bed#: -01 Encounter: [id number]        Subjective:     Patient seen this AM at the bedside. Family not present during my encounter. He did not recall the results of EGD or colonoscopy. He appears comfortable. He has no complaints.    ROS: As noted in the HPI, otherwise all others negative.    Objective:     Vitals: Blood pressure 130/82, pulse 73, temperature 97.5 °F (36.4 °C), temperature source Temporal, resp. rate 18, height 6' (1.829 m), weight 84.5 kg (186 lb 4.6 oz), SpO2 96%.,Body mass index is 25.27 kg/m².      Intake/Output Summary (Last 24 hours) at 5/23/2024 1417  Last data filed at 5/23/2024 0900  Gross per 24 hour   Intake 980 ml   Output --   Net 980 ml       Physical Exam:     General Appearance: Alert and oriented to person and place. In no respiratory distress  Lungs: Clear to auscultation bilaterally, no rales or rhonchi  Heart: Regular rate and rhythm, S1, S2 normal, no murmur, click, rub or gallop  Abdomen: Soft, non-tender, non-distended; bowel sounds normal; no masses or no organomegaly  Extremities: No cyanosis, edema    Invasive Devices       Peripheral Intravenous Line  Duration             Peripheral IV 05/18/24 Left Antecubital 5 days                    Lab Results:  Results from last 7 days   Lab Units 05/23/24  0629   WBC Thousand/uL 6.93   HEMOGLOBIN g/dL 12.2   HEMATOCRIT % 38.2   PLATELETS Thousands/uL 155   SEGS PCT % 77*   LYMPHO PCT % 10*   MONO PCT % 12   EOS PCT % 0     Results from last 7 days   Lab Units 05/23/24  0629   POTASSIUM mmol/L 3.7   CHLORIDE mmol/L 103   CO2 mmol/L 21   BUN mg/dL 13   CREATININE mg/dL 0.78   CALCIUM mg/dL 7.6*   ALK PHOS U/L 279*   ALT U/L 20   AST U/L 91*     Results from last 7 days   Lab Units 05/22/24  0357   INR  1.46*     Results from last 7 days   Lab Units 05/18/24  0954   LIPASE u/L 8*       Imaging Studies: I have personally reviewed pertinent imaging studies.     Colonoscopy    Result Date: 5/22/2024  Impression: Diverticulosis in the mid sigmoid colon and distal sigmoid colon Four polyps measuring smaller than 5 mm in the proximal ascending colon; bleeding occurred after intervention; performed cold snare removal Two polyps measuring smaller than 5 mm in the proximal transverse colon; bleeding occurred after intervention; performed cold snare removal Three polyps measuring smaller than 5 mm in the mid transverse colon; bleeding occurred after intervention; performed cold snare removal Three polyps measuring smaller than 5 mm in the sigmoid colon; bleeding occurred after intervention; performed cold snare removal The cecum, hepatic flexure, splenic flexure, descending colon, rectosigmoid and rectum appeared normal. RECOMMENDATION: Repeat colonoscopy in 3 years, due: 5/22/2027 Personal history of colon polyps   High-fiber diet to include, vegetables, whole-grain foods, daily Will call the patient in 1 to 2 weeks with results of the polyps  DO EMBER PattersonG, FACP    EGD    Result Date: 5/22/2024  Impression: The cricopharynx, upper third of the esophagus and middle third of the esophagus appeared normal. Malignant-appearing, fungating and ulcerated mass in the lower third of the esophagus, covering the whole circumference; bleeding occurred after intervention; performed cold forceps biopsy Malignant-appearing and fungating mass in the cardia and fundus of the stomach, covering one third of the circumference; bleeding occurred before intervention; performed cold forceps biopsy The body of the stomach, incisura, antrum, prepyloric region and pylorus appeared normal. The duodenal bulb and 2nd part of the duodenum appeared normal. RECOMMENDATION: 1.  I have sent the Specimens to the pathology section with a stat request 2.  No indication for percutaneous biopsy of the liver   DO EMBER PattersonG, FACP     XR chest 1 view portable    Result Date:  5/18/2024  Impression: No acute cardiopulmonary disease. Workstation performed: [id number]     CT chest abdomen pelvis w contrast    Result Date: 5/18/2024  Impression: 1.  Multiple hepatic metastases, developing since 11/30/2023. 2.  Retroperitoneal, peripancreatic and vinicius hepatis lymphadenopathy, also new since 11/30/2023. 3.  Site of primary malignancy unknown although there is subtle suggestion of a 0.7 x 1.1 cm mass at the junction of the pancreatic head and uncinate process. This can be better evaluated with MRI of the abdomen without and with IV contrast, with MRCP. 4.  Aortobifemoral stent graft present with probable complete occlusion of the left iliac limb and probable occlusion of the femoral-femoral bypass graft. 5.  Severe emphysema. 6.  Other than subsegmental atelectasis in the bases of the both lower lobes, no evidence of acute abnormality in the chest. I personally discussed this study with MAUREEN AMARO on 5/18/2024 12:27 PM. Workstation performed: [id number]     CT head without contrast    Result Date: 5/18/2024  Impression: No acute intracranial abnormality. Workstation performed: [id number]         Assessment and Plan:     1) Abdominal pain and cachexia secondary to esophageal mass suspicious for metastatic disease-  On admission revealing multiple enlarged lymph nodes developing since November 2023 near the vinicius hepatis and SMA.  In addition, there are several masses within the liver the largest measuring 3 x 5.5 cm.  Bile ducts appear normal in caliber.  No gallstones.  On the pancreas there is a questionable 0.7 x 1.1 cm structure near the uncinate process.  No convincing evidence of intra pancreatic tumor according to report.  Primary malignancy unknown.  There is extensive family history of malignancy.  It has been more than 15 years since he had a colonoscopy. EGD revealing a malignant appearing, fungating and ulcerated mass in the lower third of the esophagus.  Colonoscopy  "without lesions.  - MRI could not be performed as we cannot access records from his stent that was placed 10 years ago while in [place], therefore the MRI order was canceled  - Oncology has been consulted, appreciate Dr Fuentes's recommendations   - Follow-up pathology, Dr Tam ordered the path STAT. Will follow-up biopsy outpatient.  - GI will sign off      Portions of the record may have been created with voice recognition software.  Occasional wrong word or \"sound a like\" substitutions may have occurred due to the inherent limitations of voice recognition software.  Read the chart carefully and recognize, using context, where substitutions have occurred.    "

## 2024-05-23 NOTE — PLAN OF CARE
Problem: SAFETY ADULT  Goal: Patient will remain free of falls  Description: INTERVENTIONS:  - Educate patient/family on patient safety including physical limitations  - Instruct patient to call for assistance with activity   - Consult OT/PT to assist with strengthening/mobility   - Keep Call bell within reach  - Keep bed low and locked with side rails adjusted as appropriate  - Keep care items and personal belongings within reach  - Initiate and maintain comfort rounds  - Make Fall Risk Sign visible to staff  - Offer Toileting every 2 Hours, in advance of need  - Initiate/Maintain chair/bed alarm  - Obtain necessary fall risk management equipment: NA  - Apply yellow socks and bracelet for high fall risk patients  - Consider moving patient to room near nurses station  Outcome: Progressing

## 2024-05-23 NOTE — ASSESSMENT & PLAN NOTE
Wt Readings from Last 3 Encounters:   05/23/24 84.5 kg (186 lb 4.6 oz)   12/04/23 83.5 kg (184 lb)   08/29/23 87.1 kg (192 lb)     Holding home dose Lasix and antihypertensive regimen in setting of hypotension  Hold off on additional IV fluids for now, appears euvolemic  Monitor intake and output closely

## 2024-05-23 NOTE — ASSESSMENT & PLAN NOTE
As evidenced by tachycardia and leukocytosis, Chest CT negative for acute infectious etiology. Family reports diarrhea, patient has had no BM since admission.   Patient received IV ceftriaxone  S/p 30 cc/kg Iv fluids, continue with IV fluids  1/2 blood cultures + gram positive cocci in chains    Given positive Bcx and elevated procal will keep on high dose ceftriaxone for now at least until repeat Bcx done - collected 5/22 and I expect to follow these results until finalized -so far negative

## 2024-05-23 NOTE — ASSESSMENT & PLAN NOTE
1/2 blood culture + gram positive cocci, unclear if this is true but given concern for severe sepsis on admission and elevated procalcitonin will continue antibiotic    Repeat cultures so far negative.  Monitor

## 2024-05-24 ENCOUNTER — DOCUMENTATION (OUTPATIENT)
Dept: HEMATOLOGY ONCOLOGY | Facility: CLINIC | Age: 69
End: 2024-05-24

## 2024-05-24 ENCOUNTER — TELEPHONE (OUTPATIENT)
Dept: HEMATOLOGY ONCOLOGY | Facility: CLINIC | Age: 69
End: 2024-05-24

## 2024-05-24 LAB
AMIODARONE SERPL-MCNC: 149 NG/ML (ref 1000–2500)
ANION GAP SERPL CALCULATED.3IONS-SCNC: 8 MMOL/L (ref 4–13)
BACTERIA BLD CULT: NORMAL
BASOPHILS # BLD AUTO: 0.01 THOUSANDS/ÂΜL (ref 0–0.1)
BASOPHILS NFR BLD AUTO: 0 % (ref 0–1)
BUN SERPL-MCNC: 11 MG/DL (ref 5–25)
CALCIUM SERPL-MCNC: 7.5 MG/DL (ref 8.4–10.2)
CHLORIDE SERPL-SCNC: 104 MMOL/L (ref 96–108)
CO2 SERPL-SCNC: 23 MMOL/L (ref 21–32)
CREAT SERPL-MCNC: 0.69 MG/DL (ref 0.6–1.3)
DESETHYLAMIODARONE SERPL-MCNC: 209 NG/ML
DME PARACHUTE DELIVERY DATE REQUESTED: NORMAL
DME PARACHUTE ITEM DESCRIPTION: NORMAL
DME PARACHUTE ORDER STATUS: NORMAL
DME PARACHUTE SUPPLIER NAME: NORMAL
DME PARACHUTE SUPPLIER PHONE: NORMAL
EOSINOPHIL # BLD AUTO: 0 THOUSAND/ÂΜL (ref 0–0.61)
EOSINOPHIL NFR BLD AUTO: 0 % (ref 0–6)
ERYTHROCYTE [DISTWIDTH] IN BLOOD BY AUTOMATED COUNT: 18.2 % (ref 11.6–15.1)
GFR SERPL CREATININE-BSD FRML MDRD: 96 ML/MIN/1.73SQ M
GLUCOSE SERPL-MCNC: 85 MG/DL (ref 65–140)
HCT VFR BLD AUTO: 36.6 % (ref 36.5–49.3)
HGB BLD-MCNC: 11.6 G/DL (ref 12–17)
IMM GRANULOCYTES # BLD AUTO: 0.04 THOUSAND/UL (ref 0–0.2)
IMM GRANULOCYTES NFR BLD AUTO: 1 % (ref 0–2)
LYMPHOCYTES # BLD AUTO: 0.8 THOUSANDS/ÂΜL (ref 0.6–4.47)
LYMPHOCYTES NFR BLD AUTO: 13 % (ref 14–44)
MCH RBC QN AUTO: 26.1 PG (ref 26.8–34.3)
MCHC RBC AUTO-ENTMCNC: 31.7 G/DL (ref 31.4–37.4)
MCV RBC AUTO: 82 FL (ref 82–98)
MONOCYTES # BLD AUTO: 0.91 THOUSAND/ÂΜL (ref 0.17–1.22)
MONOCYTES NFR BLD AUTO: 14 % (ref 4–12)
NEUTROPHILS # BLD AUTO: 4.62 THOUSANDS/ÂΜL (ref 1.85–7.62)
NEUTS SEG NFR BLD AUTO: 72 % (ref 43–75)
NRBC BLD AUTO-RTO: 0 /100 WBCS
PLATELET # BLD AUTO: 143 THOUSANDS/UL (ref 149–390)
PMV BLD AUTO: 11 FL (ref 8.9–12.7)
POTASSIUM SERPL-SCNC: 3.4 MMOL/L (ref 3.5–5.3)
RBC # BLD AUTO: 4.45 MILLION/UL (ref 3.88–5.62)
SODIUM SERPL-SCNC: 135 MMOL/L (ref 135–147)
WBC # BLD AUTO: 6.38 THOUSAND/UL (ref 4.31–10.16)

## 2024-05-24 PROCEDURE — 85025 COMPLETE CBC W/AUTO DIFF WBC: CPT | Performed by: INTERNAL MEDICINE

## 2024-05-24 PROCEDURE — 88341 IMHCHEM/IMCYTCHM EA ADD ANTB: CPT | Performed by: PATHOLOGY

## 2024-05-24 PROCEDURE — 97116 GAIT TRAINING THERAPY: CPT

## 2024-05-24 PROCEDURE — 88360 TUMOR IMMUNOHISTOCHEM/MANUAL: CPT | Performed by: PATHOLOGY

## 2024-05-24 PROCEDURE — 88305 TISSUE EXAM BY PATHOLOGIST: CPT | Performed by: PATHOLOGY

## 2024-05-24 PROCEDURE — 88313 SPECIAL STAINS GROUP 2: CPT | Performed by: PATHOLOGY

## 2024-05-24 PROCEDURE — 97110 THERAPEUTIC EXERCISES: CPT

## 2024-05-24 PROCEDURE — 88342 IMHCHEM/IMCYTCHM 1ST ANTB: CPT | Performed by: PATHOLOGY

## 2024-05-24 PROCEDURE — 80048 BASIC METABOLIC PNL TOTAL CA: CPT | Performed by: INTERNAL MEDICINE

## 2024-05-24 PROCEDURE — 99233 SBSQ HOSP IP/OBS HIGH 50: CPT | Performed by: INTERNAL MEDICINE

## 2024-05-24 RX ORDER — POTASSIUM CHLORIDE 20 MEQ/1
20 TABLET, EXTENDED RELEASE ORAL ONCE
Status: COMPLETED | OUTPATIENT
Start: 2024-05-24 | End: 2024-05-24

## 2024-05-24 RX ADMIN — MIRTAZAPINE 30 MG: 15 TABLET, FILM COATED ORAL at 21:25

## 2024-05-24 RX ADMIN — ATORVASTATIN CALCIUM 80 MG: 40 TABLET, FILM COATED ORAL at 17:28

## 2024-05-24 RX ADMIN — POTASSIUM CHLORIDE 20 MEQ: 1500 TABLET, EXTENDED RELEASE ORAL at 08:56

## 2024-05-24 RX ADMIN — PANTOPRAZOLE SODIUM 40 MG: 40 TABLET, DELAYED RELEASE ORAL at 05:50

## 2024-05-24 RX ADMIN — CEFTRIAXONE SODIUM 2000 MG: 10 INJECTION, POWDER, FOR SOLUTION INTRAVENOUS at 05:50

## 2024-05-24 RX ADMIN — AMIODARONE HYDROCHLORIDE 200 MG: 200 TABLET ORAL at 08:56

## 2024-05-24 RX ADMIN — APIXABAN 5 MG: 5 TABLET, FILM COATED ORAL at 08:56

## 2024-05-24 RX ADMIN — APIXABAN 5 MG: 5 TABLET, FILM COATED ORAL at 17:28

## 2024-05-24 NOTE — PLAN OF CARE
Problem: CARDIOVASCULAR - ADULT  Goal: Maintains optimal cardiac output and hemodynamic stability  Description: INTERVENTIONS:  - Monitor I/O, vital signs and rhythm  - Monitor for S/S and trends of decreased cardiac output  - Administer and titrate ordered vasoactive medications to optimize hemodynamic stability  - Assess quality of pulses, skin color and temperature  - Assess for signs of decreased coronary artery perfusion  - Instruct patient to report change in severity of symptoms  Outcome: Progressing  Goal: Absence of cardiac dysrhythmias or at baseline rhythm  Description: INTERVENTIONS:  - Continuous cardiac monitoring, vital signs, obtain 12 lead EKG if ordered  - Administer antiarrhythmic and heart rate control medications as ordered  - Monitor electrolytes and administer replacement therapy as ordered  Outcome: Progressing     Problem: METABOLIC, FLUID AND ELECTROLYTES - ADULT  Goal: Electrolytes maintained within normal limits  Description: INTERVENTIONS:  - Monitor labs and assess patient for signs and symptoms of electrolyte imbalances  - Administer electrolyte replacement as ordered  - Monitor response to electrolyte replacements, including repeat lab results as appropriate  - Instruct patient on fluid and nutrition as appropriate  Outcome: Progressing  Goal: Fluid balance maintained  Description: INTERVENTIONS:  - Monitor labs   - Monitor I/O and WT  - Instruct patient on fluid and nutrition as appropriate  - Assess for signs & symptoms of volume excess or deficit  Outcome: Progressing     Problem: PAIN - ADULT  Goal: Verbalizes/displays adequate comfort level or baseline comfort level  Description: Interventions:  - Encourage patient to monitor pain and request assistance  - Assess pain using appropriate pain scale  - Administer analgesics based on type and severity of pain and evaluate response  - Implement non-pharmacological measures as appropriate and evaluate response  - Consider cultural  and social influences on pain and pain management  - Notify physician/advanced practitioner if interventions unsuccessful or patient reports new pain  Outcome: Progressing     Problem: INFECTION - ADULT  Goal: Absence or prevention of progression during hospitalization  Description: INTERVENTIONS:  - Assess and monitor for signs and symptoms of infection  - Monitor lab/diagnostic results  - Monitor all insertion sites, i.e. indwelling lines, tubes, and drains  - Monitor endotracheal if appropriate and nasal secretions for changes in amount and color  - [place] appropriate cooling/warming therapies per order  - Administer medications as ordered  - Instruct and encourage patient and family to use good hand hygiene technique  - Identify and instruct in appropriate isolation precautions for identified infection/condition  Outcome: Progressing  Goal: Absence of fever/infection during neutropenic period  Description: INTERVENTIONS:  - Monitor WBC    Outcome: Progressing     Problem: SAFETY ADULT  Goal: Patient will remain free of falls  Description: INTERVENTIONS:  - Educate patient/family on patient safety including physical limitations  - Instruct patient to call for assistance with activity   - Consult OT/PT to assist with strengthening/mobility   - Keep Call bell within reach  - Keep bed low and locked with side rails adjusted as appropriate  - Keep care items and personal belongings within reach  - Initiate and maintain comfort rounds  - Make Fall Risk Sign visible to staff  - Offer Toileting every 2 Hours, in advance of need  - Initiate/Maintain bed alarm  - Obtain necessary fall risk management equipment: walker  - Apply yellow socks and bracelet for high fall risk patients  - Consider moving patient to room near nurses station  Outcome: Progressing  Goal: Maintain or return to baseline ADL function  Description: INTERVENTIONS:  -  Assess patient's ability to carry out ADLs; assess patient's baseline for ADL  function and identify physical deficits which impact ability to perform ADLs (bathing, care of mouth/teeth, toileting, grooming, dressing, etc.)  - Assess/evaluate cause of self-care deficits   - Assess range of motion  - Assess patient's mobility; develop plan if impaired  - Assess patient's need for assistive devices and provide as appropriate  - Encourage maximum independence but intervene and supervise when necessary  - Involve family in performance of ADLs  - Assess for home care needs following discharge   - Consider OT consult to assist with ADL evaluation and planning for discharge  - Provide patient education as appropriate  Outcome: Progressing  Goal: Maintains/Returns to pre admission functional level  Description: INTERVENTIONS:  - Perform AM-PAC 6 Click Basic Mobility/ Daily Activity assessment daily.  - Set and communicate daily mobility goal to care team and patient/family/caregiver.   - Collaborate with rehabilitation services on mobility goals if consulted  - Perform Range of Motion 2 times a day.  - Reposition patient every 2 hours.  - Dangle patient 2 times a day  - Stand patient 2 times a day  - Ambulate patient 2 times a day  - Out of bed to chair 2 times a day   - Out of bed for meals 3 times a day  - Out of bed for toileting  - Record patient progress and toleration of activity level   Outcome: Progressing

## 2024-05-24 NOTE — PLAN OF CARE
Problem: CARDIOVASCULAR - ADULT  Goal: Maintains optimal cardiac output and hemodynamic stability  Description: INTERVENTIONS:  - Monitor I/O, vital signs and rhythm  - Monitor for S/S and trends of decreased cardiac output  - Administer and titrate ordered vasoactive medications to optimize hemodynamic stability  - Assess quality of pulses, skin color and temperature  - Assess for signs of decreased coronary artery perfusion  - Instruct patient to report change in severity of symptoms  Outcome: Progressing  Goal: Absence of cardiac dysrhythmias or at baseline rhythm  Description: INTERVENTIONS:  - Continuous cardiac monitoring, vital signs, obtain 12 lead EKG if ordered  - Administer antiarrhythmic and heart rate control medications as ordered  - Monitor electrolytes and administer replacement therapy as ordered  Outcome: Progressing     Problem: METABOLIC, FLUID AND ELECTROLYTES - ADULT  Goal: Electrolytes maintained within normal limits  Description: INTERVENTIONS:  - Monitor labs and assess patient for signs and symptoms of electrolyte imbalances  - Administer electrolyte replacement as ordered  - Monitor response to electrolyte replacements, including repeat lab results as appropriate  - Instruct patient on fluid and nutrition as appropriate  Outcome: Progressing  Goal: Fluid balance maintained  Description: INTERVENTIONS:  - Monitor labs   - Monitor I/O and WT  - Instruct patient on fluid and nutrition as appropriate  - Assess for signs & symptoms of volume excess or deficit  Outcome: Progressing     Problem: PAIN - ADULT  Goal: Verbalizes/displays adequate comfort level or baseline comfort level  Description: Interventions:  - Encourage patient to monitor pain and request assistance  - Assess pain using appropriate pain scale  - Administer analgesics based on type and severity of pain and evaluate response  - Implement non-pharmacological measures as appropriate and evaluate response  - Consider cultural  and social influences on pain and pain management  - Notify physician/advanced practitioner if interventions unsuccessful or patient reports new pain  Outcome: Progressing     Problem: INFECTION - ADULT  Goal: Absence or prevention of progression during hospitalization  Description: INTERVENTIONS:  - Assess and monitor for signs and symptoms of infection  - Monitor lab/diagnostic results  - Monitor all insertion sites, i.e. indwelling lines, tubes, and drains  - Monitor endotracheal if appropriate and nasal secretions for changes in amount and color  - [place] appropriate cooling/warming therapies per order  - Administer medications as ordered  - Instruct and encourage patient and family to use good hand hygiene technique  - Identify and instruct in appropriate isolation precautions for identified infection/condition  Outcome: Progressing  Goal: Absence of fever/infection during neutropenic period  Description: INTERVENTIONS:  - Monitor WBC    Outcome: Progressing     Problem: SAFETY ADULT  Goal: Patient will remain free of falls  Description: INTERVENTIONS:  - Educate patient/family on patient safety including physical limitations  - Instruct patient to call for assistance with activity   - Consult OT/PT to assist with strengthening/mobility   - Keep Call bell within reach  - Keep bed low and locked with side rails adjusted as appropriate  - Keep care items and personal belongings within reach  - Initiate and maintain comfort rounds  - Make Fall Risk Sign visible to staff  - Offer Toileting every  Hours, in advance of need  - Initiate/Maintain alarm  - Obtain necessary fall risk management equipment:   - Apply yellow socks and bracelet for high fall risk patients  - Consider moving patient to room near nurses station  Outcome: Progressing  Goal: Maintain or return to baseline ADL function  Description: INTERVENTIONS:  -  Assess patient's ability to carry out ADLs; assess patient's baseline for ADL function and  identify physical deficits which impact ability to perform ADLs (bathing, care of mouth/teeth, toileting, grooming, dressing, etc.)  - Assess/evaluate cause of self-care deficits   - Assess range of motion  - Assess patient's mobility; develop plan if impaired  - Assess patient's need for assistive devices and provide as appropriate  - Encourage maximum independence but intervene and supervise when necessary  - Involve family in performance of ADLs  - Assess for home care needs following discharge   - Consider OT consult to assist with ADL evaluation and planning for discharge  - Provide patient education as appropriate  Outcome: Progressing  Goal: Maintains/Returns to pre admission functional level  Description: INTERVENTIONS:  - Perform AM-PAC 6 Click Basic Mobility/ Daily Activity assessment daily.  - Set and communicate daily mobility goal to care team and patient/family/caregiver.   - Collaborate with rehabilitation services on mobility goals if consulted  - Perform Range of Motion  times a day.  - Reposition patient every  hours.  - Dangle patient  times a day  - Stand patient  times a day  - Ambulate patient  times a day  - Out of bed to chair  times a day   - Out of bed for meals times a day  - Out of bed for toileting  - Record patient progress and toleration of activity level   Outcome: Progressing     Problem: Knowledge Deficit  Goal: Patient/family/caregiver demonstrates understanding of disease process, treatment plan, medications, and discharge instructions  Description: Complete learning assessment and assess knowledge base.  Interventions:  - Provide teaching at level of understanding  - Provide teaching via preferred learning methods  Outcome: Progressing

## 2024-05-24 NOTE — TELEPHONE ENCOUNTER
"Soft Intake Form   Patient Details   Tom Pisano     1955     Reason For Appointment   Who is Calling? Amarilis Cantu   If not patient, Name?  NA   DID YOU CONFIRM INSURANCE WITH PATIENT? E verified, Routed to finance   Who is the Referring Doctor? Dr. Fuentes   What is the diagnosis? 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    If yes, what is the date it was done? Stomach bx taken on 5/22   Biopsy done at [facility]?  If not, where was it done? Yes   Was imaging done, and was it done at [facility]?  If not, where was it done? Yes-[facility]   Have you been seen by another Oncologist?  If so, who and where (name of facility, city and state) No   For 2nd Opinions Only: Are you currently undergoing treatment, or are you scheduled to start treatment?  If yes, name of facility, city and state NA    For \"History Of\" only: Have you completed treatment?  NA   Have you had Genetic Testing done in the past?  If so, advise to bring test results to their visit NA   Record Gathering Information   Did you advise to have records faxed to [phone]?  NA   Did you advise to have disks sent to the proper address with imaging? (\"History of\" Patients)  5 years of imaging for breast patients-Mammos, US etc NA   Scheduling Information   Did you send new patient paperwork?  Email or mail? NA   What is the best call back number?   (If the [facility] is calling, please use their number) NA   Miscellaneous Information      The patient is scheduled for his HFU appointment on 5/29 at 0900 with Dr Fuentes in the [place] office.     "

## 2024-05-24 NOTE — PROGRESS NOTES
Intake received/ Chart reviewed for services completed outside of [facility]    Pathology completed: 5/22/2024    Imaging completed: 5/18/2024 CT CAP    All records needed are in patients chart. No records retrieval needed at this time.

## 2024-05-24 NOTE — PHYSICAL THERAPY NOTE
"   Physical Therapy Treatment Note    Patient Name: Tom Pisano    Diagnosis: Hypotension [I95.9]  Cancer, metastatic to liver (HCC) [C78.7]  Failure to thrive in adult [R62.7]  ANNETTA (acute kidney injury) (HCC) [N17.9]  AMS (altered mental status) [R41.82]     05/24/24 0955   PT Last Visit   PT Visit Date 05/24/24   Note Type   Note Type Treatment   Pain Assessment   Pain Assessment Tool 0-10   Pain Score No Pain   Restrictions/Precautions   Weight Bearing Precautions Per Order No   Other Precautions Cognitive;Chair Alarm;Bed Alarm;Fall Risk   General   Chart Reviewed Yes   Response to Previous Treatment Patient unable to report, no changes reported from family or staff   Family/Caregiver Present Yes   Cognition   Overall Cognitive Status Impaired   Arousal/Participation Alert;Responsive;Cooperative   Attention Within functional limits   Orientation Level Disoriented to time;Disoriented to situation;Oriented to time   Memory Decreased recall of recent events;Decreased short term memory   Following Commands Follows one step commands without difficulty   Comments Pt agreeable to PT.   Subjective   Subjective \"I want to go home.\"   Bed Mobility   Supine to Sit 5  Supervision   Additional items Assist x 1;HOB elevated;Bedrails;Increased time required;Verbal cues   Sit to Supine 5  Supervision   Additional items Assist x 1;HOB elevated;Increased time required;Verbal cues   Transfers   Sit to Stand 5  Supervision   Additional items Assist x 1;Increased time required;Verbal cues   Stand to Sit 5  Supervision   Additional items Assist x 1;Increased time required;Verbal cues   Ambulation/Elevation   Gait pattern Short stride;Shuffling   Gait Assistance   (CG assist)   Additional items Assist x 1;Verbal cues   Assistive Device Rolling walker;None   Distance 160 feet with a walker and 60 feet without an AD   Stair Management Assistance 4  Minimal assist   Additional items Assist x 1;Verbal cues   Stair Management " Technique Alternating pattern;Foreward;One rail R   Number of Stairs 5   Balance   Static Sitting Good   Dynamic Sitting Fair +   Static Standing Fair   Dynamic Standing Fair -   Ambulatory Fair -   Endurance Deficit   Endurance Deficit Yes   Endurance Deficit Description decreased activity tolerance   Activity Tolerance   Activity Tolerance Patient tolerated treatment well;Patient limited by fatigue   Exercises   Heelslides Supine;10 reps;AROM;Bilateral   Hip Flexion Standing;10 reps;AROM;Bilateral   Hip Abduction Supine;10 reps;AROM;Bilateral   Ankle Pumps Standing;20 reps;AROM;Bilateral  (stnading heel raises)   Assessment   Prognosis Good   Problem List Decreased strength;Decreased endurance;Impaired balance;Decreased mobility;Decreased cognition   Assessment Chart reviewed. Patient was received supine in bed in NAD and agreeable to PT session. Today's PT treatment session consisted of therapeutic activity for facilitation of transitional movements and safe performance of correct technique for bed mobility and sit to stand transfers, therapeutic exercise to increase lower extremity muscle strength, and gait training to promote safe and functional ambulation on level surfaces. In comparison to the previous session the patient has made progress as evident by requiring decreased assistance with all functional mobility. Pt was able to ambulate an increased distance with use of RW. Pt also performed an ambulation trial without an AD. When ambulating pt exhibits decreased genaro and decreased stride length. Pt tolerated all therapeutic exercise well this session. Overall, patient tolerated today's session well and continues to be making progress towards achieving his STG's. Pt's STG's were updated this session. Patient's prognosis for achieving their STG's is good as evident by good family support. PT intervention continues to be appropriate as the patient continues to be limited by decreased lower extremity  strength, impaired balance, decreased endurance, gait deviations, and decreased functional mobility. Recommend level 3, minimal resource intensity and 24/7 assistance/supervision. PT to continue to see patient in order to address the deficits listed above and provide interventions consistent with the POC in order to achieve STG's and optimize the patient's independence with functional mobility.   Barriers to Discharge Decreased caregiver support   Barriers to Discharge Comments pt's family at bedside reports that they have made arrangements for 24/7 caregiver assistance   Goals   Patient Goals to go home   STG Expiration Date 06/03/24   Short Term Goal #1 In 10 days: Increase bilateral LE strength 1/2 grade to facilitate independent mobility, Perform all bed mobility tasks modified independent to decrease caregiver burden, Perform all transfers modified independent to improve independence, Ambulate > 250 ft. with least restrictive assistive device modified independent w/o LOB and w/ normalized gait pattern 100% of the time, Navigate 5 stairs modified independent with unilateral handrail to facilitate return to previous living environment, and Increase all balance 1/2 grade to decrease risk for falls   PT Treatment Day 2   Plan   Treatment/Interventions Functional transfer training;LE strengthening/ROM;Elevations;Therapeutic exercise;Endurance training;Cognitive reorientation;Patient/family training;Bed mobility;Gait training;OT;Family   Progress Progressing toward goals   PT Frequency 2-3x/wk   Discharge Recommendation   Rehab Resource Intensity Level, PT III (Minimum Resource Intensity)  (and 24/7 assistance/supervision)   AM-PAC Basic Mobility Inpatient   Turning in Flat Bed Without Bedrails 4   Lying on Back to Sitting on Edge of Flat Bed Without Bedrails 3   Moving Bed to Chair 3   Standing Up From Chair Using Arms 3   Walk in Room 3   Climb 3-5 Stairs With Railing 3   Basic Mobility Inpatient Raw Score 19    Basic Mobility Standardized Score 42.48   [facility] Highest Level Of Mobility   -HLM Goal 6: Walk 10 steps or more   JH-HLM Achieved 7: Walk 25 feet or more   Education   Education Provided Mobility training;Home exercise program;Assistive device   Patient Demonstrates acceptance/verbal understanding;Reinforcement needed   End of Consult   Patient Position at End of Consult Supine;Bed/Chair alarm activated;All needs within reach  (family at bedside)     Carole Queen, PT, DPT    Time of PT treatment session: [id number]  24 minutes

## 2024-05-24 NOTE — PROGRESS NOTES
Chart rvw'd on 5/24/24    Referring provider-  HFU       EGD date-  5/21/24      Impression-  The cricopharynx, upper third of the esophagus and middle third of the esophagus appeared normal.  Malignant-appearing, fungating and ulcerated mass in the lower third of the esophagus, covering the whole circumference; bleeding occurred after intervention; performed cold forceps biopsy  Malignant-appearing and fungating mass in the cardia and fundus of the stomach, covering one third of the circumference; bleeding occurred before intervention; performed cold forceps biopsy  The body of the stomach, incisura, antrum, prepyloric region and pylorus appeared normal.  The duodenal bulb and 2nd part of the duodenum appeared normal.      Pathology-  Final Diagnosis   A. Stomach, Stomach mass, Biopsy:  - Poorly differentiated carcinoma.      B. 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  - Alcian blue/PAS special stain confirms the presence of goblet cells.      Comment: Given the clinical impression of a mass, and the superficial nature of the tissue sample, the biopsy may not be representative of the entire clinically significant lesion.  Clinical correlation is suggested. Re-biopsy may be considered to exclude an unsampled higher grade lesion.     C. Colon, Proximal ascending x4, Polypectomy:  - Fragments of tubular adenoma(s).  - Negative for high grade dysplasia.      D. Colon, Proximal transverse x2, Polypectomy:  - Fragments of tubular adenoma(s).  - Negative for high grade dysplasia.      E. Colon, Mid transverse x3, Polypectomy:  - Fragments of tubular adenoma(s).  - Negative for high grade dysplasia.      F. Colon, Mid signmoid x3, Polypectomy:  - Tubular adenoma.  - Inflammatory polyp.  - Negative for high grade dysplasia.    Electronically signed by Ilene Armando MD on 5/24/2024 at 10:47 AM         Labs-  5/23/24 - CMP and CBC       Imaging-  5/18/24 - CT CHEST, ABDOMEN AND  PELVIS WITH IV CONTRAST    IMPRESSION:     1.  Multiple hepatic metastases, developing since 11/30/2023.     2.  Retroperitoneal, peripancreatic and vinicius hepatis lymphadenopathy, also new since 11/30/2023.     3.  Site of primary malignancy unknown although there is subtle suggestion of a 0.7 x 1.1 cm mass at the junction of the pancreatic head and uncinate process. This can be better evaluated with MRI of the abdomen without and with IV contrast, with MRCP.     4.  Aortobifemoral stent graft present with probable complete occlusion of the left iliac limb and probable occlusion of the femoral-femoral bypass graft.     5.  Severe emphysema.     6.  Other than subsegmental atelectasis in the bases of the both lower lobes, no evidence of acute abnormality in the chest.         Scheduled appointments-

## 2024-05-24 NOTE — PROGRESS NOTES
Patient:  RENATO NIELSEN    MRN:  [id number]    Aidin Request ID:  [id number]    Level of care reserved:    Partner Reserved:    Clinical needs requested:    Geography searched:  [id number]    Start of Service:    Request sent:  5:24pm EDT on 5/23/2024 by Dot Adames    Partner reserved:  by Dot Adames    Choice list shared:  10:51am EDT on 5/24/2024 by Dot Adames

## 2024-05-24 NOTE — PLAN OF CARE
Problem: PHYSICAL THERAPY ADULT  Goal: Performs mobility at highest level of function for planned discharge setting.  See evaluation for individualized goals.  Description: Treatment/Interventions: Functional transfer training, LE strengthening/ROM, Therapeutic exercise, Endurance training, Cognitive reorientation, Patient/family training, Bed mobility, Gait training, Spoke to nursing, OT, Family          See flowsheet documentation for full assessment, interventions and recommendations.  Outcome: Progressing  Note: Prognosis: Good  Problem List: Decreased strength, Decreased endurance, Impaired balance, Decreased mobility, Decreased cognition  Assessment: Chart reviewed. Patient was received supine in bed in NAD and agreeable to PT session. Today's PT treatment session consisted of therapeutic activity for facilitation of transitional movements and safe performance of correct technique for bed mobility and sit to stand transfers, therapeutic exercise to increase lower extremity muscle strength, and gait training to promote safe and functional ambulation on level surfaces. In comparison to the previous session the patient has made progress as evident by requiring decreased assistance with all functional mobility. Pt was able to ambulate an increased distance with use of RW. Pt also performed an ambulation trial without an AD. When ambulating pt exhibits decreased genaro and decreased stride length. Pt tolerated all therapeutic exercise well this session. Overall, patient tolerated today's session well and continues to be making progress towards achieving his STG's. Pt's STG's were updated this session. Patient's prognosis for achieving their STG's is good as evident by good family support. PT intervention continues to be appropriate as the patient continues to be limited by decreased lower extremity strength, impaired balance, decreased endurance, gait deviations, and decreased functional mobility. Recommend  level 3, minimal resource intensity and 24/7 assistance/supervision. PT to continue to see patient in order to address the deficits listed above and provide interventions consistent with the POC in order to achieve STG's and optimize the patient's independence with functional mobility.    Barriers to Discharge: Decreased caregiver support  Barriers to Discharge Comments: pt's family at bedside reports that they have made arrangements for 24/7 caregiver assistance    Rehab Resource Intensity Level, PT: III (Minimum Resource Intensity) (and 24/7 assistance/supervision)    See flowsheet documentation for full assessment.

## 2024-05-24 NOTE — ASSESSMENT & PLAN NOTE
Hx of a fib on eliquis and amiodarone daily  EKG with NSR and PAC  Given that blood pressure is stable    Continue PO amiodarone  Continue Eliquis

## 2024-05-24 NOTE — ARC ADMISSION
Notified by CM that patient/family prefer discharge home with C at this time. Please notify with any changes.

## 2024-05-24 NOTE — CASE MANAGEMENT
Case Management Assessment & Discharge Planning Note    Patient name Tom Pisano  Location /-01 MRN [id number]  : 1955 Date 2024       Current Admission Date: 2024  Current Admission Diagnosis:Liver lesion   Patient Active Problem List    Diagnosis Date Noted Date Diagnosed    Liver lesion 2024     Positive blood culture 2024     Abnormal abdominal CT scan 2024     Severe sepsis (HCC) 2024     Atrial fibrillation (HCC) 2024     PAD (peripheral artery disease) (HCC) 2023     RSV infection 2023     Chronic systolic heart failure (HCC) 2023     Status post endovascular aneurysm repair (EVAR) 2023     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) 2023     Status post femorofemoral bypass surgery 2023     Cardiomyopathy ([facility]) 2023     Hypertension 2023     Bilateral leg edema 2023       LOS (days): 6  Geometric Mean LOS (GMLOS) (days): 5.1  Days to GMLOS:-1.1     OBJECTIVE:    Risk of Unplanned Readmission Score: 18.61         Current admission status: Inpatient       Preferred Pharmacy:   [id number] - [place] - [address]  [address]  [place] [address]  Phone: [phone] Fax: [phone]    [facility] [place] [id number]  Phone: [phone] Fax: [phone]    Primary Care Provider: Benedicto Mello MD    Primary Insurance: [email or web address]  REP  Secondary Insurance: [facility]    ASSESSMENT:  Active Health Care Proxies       Nayla Bolton Health Care Agent - Sister   Primary Phone: [phone] (Home)                 Social Determinants of Health (SDOH)      Flowsheet Row Most Recent Value   Housing Stability    In the last 12 months, was there a time when you were not able to pay the mortgage or rent on time? N   In the past 12 months, how  many times have you moved where you were living? 0   At any time in the past 12 months, were you homeless or living in a shelter (including now)? N   Transportation Needs    In the past 12 months, has lack of transportation kept you from medical appointments or from getting medications? no   In the past 12 months, has lack of transportation kept you from meetings, work, or from getting things needed for daily living? No   Food Insecurity    Within the past 12 months, you worried that your food would run out before you got the money to buy more. Never true   Within the past 12 months, the food you bought just didn't last and you didn't have money to get more. Never true   Utilities    In the past 12 months has the electric, gas, oil, or water company threatened to shut off services in your home? No            DISCHARGE DETAILS:    Discharge planning discussed with:: patient and siter  Freedom of Choice: Yes  Comments - Freedom of Choice: CM alerted patient and sister Nayla that [facility] accepted for skilled care and they are agreeable.  [facility] updated in [facility] and reserved.  CM coordinated for patient to recieve walker as recommended by PT.  Walker delivered to patient's room, delivery ticket signed and original placed in DME mailbox and the patietn was given a copy.  Patient's sister nayla confirmed she is the primary contact, POA and will be staying with the patient.  He will be evaluated for increased Waiver services this Tuesday- 5/28.  CM contacted family/caregiver?: Yes  Were Treatment Team discharge recommendations reviewed with patient/caregiver?: Yes  Did patient/caregiver verbalize understanding of patient care needs?: Yes  Were patient/caregiver advised of the risks associated with not following Treatment Team discharge recommendations?: Yes    Contacts  Patient Contacts: Nayla  Relationship to Patient:: Family  Contact Method: Phone  Phone Number: [phone]  Reason/Outcome:  Discharge Planning    Requested Home Health Care         Is the patient interested in HHC at discharge?: Yes  Home Health Discipline requested:: Nursing, Occupational Therapy, Physical Therapy  Home Health Agency Name::  ([facility])  HHA External Referral Reason (only applicable if external HHA name selected): Scheduling access issues  Home Health Follow-Up Provider:: PCP  Home Health Services Needed:: Evaluate Functional Status and Safety, Gait/ADL Training, Strengthening/Theraputic Exercises to Improve Function  Homebound Criteria Met:: Requires the Assistance of Another Person for Safe Ambulation or to Leave the Home, Uses an Assist Device (i.e. cane, walker, etc)  Supporting Clincal Findings:: Fatigues Easliy in Short Distances, Limited Endurance    DME Referral Provided  Referral made for DME?: Yes  DME referral completed for the following items:: Walker  DME Supplier Name:: [employer]    Other Referral/Resources/Interventions Provided:  Referral Comments: Walker delivered to patient's room today and [facility] reserved in [facility].    Would you like to participate in our [facility] service program?  : No - Declined    Treatment Team Recommendation: Short Term Rehab, Acute Rehab  Discharge Destination Plan:: Home with Home Health Care  Transport at Discharge : Family              IMM Given (Date):: 05/24/24  IMM Given to:: Patient  Family notified:: Reviwed with Sister via phone on speaker from patient's room.  She verbalized understanding with no questions or intent to appeal d/c .  Patient signed the original- placed in MR and he was given a copy.

## 2024-05-24 NOTE — PROGRESS NOTES
Patient:  RENATO NIELSEN    MRN:  [id number]    [facility] Request ID:  [id number]    Level of care reserved:  Home Health Agency    Partner Reserved:  [place] [id number] [phone]    Clinical needs requested:    Geography searched:  [id number]    Start of Service:    Request sent:  5:24pm EDT on 5/23/2024 by Dot Adames    Partner reserved:  5:50pm EDT on 5/24/2024 by Dot Adames    Choice list shared:  10:51am EDT on 5/24/2024 by Dot Adames

## 2024-05-24 NOTE — PROGRESS NOTES
[facility]  Progress Note  Name: Tom Pisano I  MRN: [id number]  Unit/Bed#: -01 I Date of Admission: 5/18/2024   Date of Service: 5/24/2024 I Hospital Day: 6    Assessment & Plan   * Liver lesion  Assessment & Plan  Multiple hepatic metastases, developing since 11/30/2023. Retroperitoneal, peripancreatic and vinicius hepatis lymphadenopathy, also new since 11/30/2023.Site of primary malignancy unknown although there is subtle suggestion of a 0.7 x 1.1 cm mass at the junction of the pancreatic head and uncinate process. This can be better evaluated with MRI of the abdomen without and with IV contrast, with MRCP  MRI ordered but unclear if patients endovascular prosthesis is compatible with it - apparently this was done by Myron Cho MD in [facility]     GI consulted, appreciate recommendations -status post EGD and colonoscopy, EGD does reveal an esophageal mass which could be primary.  Biopsies taken, pending.  Oncology consulted -input appreciated, they do recommend outpatient follow-up and further decision in regards to treatment will be made once biopsy results back    Anticipate discharge tomorrow if patient symptomatically doing well, tolerating Eliquis, blood cultures remain negative.      Severe sepsis (HCC)  Assessment & Plan  As evidenced by tachycardia and leukocytosis, Chest CT negative for acute infectious etiology. Family reports diarrhea, patient has had no BM since admission.   Patient received IV ceftriaxone  S/p 30 cc/kg Iv fluids, continue with IV fluids  1/2 blood cultures + gram positive cocci in chains    Given positive Bcx and elevated procal will keep on high dose ceftriaxone for now at least until repeat Bcx done - collected 5/22 and I expect to follow these results until finalized -so far negative    Positive blood culture  Assessment & Plan  1/2 blood culture + gram positive cocci, unclear if this is true but given concern for severe sepsis on  admission and elevated procalcitonin will continue antibiotic    Repeat cultures so far negative.  Monitor      Atrial fibrillation (HCC)  Assessment & Plan  Hx of a fib on eliquis and amiodarone daily  EKG with NSR and PAC  Given that blood pressure is stable    Continue PO amiodarone  Continue Eliquis    Chronic systolic heart failure (HCC)  Assessment & Plan  Wt Readings from Last 3 Encounters:   05/23/24 84.5 kg (186 lb 4.6 oz)   12/04/23 83.5 kg (184 lb)   08/29/23 87.1 kg (192 lb)     Holding home dose Lasix and antihypertensive regimen in setting of hypotension  Hold off on additional IV fluids for now, appears euvolemic  Monitor intake and output closely            Hypertension  Assessment & Plan  Holding home dose Lasix 40 mg daily and Coreg 25 mg twice daily entreso in setting of hypotension as above    Status post femorofemoral bypass surgery  Assessment & Plan  Follows with vascular surgery outpatient, CT showing Aortobifemoral stent graft present with probable complete occlusion of the left iliac limb and probable occlusion of the femoral-femoral bypass graft. Per chart review and family, this is known. Peripheral pulses present.   Patient denies claudication  Overall stable  OP vascular follow up           VTE Pharmacologic Prophylaxis:   Pharmacologic: Apixaban (Eliquis)  Mechanical VTE Prophylaxis in Place: Yes    Patient Centered Rounds: I have performed bedside rounds with nursing staff today.    Discussions with Specialists or Other Care Team Provider: Discussed with care management team    Education and Discussions with Family / Patient: Patient, family previously made aware of ttx plan    Time Spent for Care: 1 hour.  More than 50% of total time spent on counseling and coordination of care as described above.    Current Length of Stay: 6 day(s)    Current Patient Status: Inpatient   Certification Statement: The patient will continue to require additional inpatient hospital stay due to need for  monitoring of dietary tolerance, physical status    Discharge Plan: Hopefully 24h    Code Status: Level 1 - Full Code      Subjective:     Patient valuated this morning.  Does mention some nausea, abdominal pain under control.  So far tolerating Eliquis    Objective:     Vitals:   Temp (24hrs), Av.6 °F (36.4 °C), Min:97.4 °F (36.3 °C), Max:97.7 °F (36.5 °C)    Temp:  [97.4 °F (36.3 °C)-97.7 °F (36.5 °C)] 97.7 °F (36.5 °C)  HR:  [] 77  Resp:  [18] 18  BP: (105-122)/(66-80) 114/72  SpO2:  [91 %-96 %] 91 %  Body mass index is 25.27 kg/m².     Input and Output Summary (last 24 hours):       Intake/Output Summary (Last 24 hours) at 2024 1000  Last data filed at 2024 0721  Gross per 24 hour   Intake 780 ml   Output --   Net 780 ml       Physical Exam:     Physical Exam  Vitals and nursing note reviewed.   Constitutional:       General: He is not in acute distress.     Appearance: Normal appearance. He is not ill-appearing, toxic-appearing or diaphoretic.      Comments: Male patient in bed, awake   HENT:      Head: Normocephalic and atraumatic.      Right Ear: External ear normal.      Left Ear: External ear normal.      Nose: Nose normal. No congestion or rhinorrhea.      Mouth/Throat:      Mouth: Mucous membranes are moist.      Pharynx: Oropharynx is clear. No oropharyngeal exudate or posterior oropharyngeal erythema.   Eyes:      General: No scleral icterus.        Right eye: No discharge.         Left eye: No discharge.      Pupils: Pupils are equal, round, and reactive to light.   Neck:      Vascular: No carotid bruit.   Cardiovascular:      Rate and Rhythm: Normal rate and regular rhythm.      Pulses: Normal pulses.      Heart sounds: No murmur heard.     No friction rub. No gallop.   Pulmonary:      Effort: Pulmonary effort is normal. No respiratory distress.      Breath sounds: Normal breath sounds. No stridor. No wheezing, rhonchi or rales.   Abdominal:      General: Abdomen is flat. Bowel  sounds are normal. There is no distension.      Palpations: Abdomen is soft. There is no mass.      Tenderness: There is no abdominal tenderness. There is no guarding or rebound.      Hernia: No hernia is present.   Musculoskeletal:         General: No swelling, tenderness, deformity or signs of injury. Normal range of motion.      Cervical back: Normal range of motion. No rigidity. No muscular tenderness.   Lymphadenopathy:      Cervical: No cervical adenopathy.   Skin:     General: Skin is warm and dry.      Capillary Refill: Capillary refill takes less than 2 seconds.      Coloration: Skin is not jaundiced or pale.      Findings: No bruising or erythema.   Neurological:      General: No focal deficit present.      Mental Status: He is alert and oriented to person, place, and time. Mental status is at baseline.      Cranial Nerves: No cranial nerve deficit.      Sensory: No sensory deficit.      Motor: No weakness.      Coordination: Coordination normal.      Deep Tendon Reflexes: Reflexes normal.   Psychiatric:         Mood and Affect: Mood normal.         Behavior: Behavior normal.         Thought Content: Thought content normal.         Judgment: Judgment normal.         Additional Data:     Labs:    Results from last 7 days   Lab Units 05/24/24  0527   WBC Thousand/uL 6.38   HEMOGLOBIN g/dL 11.6*   HEMATOCRIT % 36.6   PLATELETS Thousands/uL 143*   SEGS PCT % 72   LYMPHO PCT % 13*   MONO PCT % 14*   EOS PCT % 0     Results from last 7 days   Lab Units 05/24/24  0527 05/23/24  0629   SODIUM mmol/L 135 133*   POTASSIUM mmol/L 3.4* 3.7   CHLORIDE mmol/L 104 103   CO2 mmol/L 23 21   BUN mg/dL 11 13   CREATININE mg/dL 0.69 0.78   ANION GAP mmol/L 8 9   CALCIUM mg/dL 7.5* 7.6*   ALBUMIN g/dL  --  2.6*   TOTAL BILIRUBIN mg/dL  --  0.58   ALK PHOS U/L  --  279*   ALT U/L  --  20   AST U/L  --  91*   GLUCOSE RANDOM mg/dL 85 84     Results from last 7 days   Lab Units 05/22/24  0357   INR  1.46*     Results from last 7  days   Lab Units 05/18/24  0941   POC GLUCOSE mg/dl 108         Results from last 7 days   Lab Units 05/18/24  0954   LACTIC ACID mmol/L 1.9   PROCALCITONIN ng/ml 1.26*           * I Have Reviewed All Lab Data Listed Above.  * Additional Pertinent Lab Tests Reviewed: All Labs For Current Hospital Admission Reviewed      Recent Cultures (last 7 days):     Results from last 7 days   Lab Units 05/22/24  0416 05/18/24  0955 05/18/24  0954   BLOOD CULTURE  No Growth at 48 hrs.  No Growth at 48 hrs. No Growth After 5 Days. Streptococcus agalactiae (Group B)*   GRAM STAIN RESULT   --   --  Gram positive cocci in chains*       Last 24 Hours Medication List:   Current Facility-Administered Medications   Medication Dose Route Frequency Provider Last Rate    acetaminophen  650 mg Oral Q6H PRN Marge Echo Saraiya, DO      amiodarone  200 mg Oral Daily With Breakfast Marge Echo Saraiya, DO      apixaban  5 mg Oral BID Toby Paz MD      atorvastatin  80 mg Oral Daily With Dinner Marge Echo Saraiya, DO      cefTRIAXone  2,000 mg Intravenous Q24H Toby Paz MD 2,000 mg (05/24/24 0550)    mirtazapine  30 mg Oral HS Marge Echo Saraiya, DO      pantoprazole  40 mg Oral Early Morning Marge Echo Saraiya, DO      temazepam  15 mg Oral HS PRN Marge Echo Saraiya, DO          Today, Patient Was Seen By: Toby Paz MD    ** Please Note: Dictation voice to text software may have been used in the creation of this document. **

## 2024-05-24 NOTE — ASSESSMENT & PLAN NOTE
Multiple hepatic metastases, developing since 11/30/2023. Retroperitoneal, peripancreatic and vinicius hepatis lymphadenopathy, also new since 11/30/2023.Site of primary malignancy unknown although there is subtle suggestion of a 0.7 x 1.1 cm mass at the junction of the pancreatic head and uncinate process. This can be better evaluated with MRI of the abdomen without and with IV contrast, with MRCP  MRI ordered but unclear if patients endovascular prosthesis is compatible with it - apparently this was done by Myron Cho MD in [facility]     GI consulted, appreciate recommendations -status post EGD and colonoscopy, EGD does reveal an esophageal mass which could be primary.  Biopsies taken, pending.  Oncology consulted -input appreciated, they do recommend outpatient follow-up and further decision in regards to treatment will be made once biopsy results back    Anticipate discharge tomorrow if patient symptomatically doing well, tolerating Eliquis, blood cultures remain negative.

## 2024-05-25 VITALS
HEART RATE: 94 BPM | SYSTOLIC BLOOD PRESSURE: 138 MMHG | RESPIRATION RATE: 14 BRPM | HEIGHT: 72 IN | WEIGHT: 186.29 LBS | OXYGEN SATURATION: 95 % | TEMPERATURE: 98.4 F | BODY MASS INDEX: 25.23 KG/M2 | DIASTOLIC BLOOD PRESSURE: 89 MMHG

## 2024-05-25 PROCEDURE — 99239 HOSP IP/OBS DSCHRG MGMT >30: CPT | Performed by: INTERNAL MEDICINE

## 2024-05-25 RX ADMIN — APIXABAN 5 MG: 5 TABLET, FILM COATED ORAL at 08:41

## 2024-05-25 RX ADMIN — AMIODARONE HYDROCHLORIDE 200 MG: 200 TABLET ORAL at 08:41

## 2024-05-25 RX ADMIN — CEFTRIAXONE SODIUM 2000 MG: 10 INJECTION, POWDER, FOR SOLUTION INTRAVENOUS at 05:44

## 2024-05-25 RX ADMIN — PANTOPRAZOLE SODIUM 40 MG: 40 TABLET, DELAYED RELEASE ORAL at 05:41

## 2024-05-25 NOTE — DISCHARGE SUMMARY
[facility]  Discharge- Tom Pisano 1955, 69 y.o. male MRN: [id number]  Unit/Bed#: MS Jang01 Encounter: [id number]  Primary Care Provider: Benedicto Mello MD   Date and time admitted to hospital: 5/18/2024  9:38 AM    Discharge Diagnosis:    Liver lesion likely metastatic, potential primary gastric or esophageal cancer, outpatient follow-up with oncology pending  Atrial fibrillation  SIRS, resolved, sepsis ruled out given lack of source  Dementia  Atrial fibrillation  Chronic systolic heart failure  Hypertension  Status post femorofemoral bypass      Discharging Physician / Practitioner: Toby Paz MD  PCP: Benedicto Mello MD  Admission Date:   Admission Orders (From admission, onward)       Ordered        05/18/24 1254  INPATIENT ADMISSION  Once                          Discharge Date: 05/25/24          Consultations During Hospital Stay:  Gastroenterology  Oncology    Procedures Performed:    [facility]  [address] [address]  [phone]        DATE OF SERVICE:  5/22/24     PHYSICIAN(S):  Attending:   Bradford Tam DO      Fellow:   No Staff Documented         INDICATION:  Abnormal abdominal CT scan, Liver lesion     POST-OP DIAGNOSIS:  See the impression below.     PREPROCEDURE:  Informed consent was obtained for the procedure, including sedation.  Risks of perforation, hemorrhage, adverse drug reaction and aspiration were discussed. The patient was placed in the left lateral decubitus position.     Patient was explained about the risks and benefits of the procedure. Risks including but not limited to bleeding, infection, and perforation were explained in detail. Also explained about less than 100% sensitivity with the exam and other alternatives.     PROCEDURE: EGD     DETAILS OF PROCEDURE:   Patient was taken to the procedure room where a time out was performed to confirm correct patient and correct procedure.  The patient underwent monitored anesthesia care, which was administered by an anesthesia professional. The patient's blood pressure, heart rate, level of consciousness, respirations, oxygen, ECG and ETCO2 were monitored throughout the procedure. The scope was introduced through the mouth and advanced to the second part of the duodenum. Retroflexion was performed in the fundus. The patient's estimated blood loss was minimal (<5 mL). The procedure was not difficult. The patient tolerated the procedure well. There were no apparent adverse events.      ANESTHESIA INFORMATION:  ASA: ASA status not filed in the log.  Anesthesia Type: Anesthesia type not filed in the log.     MEDICATIONS:  No administrations occurring from 1512 to 1528 on 05/22/24         FINDINGS:  The cricopharynx, upper third of the esophagus and middle third of the esophagus appeared normal. Z-line is 38 cm from the incisors.  Malignant-appearing, fungating and ulcerated mass (traversable) in the lower third of the esophagus (35 cm from the incisors), covering the whole circumference; bleeding occurred after intervention; performed cold forceps biopsy  Malignant-appearing and fungating mass (traversable) in the cardia and fundus of the stomach, covering one third of the circumference; bleeding occurred before intervention; performed cold forceps biopsy  The body of the stomach, incisura, antrum, prepyloric region and pylorus appeared normal.  The duodenal bulb and 2nd part of the duodenum appeared normal.        SPECIMENS:  ID Type Source Tests Collected by Time Destination   1 : stomach mass Tissue Stomach TISSUE EXAM Bradford Tam,  5/22/2024  3:26 PM     2 : ESOPHAGEAL mass Tissue Esophagus TISSUE EXAM Bradford PadillaDO vicky 5/22/2024  3:27 PM              IMPRESSION:  The cricopharynx, upper third of the esophagus and middle third of the esophagus appeared normal.  Malignant-appearing, fungating and ulcerated mass in the lower third of the  esophagus, covering the whole circumference; bleeding occurred after intervention; performed cold forceps biopsy  Malignant-appearing and fungating mass in the cardia and fundus of the stomach, covering one third of the circumference; bleeding occurred before intervention; performed cold forceps biopsy  The body of the stomach, incisura, antrum, prepyloric region and pylorus appeared normal.  The duodenal bulb and 2nd part of the duodenum appeared normal.        RECOMMENDATION:  1.  I have sent the Specimens to the pathology section with a stat request  2.  No indication for percutaneous biopsy of the liver    Significant Findings / Test Results:   Procedure Component Value Units Date/Time   CT head without contrast [[id number]] Collected: 05/18/24 1143   Order Status: Completed Updated: 05/18/24 1147   Narrative:     CT BRAIN - WITHOUT CONTRAST    INDICATION:   worsening mental status (h/o dementia). 69-year-old male.    COMPARISON: CT from November 30, 2023.    TECHNIQUE:  CT examination of the brain was performed.  Multiplanar 2D reformatted images were created from the source data.    Radiation dose length product (DLP) for this visit:  882 mGy-cm .  This examination, like all CT scans performed in the [facility], was performed utilizing techniques to minimize radiation dose exposure, including the use of iterative  reconstruction and automated exposure control.    IMAGE QUALITY:  Diagnostic.    FINDINGS:    PARENCHYMA:  No intracranial mass, mass effect or midline shift. No CT signs of acute infarction.  No acute parenchymal hemorrhage. Decreased white matter attenuation, most consistent with chronic microangiopathic changes.    VENTRICLES AND EXTRA-AXIAL SPACES: Ventricles and extra-axial CSF spaces are prominent commensurate with the degree of volume loss.  No hydrocephalus.  No acute extra-axial hemorrhage.    VISUALIZED ORBITS: Normal visualized orbits.    PARANASAL SINUSES: Normal  visualized paranasal sinuses.    CALVARIUM AND EXTRACRANIAL SOFT TISSUES:  Normal.   Impression:       No acute intracranial abnormality.            Workstation performed: [id number]   CT chest abdomen pelvis w contrast [[id number]] Collected: 05/18/24 1145   Order Status: Completed Updated: 05/18/24 1247   Narrative:     CT CHEST, ABDOMEN AND PELVIS WITH IV CONTRAST    INDICATION: hypotension, hypothermic, concern for infx, diarrhea with blood today. 69-year-old male.    COMPARISON: CT chest abdomen pelvis from November 30, 2023.    TECHNIQUE: CT examination of the chest, abdomen and pelvis was performed. Multiplanar 2D reformatted images were created from the source data.    This examination, like all CT scans performed in the [facility], was performed utilizing techniques to minimize radiation dose exposure, including the use of iterative reconstruction and automated exposure control. Radiation dose length  product (DLP) for this visit: 724 mGy-cm    IV Contrast: 100 mL of iohexol (OMNIPAQUE)  Enteric Contrast: Not administered.    FINDINGS:    CHEST    LUNGS: Advanced emphysema, most severe in the upper lobes with large left apical bulla, unchanged since 11/30/2023. Postoperative changes in the left upper lobe. Resolution of bilateral lower lobe consolidation since the CT from 11/30/2023. Subsegmental  atelectasis in the bases of both lower lobes. No mass, consolidation or suspicious nodule.    PLEURA: Unremarkable.    HEART/GREAT VESSELS: Coronary artery calcifications. Mild cardiomegaly. Heart otherwise unremarkable. ICD present.. Fusiform ectasia of the ascending thoracic aorta measuring up to 4.1 cm. Recommendation is for follow-up low radiation dose chest CT in  one year. Mild dilatation of the central pulmonary arteries with main pulmonary artery diameter of 3.2 cm.    MEDIASTINUM AND MAVIS: Several prominent mediastinal lymph nodes with normal morphology, the largest a subcarinal node with  a short axis diameter of 1.4 cm these lymph nodes have decreased in size since 11/30/2030. No new lymphadenopathy or mass. Moderate   size hiatal hernia. Esophagus unremarkable. Trachea and main stem bronchi unremarkable.    CHEST WALL AND LOWER NECK: Unremarkable.    ABDOMEN    LIVER/BILIARY TREE: Liver enlarged, 18.5 cm in length, increased in size since 11/30/2023. Interval development of numerous hypoenhancing masses throughout the liver, the largest including: 3.0 x 5.5 cm, segment 2; 3.0 x 4.3 cm; segments 2/4A; 2.7 x 4.1  cm mass, segment 5;; 2.5 x 3.2 cm, segment 6; 2.5 x 3.4 cm, segment 8. Bile ducts normal in caliber.    GALLBLADDER: No calcified gallstones. No pericholecystic inflammatory change.    SPLEEN: Unremarkable.    PANCREAS: Several enlarged low-attenuation peripancreatic lymph nodes but no convincing evidence of intrapancreatic tumor questionable 0.7 x 1.1 cm hypoenhancing structure at the junction of the pancreatic head and uncinate process (series 2, image 145).    ADRENAL GLANDS: Unremarkable.    KIDNEYS/URETERS: 1.4 x 1.6 cm fluid attenuation structure in the lateral mid left kidney, most likely a simple cortical cyst. No suspicious renal masses. Cortical scarring in the lower pole of the left kidney. No calculus or hydronephrosis.    STOMACH AND BOWEL: Gastric fundus located within a hiatal hernia. Stomach otherwise unremarkable. Small intestine unremarkable. Diffuse colonic diverticulosis without evidence of diverticulitis. Colon otherwise unremarkable.    APPENDIX: Normal.    ABDOMINOPELVIC CAVITY: Multiple enlarged lymph nodes developing since 11/30/2023. These include a 1.5 x 3.7 cm portacaval node, several vinicius hepatis nodes, the largest measuring 1.3 x 1.8 cm and 1.1 x 1.6 cm, a 1.9 x 2.2 cm node superior to the proximal   pancreatic body, 1.3 x 2.0 cm left para-aortic node at the level of the origin of the SMA, an additional 1.0 x 1.5 cm node adjacent to the origin of the SMA. Trace  amount of pelvic ascites. No extraluminal gas.        VESSELS: Extensive aortic and iliac atherosclerosis. Aortoiliac stent grafts present. Stented left common iliac artery appears to be occluded. Femorofemoral bypass graft present, but also unenhanced and likely occluded. Splenic, portal and superior  mesenteric veins patent. Splenic artery and SMA patent.    PELVIS    REPRODUCTIVE ORGANS: Enlarged prostate.    URINARY BLADDER: Unremarkable.    ABDOMINAL WALL/INGUINAL REGIONS: Unremarkable.    BONES: Deformity of the right clavicle, consistent with old healed, healed fracture. No acute fracture or destructive lesion.   Impression:       1.  Multiple hepatic metastases, developing since 11/30/2023.    2.  Retroperitoneal, peripancreatic and vinicius hepatis lymphadenopathy, also new since 11/30/2023.    3.  Site of primary malignancy unknown although there is subtle suggestion of a 0.7 x 1.1 cm mass at the junction of the pancreatic head and uncinate process. This can be better evaluated with MRI of the abdomen without and with IV contrast, with MRCP.    4.  Aortobifemoral stent graft present with probable complete occlusion of the left iliac limb and probable occlusion of the femoral-femoral bypass graft.    5.  Severe emphysema.    6.  Other than subsegmental atelectasis in the bases of the both lower lobes, no evidence of acute abnormality in the chest.      I personally discussed this study with MAUREEN AMARO on 5/18/2024 12:27 PM.          Workstation performed: [id number]   XR chest 1 view portable [[id number]] Collected: 05/18/24 1258   Order Status: Completed Updated: 05/18/24 1301   Narrative:     XR CHEST PORTABLE    INDICATION: hypotension.    COMPARISON: Chest CT 5/18/2024, CXR 11/30/2023.    FINDINGS:    Emphysema with no acute disease. Surgical changes in the left lung. No pneumothorax or pleural effusion.    Heart upper limit of normal in size with left subclavian ICD leads in right atrium and  right ventricle.    Bones are unremarkable for age. Old right clavicle fracture.    Normal upper abdomen. Abdominal aortic stent graft.   Impression:       No acute cardiopulmonary disease.        Pathology   Final Diagnosis   A. Stomach, Stomach mass, Biopsy:  - Poorly differentiated carcinoma.      B. 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  - Alcian blue/PAS special stain confirms the presence of goblet cells.      Comment: Given the clinical impression of a mass, and the superficial nature of the tissue sample, the biopsy may not be representative of the entire clinically significant lesion.  Clinical correlation is suggested. Re-biopsy may be considered to exclude an unsampled higher grade lesion.     C. Colon, Proximal ascending x4, Polypectomy:  - Fragments of tubular adenoma(s).  - Negative for high grade dysplasia.      D. Colon, Proximal transverse x2, Polypectomy:  - Fragments of tubular adenoma(s).  - Negative for high grade dysplasia.      E. Colon, Mid transverse x3, Polypectomy:  - Fragments of tubular adenoma(s).  - Negative for high grade dysplasia.      F. Colon, Mid signmoid x3, Polypectomy:  - Tubular adenoma.  - Inflammatory polyp.  - Negative for high grade dysplasia.    Electronically signed by lIene Armanod MD on 5/24/2024 at 1047   Microscopic Description    Immunochemical stains performed on block A1 with appropriate controls show the tumor cells are positive for CK-AE1/3, CAM5.2, CDX-2 (focal), SATB2 (focal), synaptophysin (rare), chromogranin (very rare), CD56 (rare) with increased Ki67 proliferation index up to 90%, negative for CK7 and CK20. Alcian blue/PAS special stain confirms the absence of goblet cells.      Immunochemical stains performed on block B1 with appropriate controls show the lesion is positive for CK-AE1/3 with increased Ki67 proliferation index. p53 shows mutant type expression.   Note    Dr. Tam notified electronically  ([email or web address]) by Dr. Armando on 5/24/2024 at 10:20.          Outpatient follow up Requested:  PCP  Oncology    Complications: None    Reason for Admission: Weakness    HPI:  Tom Pisano is a 69 y.o. male with a PMH of CHF, DVT, hypertension, dementia who presents with 4-day history of weakness, worsening loose watery brown diarrhea.  Notes some mild blood.  Denies nausea, vomiting.  Unintentional weight loss over the past couple weeks to months.  Poor p.o. intake.  Per family noted to be slightly confused.  Patient noted to be hypotensive on EMS in ED responding to IV fluids.  Patient admitted for severe sepsis possibly due to viral gastroenteritis, ANNETTA.     Hospital Course:     The patient was hospitalized.  His loose bowel movements resolved without any significant intervention.  He did undergo further investigation with EGD and colonoscopy given his findings in the liver concerning for metastasis, the EGD did reveal some findings in the esophagus and stomach compatible with potential cancer.  Biopsies taken.  The patient was seen by oncology and recommended outpatient follow-up.  As far as his initial concern of sepsis the patient did not have a primary source.  He did receive ceftriaxone initially given the fact that 1/2 Bcx came back positive to Streptococcus unlikely but repeat cultures were essentially negative, patient is nontoxic, no further need for antibiotic therapy.  The patient is stable and tolerating p.o. intake and wishes to go home.  Patient be discharged in stable condition.  Pathology concerning for poorly differentiated carcinoma.  He should follow-up with oncology in the outpatient setting for further determination of further treatment for his potential cancer.  Patient verbalized understanding as well as sister.  Patient discharged in stable condition.    Condition at Discharge: good     Discharge Day Visit / Exam:     Subjective:    Patient valuated this morning.  He wishes to go  home.  Denies any chest pain nausea or vomiting.    Vitals: Blood Pressure: 138/89 (05/25/24 0750)  Pulse: 94 (05/25/24 0750)  Temperature: 98.4 °F (36.9 °C) (05/25/24 0750)  Temp Source: Temporal (05/23/24 0103)  Respirations: 14 (05/25/24 0750)  Height: 6' (182.9 cm) (05/23/24 0103)  Weight - Scale: 84.5 kg (186 lb 4.6 oz) (05/23/24 0103)  SpO2: 95 % (05/25/24 0750)    Exam:   Physical Exam  Vitals and nursing note reviewed.   Constitutional:       Appearance: Normal appearance.      Comments: Male patient in bed, awake   HENT:      Head: Normocephalic and atraumatic.      Right Ear: External ear normal.      Left Ear: External ear normal.      Nose: Nose normal. No congestion or rhinorrhea.      Mouth/Throat:      Mouth: Mucous membranes are moist.      Pharynx: Oropharynx is clear. No oropharyngeal exudate or posterior oropharyngeal erythema.   Eyes:      General: No scleral icterus.        Right eye: No discharge.         Left eye: No discharge.      Pupils: Pupils are equal, round, and reactive to light.   Neck:      Vascular: No carotid bruit.   Cardiovascular:      Rate and Rhythm: Normal rate and regular rhythm.      Pulses: Normal pulses.      Heart sounds: No murmur heard.     No friction rub. No gallop.   Pulmonary:      Effort: Pulmonary effort is normal. No respiratory distress.      Breath sounds: Normal breath sounds. No stridor. No wheezing, rhonchi or rales.   Abdominal:      General: Abdomen is flat. Bowel sounds are normal. There is no distension.      Palpations: Abdomen is soft. There is no mass.      Tenderness: There is no abdominal tenderness. There is no guarding or rebound.      Hernia: No hernia is present.   Musculoskeletal:         General: No swelling, tenderness, deformity or signs of injury. Normal range of motion.      Cervical back: Normal range of motion. No rigidity. No muscular tenderness.   Lymphadenopathy:      Cervical: No cervical adenopathy.   Skin:     General: Skin is  warm and dry.      Capillary Refill: Capillary refill takes less than 2 seconds.      Coloration: Skin is not jaundiced or pale.      Findings: No bruising or erythema.   Neurological:      General: No focal deficit present.      Mental Status: He is alert and oriented to person, place, and time. Mental status is at baseline.      Cranial Nerves: No cranial nerve deficit.      Sensory: No sensory deficit.      Motor: No weakness.      Coordination: Coordination normal.      Deep Tendon Reflexes: Reflexes normal.   Psychiatric:         Mood and Affect: Mood normal.         Behavior: Behavior normal.         Thought Content: Thought content normal.         Judgment: Judgment normal.           Discussion with Family:   Discussed with sister over the phone    Discharge instructions/Information to patient and family:   See after visit summary for information provided to patient and family.      Provisions for Follow-Up Care:  See after visit summary for information related to follow-up care and any pertinent home health orders.      Disposition:     Home    For Discharges to [facility]F:   Not Applicable to this Patient - Not Applicable to this Patient    Planned Readmission: No     Discharge Statement:  I spent 90 minutes discharging the patient. This time was spent on the day of discharge. I had direct contact with the patient on the day of discharge. Greater than 50% of the total time was spent examining patient, answering all patient questions, arranging and discussing plan of care with patient as well as directly providing post-discharge instructions.  Additional time then spent on discharge activities.    Discharge Medications:  See after visit summary for reconciled discharge medications provided to patient and family.      ** Please Note: This note has been constructed using a voice recognition system **

## 2024-05-25 NOTE — PLAN OF CARE
Problem: CARDIOVASCULAR - ADULT  Goal: Maintains optimal cardiac output and hemodynamic stability  Description: INTERVENTIONS:  - Monitor I/O, vital signs and rhythm  - Monitor for S/S and trends of decreased cardiac output  - Administer and titrate ordered vasoactive medications to optimize hemodynamic stability  - Assess quality of pulses, skin color and temperature  - Assess for signs of decreased coronary artery perfusion  - Instruct patient to report change in severity of symptoms  Outcome: Progressing  Goal: Absence of cardiac dysrhythmias or at baseline rhythm  Description: INTERVENTIONS:  - Continuous cardiac monitoring, vital signs, obtain 12 lead EKG if ordered  - Administer antiarrhythmic and heart rate control medications as ordered  - Monitor electrolytes and administer replacement therapy as ordered  Outcome: Progressing     Problem: METABOLIC, FLUID AND ELECTROLYTES - ADULT  Goal: Electrolytes maintained within normal limits  Description: INTERVENTIONS:  - Monitor labs and assess patient for signs and symptoms of electrolyte imbalances  - Administer electrolyte replacement as ordered  - Monitor response to electrolyte replacements, including repeat lab results as appropriate  - Instruct patient on fluid and nutrition as appropriate  Outcome: Progressing  Goal: Fluid balance maintained  Description: INTERVENTIONS:  - Monitor labs   - Monitor I/O and WT  - Instruct patient on fluid and nutrition as appropriate  - Assess for signs & symptoms of volume excess or deficit  Outcome: Progressing     Problem: PAIN - ADULT  Goal: Verbalizes/displays adequate comfort level or baseline comfort level  Description: Interventions:  - Encourage patient to monitor pain and request assistance  - Assess pain using appropriate pain scale  - Administer analgesics based on type and severity of pain and evaluate response  - Implement non-pharmacological measures as appropriate and evaluate response  - Consider cultural  and social influences on pain and pain management  - Notify physician/advanced practitioner if interventions unsuccessful or patient reports new pain  Outcome: Progressing     Problem: INFECTION - ADULT  Goal: Absence or prevention of progression during hospitalization  Description: INTERVENTIONS:  - Assess and monitor for signs and symptoms of infection  - Monitor lab/diagnostic results  - Monitor all insertion sites, i.e. indwelling lines, tubes, and drains  - Monitor endotracheal if appropriate and nasal secretions for changes in amount and color  - [place] appropriate cooling/warming therapies per order  - Administer medications as ordered  - Instruct and encourage patient and family to use good hand hygiene technique  - Identify and instruct in appropriate isolation precautions for identified infection/condition  Outcome: Progressing  Goal: Absence of fever/infection during neutropenic period  Description: INTERVENTIONS:  - Monitor WBC    Outcome: Progressing     Problem: SAFETY ADULT  Goal: Patient will remain free of falls  Description: INTERVENTIONS:  - Educate patient/family on patient safety including physical limitations  - Instruct patient to call for assistance with activity   - Consult OT/PT to assist with strengthening/mobility   - Keep Call bell within reach  - Keep bed low and locked with side rails adjusted as appropriate  - Keep care items and personal belongings within reach  - Initiate and maintain comfort rounds  - Make Fall Risk Sign visible to staff  - Offer Toileting every 2 Hours, in advance of need  - Initiate/Maintain bedalarm  - Obtain necessary fall risk management equipment:   - Apply yellow socks and bracelet for high fall risk patients  - Consider moving patient to room near nurses station  Outcome: Progressing  Goal: Maintain or return to baseline ADL function  Description: INTERVENTIONS:  -  Assess patient's ability to carry out ADLs; assess patient's baseline for ADL function  and identify physical deficits which impact ability to perform ADLs (bathing, care of mouth/teeth, toileting, grooming, dressing, etc.)  - Assess/evaluate cause of self-care deficits   - Assess range of motion  - Assess patient's mobility; develop plan if impaired  - Assess patient's need for assistive devices and provide as appropriate  - Encourage maximum independence but intervene and supervise when necessary  - Involve family in performance of ADLs  - Assess for home care needs following discharge   - Consider OT consult to assist with ADL evaluation and planning for discharge  - Provide patient education as appropriate  Outcome: Progressing  Goal: Maintains/Returns to pre admission functional level  Description: INTERVENTIONS:  - Perform AM-PAC 6 Click Basic Mobility/ Daily Activity assessment daily.  - Set and communicate daily mobility goal to care team and patient/family/caregiver.   - Collaborate with rehabilitation services on mobility goals if consulted  - Perform Range of Motion 3 times a day.  - Reposition patient every 2 hours.  - Dangle patient 3 times a day  - Stand patient 3 times a day  - Ambulate patient 3 times a day  - Out of bed to chair 3 times a day   - Out of bed for meals 3 times a day  - Out of bed for toileting  - Record patient progress and toleration of activity level   Outcome: Progressing     Problem: DISCHARGE PLANNING  Goal: Discharge to home or other facility with appropriate resources  Description: INTERVENTIONS:  - Identify barriers to discharge w/patient and caregiver  - Arrange for needed discharge resources and transportation as appropriate  - Identify discharge learning needs (meds, wound care, etc.)  - Arrange for interpretive services to assist at discharge as needed  - Refer to Case Management Department for coordinating discharge planning if the patient needs post-hospital services based on physician/advanced practitioner order or complex needs related to functional  status, cognitive ability, or social support system  Outcome: Progressing     Problem: Knowledge Deficit  Goal: Patient/family/caregiver demonstrates understanding of disease process, treatment plan, medications, and discharge instructions  Description: Complete learning assessment and assess knowledge base.  Interventions:  - Provide teaching at level of understanding  - Provide teaching via preferred learning methods  Outcome: Progressing     Problem: Prexisting or High Potential for Compromised Skin Integrity  Goal: Skin integrity is maintained or improved  Description: INTERVENTIONS:  - Identify patients at risk for skin breakdown  - Assess and monitor skin integrity  - Assess and monitor nutrition and hydration status  - Monitor labs   - Assess for incontinence   - Turn and reposition patient  - Assist with mobility/ambulation  - Relieve pressure over bony prominences  - Avoid friction and shearing  - Provide appropriate hygiene as needed including keeping skin clean and dry  - Evaluate need for skin moisturizer/barrier cream  - Collaborate with interdisciplinary team   - Patient/family teaching  - Consider wound care consult   Outcome: Progressing     Problem: Nutrition/Hydration-ADULT  Goal: Nutrient/Hydration intake appropriate for improving, restoring or maintaining nutritional needs  Description: Monitor and assess patient's nutrition/hydration status for malnutrition. Collaborate with interdisciplinary team and initiate plan and interventions as ordered.  Monitor patient's weight and dietary intake as ordered or per policy. Utilize nutrition screening tool and intervene as necessary. Determine patient's food preferences and provide high-protein, high-caloric foods as appropriate.     INTERVENTIONS:  - Monitor oral intake, urinary output, labs, and treatment plans  - Assess nutrition and hydration status and recommend course of action  - Evaluate amount of meals eaten  - Assist patient with eating if  necessary   - Allow adequate time for meals  - Recommend/ encourage appropriate diets, oral nutritional supplements, and vitamin/mineral supplements  - Order, calculate, and assess calorie counts as needed  - Recommend, monitor, and adjust tube feedings and TPN/PPN based on assessed needs  - Assess need for intravenous fluids  - Provide specific nutrition/hydration education as appropriate  - Include patient/family/caregiver in decisions related to nutrition  Outcome: Progressing

## 2024-05-27 LAB
BACTERIA BLD CULT: NORMAL
BACTERIA BLD CULT: NORMAL

## 2024-05-28 ENCOUNTER — PATIENT OUTREACH (OUTPATIENT)
Dept: HEMATOLOGY ONCOLOGY | Facility: CLINIC | Age: 69
End: 2024-05-28

## 2024-05-28 NOTE — PROGRESS NOTES
Phone outreach to the patient's sister Nayla.  We discussed Tom's recent hospital visit 5/25 for weakness and dehydration and he had a fall in the bathroom yesterday without any reported injury or pain.  She reports Tom denies any pain at present.  She reports patient has a poor appetite and with dementia is difficult to get him to eat.  Some days he does not eat and other days will eat 1-2 meals.  He does have ensure and efforts are made for him to drink 3 daily.  He also has occasional difficulty swallowing and food comes back up.  She mentions that his as home care daily and is looking for help in the evenings.  They are unsure if he wants treatment.  She reports they did not receive the biopsy results and will discuss this with Dr. Montoya tomorrow.  I will follow up with Nayla tomorrow after appointment.  She agreed and thanked me.

## 2024-05-29 ENCOUNTER — OFFICE VISIT (OUTPATIENT)
Dept: HEMATOLOGY ONCOLOGY | Facility: CLINIC | Age: 69
End: 2024-05-29
Payer: COMMERCIAL

## 2024-05-29 VITALS
RESPIRATION RATE: 16 BRPM | WEIGHT: 188 LBS | SYSTOLIC BLOOD PRESSURE: 122 MMHG | TEMPERATURE: 97.3 F | HEART RATE: 81 BPM | HEIGHT: 72 IN | DIASTOLIC BLOOD PRESSURE: 62 MMHG | OXYGEN SATURATION: 96 % | BODY MASS INDEX: 25.47 KG/M2

## 2024-05-29 DIAGNOSIS — I50.43 ACUTE ON CHRONIC COMBINED SYSTOLIC AND DIASTOLIC CONGESTIVE HEART FAILURE (HCC): ICD-10-CM

## 2024-05-29 DIAGNOSIS — C16.0 MALIGNANT NEOPLASM OF CARDIA OF STOMACH (HCC): Primary | ICD-10-CM

## 2024-05-29 DIAGNOSIS — D68.9 COAGULOPATHY (HCC): ICD-10-CM

## 2024-05-29 DIAGNOSIS — I48.11 LONGSTANDING PERSISTENT ATRIAL FIBRILLATION (HCC): ICD-10-CM

## 2024-05-29 DIAGNOSIS — C22.8 LIVER CANCER, PRIMARY, WITH METASTASIS FROM LIVER TO OTHER SITE (HCC): ICD-10-CM

## 2024-05-29 DIAGNOSIS — I42.9 CARDIOMYOPATHY, UNSPECIFIED TYPE (HCC): ICD-10-CM

## 2024-05-29 DIAGNOSIS — I50.22 CHRONIC SYSTOLIC HEART FAILURE (HCC): Chronic | ICD-10-CM

## 2024-05-29 DIAGNOSIS — E44.0 PROTEIN-CALORIE MALNUTRITION, MODERATE (HCC): ICD-10-CM

## 2024-05-29 PROCEDURE — 99214 OFFICE O/P EST MOD 30 MIN: CPT | Performed by: INTERNAL MEDICINE

## 2024-05-29 PROCEDURE — G2211 COMPLEX E/M VISIT ADD ON: HCPCS | Performed by: INTERNAL MEDICINE

## 2024-05-29 NOTE — PROGRESS NOTES
Hematology/Oncology Outpatient Follow- up Note  Tom Pisano 69 y.o. male MRN: @ Encounter: [id number]        Date:  5/29/2024        Assessment / Plan:    1 stage IV gastric carcinoma  2 liver metastasis  3 dementia  4 atrial fibrillation  5 chronic congestive heart failure  Plan: Patient ideally should be considered for nivolumab FOLFOX.  Will see him in 3 weeks.  Consents were obtained today and a treatment plan was placed.  Jdwzed-m-Jvsm consultation interventional radiology was placed as well.  Prognosis long-term is guarded        HPI: 69-year-old gentleman with a history of diarrhea weakness fatigue slight confusion.  History of mild dementia.  He has a caretaker at home.  He came in was found to have liver metastasis.  Primary site not initially known but then had gastric masses seen on endoscopy biopsy showing poorly differentiated carcinoma.  He is alert he uses a walker to get around again he has dementia some trouble with balance.  He also was noted to have permanent pacemaker atrial fibrillation coronary artery disease.  Ultimately he needs to be considered for further treatment.  Given that is a gastric cancer we would offer him nivolumab/FOLFOX.  Side effects were discussed consent was obtained he will need an Iistwl-y-Dkvr as well.  Interval History: Note from 5/23/2024:  Presenting Complaint: Came with tachycardia and leukocytosis.  Found to have liver lesions and a lesion in the lower esophagus.   History of Presenting Illness: Tom Pisano is seen for initial consultation 5/18/2024 at the referral of hospitalist service.  69-year-old gentleman was seen with onset of diarrhea weakness fatigue slight confusion.  He has underlying history of dementia.  He is pleasant he can speak but is easily confused.  He has a history of peripheral vascular disease permanent pacemaker atrial fibrillation.  He is on blood thinners as well.  He lives alone but has home health people coming in to assist  him with care.  Is a past history state of dementia.  He has coronary artery disease.  He has permanent pacemaker.  He has atrial fibrillation.  He was hospitalized November 2023 with double pneumonia with RSV.  He has lost 10 pounds in the last month.  He is dehydrated was not eating or drinking very well.  He is actually eating and swallowing can swallow solids presently.  He is undergoing workup which shows multiple filling defects in the liver consistent with metastatic disease.  He also has an esophagoscopy a lesion in the bottom one third of the esophagus biopsies of which are pending.  His EGD showed a malignant appearing fungating mass transverse a bowl in the cardia and fundus of the stomach covering one third of the circumference bleeding occurred before intervention cold forceps biopsy was done.  He also had a malignant appearing fungating ulcerated mass in the lower third esophagus 35 cm from the incisors which was also bleeding.  Both were biopsied and biopsies are pending.      Cancer Staging:  Cancer Staging   No matching staging information was found for the patient.      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)     6/18/2024 -  Chemotherapy    alteplase (CATHFLO), 2 mg, Intracatheter, Every 1 Minute as needed, 0 of 12 cycles  fluorouracil (ADRUCIL), 400 mg/m2, Intravenous, Once, 0 of 12 cycles  nivolumab (OPDIVO) IVPB, 240 mg, Intravenous, Once, 0 of 12 cycles  leucovorin calcium IVPB, 400 mg/m2, Intravenous, Once, 0 of 12 cycles  oxaliplatin (ELOXATIN) chemo infusion, 85 mg/m2, Intravenous, Once, 0 of 12 cycles  fluorouracil (ADRUCIL) ambulatory infusion Soln, 1,200 mg/m2/day, Intravenous, Over 46 hours, 0 of 12 cycles         Current Hematologic/ Oncologic Treatment:       Cycle 1         Test Results:    Imaging:  Colonoscopy    Result Date: 5/22/2024  Narrative: Table formatting from the original result was not included.  [facility] [address] [facility] [place] [address] [phone] DATE OF SERVICE: 5/22/24 PHYSICIAN(S): Attending: Bradford Tam DO Fellow: No Staff Documented INDICATION: Abnormal abdominal CT scan, Liver lesion POST-OP DIAGNOSIS: See the impression below. HISTORY: Prior colonoscopy: 3 years ago. BOWEL PREPARATION: Miralax/Dulcolax; Golytely/Colyte/Trilyte PREPROCEDURE: Informed consent was obtained for the procedure, including sedation. Risks including but not limited to bleeding, infection, perforation, adverse drug reaction and aspiration were explained in detail. Also explained about less than 100% sensitivity with the exam and other alternatives. The patient was placed in the left lateral decubitus position. Procedure: Colonoscopy DETAILS OF PROCEDURE: Patient was taken to the procedure room where a time out was performed to confirm correct patient and correct procedure. The patient underwent monitored anesthesia care, which was administered by an anesthesia professional. The patient's blood pressure, heart rate, level of consciousness, oxygen, respirations, ECG and ETCO2 were monitored throughout the procedure. A digital rectal exam was performed. A perianal exam was performed. The scope was introduced through the anus and advanced to the cecum. Photodocumentation was obtained at the ileocecal valve, appendiceal orifice and retroflexed view of the rectum. Retroflexion was performed in the rectum. The quality of bowel preparation was evaluated using the [place] Bowel Preparation Scale with scores of: right colon = 2, transverse colon = 2, left colon = 2. The total BBPS score was 6. Bowel prep was adequate. The patient's estimated blood loss was minimal (<5 mL). The procedure was not difficult. The patient tolerated the procedure well. There were no apparent adverse events.  ANESTHESIA INFORMATION: ASA: III Anesthesia Type: Anesthesia type not filed in the log. MEDICATIONS: No administrations occurring from 1512 to 1602 on 05/22/24 FINDINGS: Diverticula in the mid sigmoid colon and distal sigmoid colon Four sessile polyps measuring smaller than 5 mm in the proximal ascending colon; bleeding occurred after intervention; performed cold snare with complete en bloc removal and retrieved specimen Two sessile polyps measuring smaller than 5 mm in the proximal transverse colon; bleeding occurred after intervention; performed cold snare with complete en bloc removal and retrieved specimen Three sessile polyps measuring smaller than 5 mm in the mid transverse colon; bleeding occurred after intervention; performed cold snare with complete en bloc removal and retrieved specimen Three sessile polyps measuring smaller than 5 mm in the sigmoid colon; bleeding occurred after intervention; performed cold snare with complete en bloc removal and retrieved specimen The cecum, hepatic flexure, splenic flexure, descending colon, rectosigmoid and rectum appeared normal. EVENTS: Procedure Events Event Event Time ENDO CECUM REACHED 5/22/2024  3:40 PM ENDO SCOPE OUT TIME 5/22/2024  4:01 PM SPECIMENS: ID Type Source Tests Collected by Time Destination 1 : stomach mass Tissue Stomach TISSUE EXAM Bradford Tam, DO 5/22/2024  3:26 PM  2 : ESOPHAGEAL mass Tissue Esophagus TISSUE EXAM Bradford Tam, DO 5/22/2024  3:27 PM  3 : proximal ascending x4 Tissue Polyp, Colorectal TISSUE EXAM Bradford Tam, DO 5/22/2024  3:50 PM  4 : proximal transversex 2 Tissue Polyp, Colorectal TISSUE EXAM Bradford Tam, DO 5/22/2024  3:50 PM  5 : mid transverse x3 Tissue Polyp, Colorectal TISSUE EXAM Bradford Tam, DO 5/22/2024  3:51 PM  6 : mid signmoid x3 Tissue Polyp, Colorectal TISSUE EXAM Bradford Tam, DO 5/22/2024  3:58 PM  EQUIPMENT: Colonoscope -[id number] ENDOCUFF VISION LRG GREEN ID [id number]     Impression:  Diverticulosis in the mid sigmoid colon and distal sigmoid colon Four polyps measuring smaller than 5 mm in the proximal ascending colon; bleeding occurred after intervention; performed cold snare removal Two polyps measuring smaller than 5 mm in the proximal transverse colon; bleeding occurred after intervention; performed cold snare removal Three polyps measuring smaller than 5 mm in the mid transverse colon; bleeding occurred after intervention; performed cold snare removal Three polyps measuring smaller than 5 mm in the sigmoid colon; bleeding occurred after intervention; performed cold snare removal The cecum, hepatic flexure, splenic flexure, descending colon, rectosigmoid and rectum appeared normal. RECOMMENDATION: Repeat colonoscopy in 3 years, due: 5/22/2027 Personal history of colon polyps   High-fiber diet to include, vegetables, whole-grain foods, daily Will call the patient in 1 to 2 weeks with results of the polyps  Bradford Tam DO FACG, FACP    EGD    Result Date: 5/22/2024  Narrative: Table formatting from the original result was not included.  [facility] [address] [facility] [address] [phone] DATE OF SERVICE: 5/22/24 PHYSICIAN(S): Attending: Bradford Tam DO Fellow: No Staff Documented INDICATION: Abnormal abdominal CT scan, Liver lesion POST-OP DIAGNOSIS: See the impression below. PREPROCEDURE: Informed consent was obtained for the procedure, including sedation.  Risks of perforation, hemorrhage, adverse drug reaction and aspiration were discussed. The patient was placed in the left lateral decubitus position. Patient was explained about the risks and benefits of the procedure. Risks including but not limited to bleeding, infection, and perforation were explained in detail. Also explained about less than 100% sensitivity with the exam and other alternatives. PROCEDURE: EGD DETAILS OF PROCEDURE: Patient was taken to the procedure room where a time out  was performed to confirm correct patient and correct procedure. The patient underwent monitored anesthesia care, which was administered by an anesthesia professional. The patient's blood pressure, heart rate, level of consciousness, respirations, oxygen, ECG and ETCO2 were monitored throughout the procedure. The scope was introduced through the mouth and advanced to the second part of the duodenum. Retroflexion was performed in the fundus. The patient's estimated blood loss was minimal (<5 mL). The procedure was not difficult. The patient tolerated the procedure well. There were no apparent adverse events. ANESTHESIA INFORMATION: ASA: ASA status not filed in the log. Anesthesia Type: Anesthesia type not filed in the log. MEDICATIONS: No administrations occurring from 1512 to 1528 on 05/22/24 FINDINGS: The cricopharynx, upper third of the esophagus and middle third of the esophagus appeared normal. Z-line is 38 cm from the incisors. Malignant-appearing, fungating and ulcerated mass (traversable) in the lower third of the esophagus (35 cm from the incisors), covering the whole circumference; bleeding occurred after intervention; performed cold forceps biopsy Malignant-appearing and fungating mass (traversable) in the cardia and fundus of the stomach, covering one third of the circumference; bleeding occurred before intervention; performed cold forceps biopsy The body of the stomach, incisura, antrum, prepyloric region and pylorus appeared normal. The duodenal bulb and 2nd part of the duodenum appeared normal. SPECIMENS: ID Type Source Tests Collected by Time Destination 1 : stomach mass Tissue Stomach TISSUE EXAM Bradford Padillaan,  5/22/2024  3:26 PM  2 : ESOPHAGEAL mass Tissue Esophagus TISSUE EXAM Bradford Padillaan,  5/22/2024  3:27 PM      Impression: The cricopharynx, upper third of the esophagus and middle third of the esophagus appeared normal. Malignant-appearing, fungating and ulcerated mass in the lower  third of the esophagus, covering the whole circumference; bleeding occurred after intervention; performed cold forceps biopsy Malignant-appearing and fungating mass in the cardia and fundus of the stomach, covering one third of the circumference; bleeding occurred before intervention; performed cold forceps biopsy The body of the stomach, incisura, antrum, prepyloric region and pylorus appeared normal. The duodenal bulb and 2nd part of the duodenum appeared normal. RECOMMENDATION: 1.  I have sent the Specimens to the pathology section with a stat request 2.  No indication for percutaneous biopsy of the liver   Bradford Tam, DO FACG, FACP     XR chest 1 view portable    Result Date: 5/18/2024  Narrative: XR CHEST PORTABLE INDICATION: hypotension. COMPARISON: Chest CT 5/18/2024, CXR 11/30/2023. FINDINGS: Emphysema with no acute disease. Surgical changes in the left lung. No pneumothorax or pleural effusion. Heart upper limit of normal in size with left subclavian ICD leads in right atrium and right ventricle. Bones are unremarkable for age. Old right clavicle fracture. Normal upper abdomen. Abdominal aortic stent graft.     Impression: No acute cardiopulmonary disease. Workstation performed: [id number]     CT chest abdomen pelvis w contrast    Result Date: 5/18/2024  Narrative: CT CHEST, ABDOMEN AND PELVIS WITH IV CONTRAST INDICATION: hypotension, hypothermic, concern for infx, diarrhea with blood today. 69-year-old male. COMPARISON: CT chest abdomen pelvis from November 30, 2023. TECHNIQUE: CT examination of the chest, abdomen and pelvis was performed. Multiplanar 2D reformatted images were created from the source data. This examination, like all CT scans performed in the [facility], was performed utilizing techniques to minimize radiation dose exposure, including the use of iterative reconstruction and automated exposure control. Radiation dose length product (DLP) for this visit: 724 mGy-cm IV  Contrast: 100 mL of iohexol (OMNIPAQUE) Enteric Contrast: Not administered. FINDINGS: CHEST LUNGS: Advanced emphysema, most severe in the upper lobes with large left apical bulla, unchanged since 11/30/2023. Postoperative changes in the left upper lobe. Resolution of bilateral lower lobe consolidation since the CT from 11/30/2023. Subsegmental atelectasis in the bases of both lower lobes. No mass, consolidation or suspicious nodule. PLEURA: Unremarkable. HEART/GREAT VESSELS: Coronary artery calcifications. Mild cardiomegaly. Heart otherwise unremarkable. ICD present.. Fusiform ectasia of the ascending thoracic aorta measuring up to 4.1 cm. Recommendation is for follow-up low radiation dose chest CT in one year. Mild dilatation of the central pulmonary arteries with main pulmonary artery diameter of 3.2 cm. MEDIASTINUM AND MAVIS: Several prominent mediastinal lymph nodes with normal morphology, the largest a subcarinal node with a short axis diameter of 1.4 cm these lymph nodes have decreased in size since 11/30/2030. No new lymphadenopathy or mass. Moderate  size hiatal hernia. Esophagus unremarkable. Trachea and main stem bronchi unremarkable. CHEST WALL AND LOWER NECK: Unremarkable. ABDOMEN LIVER/BILIARY TREE: Liver enlarged, 18.5 cm in length, increased in size since 11/30/2023. Interval development of numerous hypoenhancing masses throughout the liver, the largest including: 3.0 x 5.5 cm, segment 2; 3.0 x 4.3 cm; segments 2/4A; 2.7 x 4.1 cm mass, segment 5;; 2.5 x 3.2 cm, segment 6; 2.5 x 3.4 cm, segment 8. Bile ducts normal in caliber. GALLBLADDER: No calcified gallstones. No pericholecystic inflammatory change. SPLEEN: Unremarkable. PANCREAS: Several enlarged low-attenuation peripancreatic lymph nodes but no convincing evidence of intrapancreatic tumor questionable 0.7 x 1.1 cm hypoenhancing structure at the junction of the pancreatic head and uncinate process (series 2, image 145). ADRENAL GLANDS:  Unremarkable. KIDNEYS/URETERS: 1.4 x 1.6 cm fluid attenuation structure in the lateral mid left kidney, most likely a simple cortical cyst. No suspicious renal masses. Cortical scarring in the lower pole of the left kidney. No calculus or hydronephrosis. STOMACH AND BOWEL: Gastric fundus located within a hiatal hernia. Stomach otherwise unremarkable. Small intestine unremarkable. Diffuse colonic diverticulosis without evidence of diverticulitis. Colon otherwise unremarkable. APPENDIX: Normal. ABDOMINOPELVIC CAVITY: Multiple enlarged lymph nodes developing since 11/30/2023. These include a 1.5 x 3.7 cm portacaval node, several vinicius hepatis nodes, the largest measuring 1.3 x 1.8 cm and 1.1 x 1.6 cm, a 1.9 x 2.2 cm node superior to the proximal  pancreatic body, 1.3 x 2.0 cm left para-aortic node at the level of the origin of the SMA, an additional 1.0 x 1.5 cm node adjacent to the origin of the SMA. Trace amount of pelvic ascites. No extraluminal gas. VESSELS: Extensive aortic and iliac atherosclerosis. Aortoiliac stent grafts present. Stented left common iliac artery appears to be occluded. Femorofemoral bypass graft present, but also unenhanced and likely occluded. Splenic, portal and superior mesenteric veins patent. Splenic artery and SMA patent. PELVIS REPRODUCTIVE ORGANS: Enlarged prostate. URINARY BLADDER: Unremarkable. ABDOMINAL WALL/INGUINAL REGIONS: Unremarkable. BONES: Deformity of the right clavicle, consistent with old healed, healed fracture. No acute fracture or destructive lesion.     Impression: 1.  Multiple hepatic metastases, developing since 11/30/2023. 2.  Retroperitoneal, peripancreatic and vinicius hepatis lymphadenopathy, also new since 11/30/2023. 3.  Site of primary malignancy unknown although there is subtle suggestion of a 0.7 x 1.1 cm mass at the junction of the pancreatic head and uncinate process. This can be better evaluated with MRI of the abdomen without and with IV contrast, with MRCP.  4.  Aortobifemoral stent graft present with probable complete occlusion of the left iliac limb and probable occlusion of the femoral-femoral bypass graft. 5.  Severe emphysema. 6.  Other than subsegmental atelectasis in the bases of the both lower lobes, no evidence of acute abnormality in the chest. I personally discussed this study with MAUREEN AMARO on 5/18/2024 12:27 PM. Workstation performed: [id number]     CT head without contrast    Result Date: 5/18/2024  Narrative: CT BRAIN - WITHOUT CONTRAST INDICATION:   worsening mental status (h/o dementia). 69-year-old male. COMPARISON: CT from November 30, 2023. TECHNIQUE:  CT examination of the brain was performed.  Multiplanar 2D reformatted images were created from the source data. Radiation dose length product (DLP) for this visit:  882 mGy-cm .  This examination, like all CT scans performed in the [facility], was performed utilizing techniques to minimize radiation dose exposure, including the use of iterative reconstruction and automated exposure control. IMAGE QUALITY:  Diagnostic. FINDINGS: PARENCHYMA:  No intracranial mass, mass effect or midline shift. No CT signs of acute infarction.  No acute parenchymal hemorrhage. Decreased white matter attenuation, most consistent with chronic microangiopathic changes. VENTRICLES AND EXTRA-AXIAL SPACES: Ventricles and extra-axial CSF spaces are prominent commensurate with the degree of volume loss.  No hydrocephalus.  No acute extra-axial hemorrhage. VISUALIZED ORBITS: Normal visualized orbits. PARANASAL SINUSES: Normal visualized paranasal sinuses. CALVARIUM AND EXTRACRANIAL SOFT TISSUES:  Normal.     Impression: No acute intracranial abnormality. Workstation performed: [id number]             Labs:   Lab Results   Component Value Date    WBC 6.38 05/24/2024    HGB 11.6 (L) 05/24/2024    HCT 36.6 05/24/2024    MCV 82 05/24/2024     (L) 05/24/2024     Lab Results   Component Value Date  "   K 3.4 (L) 05/24/2024     05/24/2024    CO2 23 05/24/2024    BUN 11 05/24/2024    CREATININE 0.69 05/24/2024    CALCIUM 7.5 (L) 05/24/2024    CORRECTEDCA 8.7 05/23/2024    AST 91 (H) 05/23/2024    ALT 20 05/23/2024    ALKPHOS 279 (H) 05/23/2024    EGFR 96 05/24/2024         No results found for: \"SPEP\", \"UPEP\"    No results found for: \"PSA\"    Lab Results   Component Value Date    CEA 1.2 05/21/2024       No results found for: \"\"    No results found for: \"AFP\"    No results found for: \"IRON\", \"TIBC\", \"FERRITIN\"    No results found for: \"[id number]\"      ROS: Review of Systems   Constitutional: Negative.    HENT: Negative.     Eyes: Negative.    Respiratory: Negative.     Cardiovascular: Negative.    Gastrointestinal: Negative.    Endocrine: Negative.    Genitourinary: Negative.    Musculoskeletal: Negative.    Skin: Negative.    Allergic/Immunologic: Negative.    Neurological: Negative.    Hematological: Negative.    Psychiatric/Behavioral:  Positive for confusion.      He does not live alone he has help at home he uses a walker to assist him with ambulation he does get easily confused has known dementia.    Current Medications: Reviewed  Allergies: Reviewed  PMH/FH/SH:  Reviewed      Physical Exam:    Body surface area is 2.08 meters squared.    Wt Readings from Last 3 Encounters:   05/29/24 85.3 kg (188 lb)   05/23/24 84.5 kg (186 lb 4.6 oz)   12/04/23 83.5 kg (184 lb)        Temp Readings from Last 3 Encounters:   05/29/24 (!) 97.3 °F (36.3 °C) (Temporal)   05/25/24 98.4 °F (36.9 °C)   12/04/23 (!) 97.4 °F (36.3 °C) (Oral)        BP Readings from Last 3 Encounters:   05/29/24 122/62   05/25/24 138/89   12/04/23 113/60         Pulse Readings from Last 3 Encounters:   05/29/24 81   05/25/24 94   12/04/23 60     @LASTSAO2(3)@      Physical Exam  Constitutional:       Appearance: Normal appearance. He is normal weight.   HENT:      Head: Normocephalic and atraumatic.   Eyes:      Extraocular Movements: " Extraocular movements intact.      Conjunctiva/sclera: Conjunctivae normal.      Pupils: Pupils are equal, round, and reactive to light.   Cardiovascular:      Rate and Rhythm: Normal rate and regular rhythm.      Heart sounds: Normal heart sounds.   Pulmonary:      Effort: Pulmonary effort is normal.      Breath sounds: Normal breath sounds.   Abdominal:      General: Abdomen is flat. Bowel sounds are normal.      Palpations: Abdomen is soft.   Musculoskeletal:         General: Normal range of motion.      Cervical back: Normal range of motion and neck supple.   Skin:     General: Skin is warm and dry.   Neurological:      General: No focal deficit present.      Mental Status: He is alert and oriented to person, place, and time. Mental status is at baseline.     He can move his arms and legs.  Uses walker for gait.  Element of dementia and slight confusion.      Goals and Barriers:  Current Goal: Prolong Survival from Cancer.   Barriers: None.      Patient's Capacity to Self Care:  Patient is able to self care.    Code Status: [unfilled]  Advance Directive and Living Will:      Power of :

## 2024-05-30 ENCOUNTER — PATIENT OUTREACH (OUTPATIENT)
Dept: HEMATOLOGY ONCOLOGY | Facility: CLINIC | Age: 69
End: 2024-05-30

## 2024-05-30 ENCOUNTER — TELEPHONE (OUTPATIENT)
Dept: HEMATOLOGY ONCOLOGY | Facility: CLINIC | Age: 69
End: 2024-05-30

## 2024-05-30 DIAGNOSIS — C16.0 MALIGNANT NEOPLASM OF CARDIA OF STOMACH (HCC): Primary | ICD-10-CM

## 2024-05-30 DIAGNOSIS — C16.0 MALIGNANT NEOPLASM OF CARDIA OF STOMACH (HCC): ICD-10-CM

## 2024-05-30 DIAGNOSIS — C22.8 LIVER CANCER, PRIMARY, WITH METASTASIS FROM LIVER TO OTHER SITE (HCC): ICD-10-CM

## 2024-05-30 LAB
DME PARACHUTE DELIVERY DATE ACTUAL: NORMAL
DME PARACHUTE DELIVERY DATE REQUESTED: NORMAL
DME PARACHUTE ITEM DESCRIPTION: NORMAL
DME PARACHUTE ORDER STATUS: NORMAL
DME PARACHUTE SUPPLIER NAME: NORMAL
DME PARACHUTE SUPPLIER PHONE: NORMAL

## 2024-05-30 PROCEDURE — 88360 TUMOR IMMUNOHISTOCHEM/MANUAL: CPT | Performed by: PATHOLOGY

## 2024-05-30 RX ORDER — ONDANSETRON HYDROCHLORIDE 8 MG/1
8 TABLET, FILM COATED ORAL EVERY 8 HOURS PRN
Qty: 30 TABLET | Refills: 1 | Status: SHIPPED | OUTPATIENT
Start: 2024-05-30 | End: 2024-06-01

## 2024-05-30 RX ORDER — LIDOCAINE AND PRILOCAINE 25; 25 MG/G; MG/G
CREAM TOPICAL AS NEEDED
Qty: 30 G | Refills: 1 | Status: SHIPPED | OUTPATIENT
Start: 2024-05-30

## 2024-05-30 RX ORDER — ONDANSETRON HYDROCHLORIDE 8 MG/1
8 TABLET, FILM COATED ORAL EVERY 8 HOURS PRN
Qty: 30 TABLET | Refills: 1 | Status: SHIPPED | OUTPATIENT
Start: 2024-05-30 | End: 2024-05-30 | Stop reason: SDUPTHER

## 2024-05-30 RX ORDER — LIDOCAINE AND PRILOCAINE 25; 25 MG/G; MG/G
CREAM TOPICAL AS NEEDED
Qty: 30 G | Refills: 1 | Status: SHIPPED | OUTPATIENT
Start: 2024-05-30 | End: 2024-05-30 | Stop reason: SDUPTHER

## 2024-05-30 NOTE — TELEPHONE ENCOUNTER
Please schedule pt for this plan. Port is being placed on 6/14. Provider will be seeing pt on 6/18 at 9am if you have availability to do treatment that same day if not whenever you have the time. TY

## 2024-05-30 NOTE — PROGRESS NOTES
Unable to order caris testing today because must be 14 days post discharge to be covered by insurance. Scheduled ordering alert.

## 2024-05-30 NOTE — TELEPHONE ENCOUNTER
Pt is scheduled, call to go over his appts and pts sister answered who takes care of him she said that she was shopping and will call back once she gets back home.

## 2024-05-30 NOTE — PROGRESS NOTES
Patient and family educated on  Folfox ( Oxaliplatin, Leucovorin, Fluorouracil) and Nivolumab treatment. Education sheets printed from [facility]. Educated on port placement and discussed they would call him. Informed family will call in Emla cream and told to apply 45-60min prior to his appt. Also discussed Zofran was going to be called in. Provided sheet with Elastomeric ball and explained appts.   Patient was paying attention at times, seemed uncomfortable but other two family members were engaged.   Chemotherapy and you book provided and sheet where shows when they should be contacting us. Consent is obtained. Questions answered and family understands treatment.

## 2024-05-30 NOTE — PROGRESS NOTES
Phone outreach to Nayla to follow up with her.  She reports is out at the store and asked me to return call at 1400.  I will call her back then. I thanked her.

## 2024-05-30 NOTE — PROGRESS NOTES
Spoke with Nayla, she reports they will have a family meeting and discuss weather or not to pursue treatment.  She will call me tomorrow to inform me what their decision is. I told her I will be available for her all day.  I offered to place for palliative care referral and she declined for now.  I thanked her for her time.

## 2024-05-31 ENCOUNTER — HOME CARE VISIT (OUTPATIENT)
Dept: HOME HEALTH SERVICES | Facility: HOME HEALTHCARE | Age: 69
End: 2024-05-31
Payer: MEDICARE

## 2024-05-31 ENCOUNTER — TELEPHONE (OUTPATIENT)
Dept: INFUSION CENTER | Facility: CLINIC | Age: 69
End: 2024-05-31

## 2024-05-31 ENCOUNTER — PATIENT OUTREACH (OUTPATIENT)
Dept: HEMATOLOGY ONCOLOGY | Facility: CLINIC | Age: 69
End: 2024-05-31

## 2024-05-31 NOTE — TELEPHONE ENCOUNTER
Call placed to sister Nayla to discuss patient's decision. Nayla states that patient does not want to move forward with treatment. They had a family discussion and they are going to honor his wishes. She has been in communication with nurse navigator and other providers and they feel this is the best option. They are requesting home hospice for him, Dr. Fuentes will be notified to start the process. I made them aware if any other questions or concerns they can always reach out to our office.

## 2024-05-31 NOTE — Clinical Note
Tom Pisano 68 yo male 1955, stage iv gastric cancer mets to liver and esophagus. pt also has dementia, heart disease. pps 40 alb 2.6 rloc?

## 2024-05-31 NOTE — PROGRESS NOTES
I received a phone call from Nayla, patient's sister and POA.  She reports Tom and family had a meeting last [facility] to discuss care options and he decided not to pursue treatment and would like to cancel orders and request home hospice.

## 2024-05-31 NOTE — TELEPHONE ENCOUNTER
Attempted to call pt to schedule infusion appts. Pts sister  stated he was advised by other drs and  didn't want to go forward with tx, Please reach out to pt to discuss further

## 2024-06-01 ENCOUNTER — HOME CARE VISIT (OUTPATIENT)
Dept: HOME HEALTH SERVICES | Facility: HOME HEALTHCARE | Age: 69
End: 2024-06-01
Payer: MEDICARE

## 2024-06-01 VITALS
RESPIRATION RATE: 16 BRPM | TEMPERATURE: 97.8 F | HEART RATE: 70 BPM | SYSTOLIC BLOOD PRESSURE: 110 MMHG | OXYGEN SATURATION: 96 % | DIASTOLIC BLOOD PRESSURE: 80 MMHG

## 2024-06-01 PROCEDURE — G0299 HHS/HOSPICE OF RN EA 15 MIN: HCPCS

## 2024-06-04 ENCOUNTER — HOME CARE VISIT (OUTPATIENT)
Dept: HOME HOSPICE | Facility: HOSPICE | Age: 69
End: 2024-06-04
Payer: MEDICARE

## 2024-06-04 PROCEDURE — G0155 HHCP-SVS OF CSW,EA 15 MIN: HCPCS

## 2024-06-05 ENCOUNTER — HOME CARE VISIT (OUTPATIENT)
Dept: HOME HOSPICE | Facility: HOSPICE | Age: 69
End: 2024-06-05
Payer: MEDICARE

## 2024-06-06 ENCOUNTER — HOME CARE VISIT (OUTPATIENT)
Dept: HOME HEALTH SERVICES | Facility: HOME HEALTHCARE | Age: 69
End: 2024-06-06
Payer: MEDICARE

## 2024-06-06 PROCEDURE — G0299 HHS/HOSPICE OF RN EA 15 MIN: HCPCS

## 2024-06-07 ENCOUNTER — HOME CARE VISIT (OUTPATIENT)
Dept: HOME HOSPICE | Facility: HOSPICE | Age: 69
End: 2024-06-07
Payer: MEDICARE

## 2024-06-07 VITALS — HEART RATE: 80 BPM | SYSTOLIC BLOOD PRESSURE: 122 MMHG | DIASTOLIC BLOOD PRESSURE: 68 MMHG | RESPIRATION RATE: 16 BRPM

## 2024-06-08 ENCOUNTER — HOME CARE VISIT (OUTPATIENT)
Dept: HOME HOSPICE | Facility: HOSPICE | Age: 69
End: 2024-06-08
Payer: MEDICARE

## 2024-06-12 NOTE — TELEPHONE ENCOUNTER
"Received call from Deepthi from [facility] asking for the number for [facility] where his ICD is being being followed. Informed her pt does not follow with [facility] and the only notes I see from Cardiology is from \"[facility]\" from 2021. Provided her with the number to that office for further assistance.   "

## 2024-06-13 ENCOUNTER — HOME CARE VISIT (OUTPATIENT)
Dept: HOME HOSPICE | Facility: HOSPICE | Age: 69
End: 2024-06-13
Payer: MEDICARE

## 2024-06-21 LAB
CARIS EBER ISH: NEGATIVE
CARIS GENOMIC LOH - EXOME: NORMAL
CARIS HER2/NEU: NEGATIVE
CARIS HLA-A: NORMAL
CARIS HLA-B: NORMAL
CARIS HLA-C: NORMAL
CARIS MISMATCH REPAIR STATUS: NORMAL
CARIS MLH1: NORMAL
CARIS MSH2: NORMAL
CARIS MSH6: NORMAL
CARIS MSI - EXOME: NORMAL
CARIS PD-L1 (22C3): POSITIVE
CARIS PMS2: NORMAL
CARIS TMB - EXOME: NORMAL

## 2024-06-26 ENCOUNTER — HOME CARE VISIT (OUTPATIENT)
Dept: HOME HOSPICE | Facility: HOSPICE | Age: 69
End: 2024-06-26
Payer: MEDICARE
